# Patient Record
Sex: MALE | Race: WHITE | Employment: OTHER | URBAN - METROPOLITAN AREA
[De-identification: names, ages, dates, MRNs, and addresses within clinical notes are randomized per-mention and may not be internally consistent; named-entity substitution may affect disease eponyms.]

---

## 2018-12-19 PROBLEM — G62.9 AXONAL POLYNEUROPATHY: Status: ACTIVE | Noted: 2018-12-19

## 2018-12-19 PROBLEM — R20.2 PARESTHESIA OF RIGHT FOOT: Status: ACTIVE | Noted: 2018-12-19

## 2018-12-19 PROBLEM — M79.604 RIGHT LEG PAIN: Status: ACTIVE | Noted: 2018-12-19

## 2019-01-03 ENCOUNTER — HOSPITAL ENCOUNTER (OUTPATIENT)
Dept: MRI IMAGING | Age: 74
Discharge: HOME | End: 2019-01-03
Attending: PSYCHIATRY & NEUROLOGY
Payer: MEDICARE

## 2019-01-03 DIAGNOSIS — M79.604 RIGHT LEG PAIN: ICD-10-CM

## 2019-01-03 DIAGNOSIS — R20.2 PARESTHESIA OF RIGHT FOOT: ICD-10-CM

## 2019-01-03 PROCEDURE — 72148 MRI LUMBAR SPINE W/O DYE: CPT

## 2019-06-20 PROBLEM — M54.16 LUMBAR RADICULAR PAIN: Status: ACTIVE | Noted: 2019-06-20

## 2019-06-20 PROBLEM — E53.8 B12 DEFICIENCY: Status: ACTIVE | Noted: 2019-06-20

## 2019-06-20 PROBLEM — M25.551 PAIN OF RIGHT HIP JOINT: Status: ACTIVE | Noted: 2019-06-20

## 2019-06-27 ENCOUNTER — HOSPITAL ENCOUNTER (OUTPATIENT)
Dept: PHYSICAL THERAPY | Age: 74
Discharge: HOME | End: 2019-06-27
Attending: PSYCHIATRY & NEUROLOGY
Payer: MEDICARE

## 2019-06-27 DIAGNOSIS — M54.16 LUMBAR RADICULAR PAIN: ICD-10-CM

## 2019-06-27 DIAGNOSIS — M25.551 PAIN OF RIGHT HIP JOINT: ICD-10-CM

## 2019-06-27 DIAGNOSIS — M79.604 RIGHT LEG PAIN: ICD-10-CM

## 2019-06-27 PROCEDURE — 97162 PT EVAL MOD COMPLEX 30 MIN: CPT

## 2019-06-27 PROCEDURE — 97110 THERAPEUTIC EXERCISES: CPT

## 2019-06-27 NOTE — PROGRESS NOTES
Julius Hasmukh  : 1945  [place]  Secondary: Jamin Linares at [facility]  [address] [facility], [address] [place] [address],[address], [address] [facility]  [phone]   [phone]         OUTPATIENT PHYSICAL THERAPY: Daily Treatment Note 2019  Visit Count:  1    CD-10: Treatment Diagnosis:  RIGHT knee pain M25.561, Right Hip Pain M25.551, low back pain M54.5  Precautions/Allergies:   Patient has no known allergies. TREATMENT PLAN:  Effective Dates: 2019 TO 2019 (60 days). Frequency/Duration: 2 times a week for 60 Day(s)    Pre-treatment Symptoms/Complaints:  Low back pain, right hip and knee pain  Pain: Initial: Pain Intensity 1: (0/10 pain, 7/10 neurological symptoms)  Post Session:  5/10   Medications Last Reviewed:  2019  Updated Objective Findings:  See evaluation note from today  TREATMENT:     THERAPEUTIC EXERCISE: (13 minutes):  Exercises per grid below to improve mobility, strength and balance. Required moderate visual, verbal, manual and tactile cues to promote proper body alignment, promote proper body posture and promote proper body mechanics. Progressed resistance, range, repetitions and complexity of movement as indicated. Date:  19 Date:   Date:     Activity/Exercise Parameters Parameters Parameters   Sciatic nerve glide 20 reps     Standing ext 20 reps     Hip ER stertch 3x30 sec hold                                 [employer] Portal  Treatment/Session Summary:    · Response to Treatment:  Pt would benefit from skilled therapy to address the aforementioned deficits that limit functional ability to return to previous level of function. .  · Communication/Consultation:  eval sent to referring MD  · Equipment provided today:  HEP  · Recommendations/Intent for next treatment session: Next visit will focus on progressing strengthening.     Total Treatment Billable Duration:  13 minutes  PT Patient Time In/Time Out  Time In: 1430  Time Out: GRISELDA Weiner    Future Appointments   Date Time Provider Cheyenne Martell   7/2/2019  2:30 PM Trenton Robledo, PT [facility]   7/9/2019  2:30 PM Marjosue Escalera, PTA [facility]   7/11/2019  8:30 AM [facility]   7/11/2019  2:30 PM Leobardo Escalera, PTA [facility]   7/16/2019  2:30 PM Zach Herring, PT [facility]   7/18/2019 10:15 AM Denisha Avilez MD [facility]   7/23/2019  2:30 PM Leobardo Escalera, PTA [facility]   7/25/2019  2:30 PM Zach Herring, PT [facility]   7/30/2019  2:30 PM Holly Hickman, PTA [facility]   2/18/2020 11:00 AM Roberto Carlos Andersen MD [facility]

## 2019-06-27 NOTE — THERAPY EVALUATION
Julius Noe  : 1945  [place]  Secondary: Jamin Linares at [place] [id number] [facility], [address] [place] [address],[address], [place] [address].  [phone]   [phone]           OUTPATIENT PHYSICAL THERAPY:Initial Assessment 2019   ICD-10: Treatment Diagnosis:  RIGHT knee pain M25.561, Right Hip Pain M25.551, low back pian M54.5  Precautions/Allergies:   Patient has no known allergies. TREATMENT PLAN:  Effective Dates: 2019 TO 2019 (60 days). Frequency/Duration: 2 times a week for 60 Day(s) MEDICAL/REFERRING DIAGNOSIS:  Right leg pain [M79.604]  Lumbar radicular pain [M54.16]  Pain of right hip joint [M25.551]   DATE OF ONSET: one year history  REFERRING PHYSICIAN: Nahomi Garcia MD MD Orders: eval and treat  Return MD Appointment: Neurologist 2020, PCP 2019     INITIAL ASSESSMENT:  Mr. Virl Goltz presents with positive radicular symptoms, clonus on R LE, decreased R LE strength, tenderness in R lumbar paraspinals, glutes and piriformis, R LE nerve tension, poor core strength, decreased joint mobility L2-4 with hypermobile L5-S1 that limits ability to prolonged stand and walk. Pt would benefit from skilled therapy to address these deficits for return to previous level of function. PROBLEM LIST (Impacting functional limitations):  1. Decreased Strength  2. Decreased ADL/Functional Activities  3. Increased Pain  4. Decreased Flexibility/Joint Mobility  5. Decreased [place] with Home Exercise Program INTERVENTIONS PLANNED: (Treatment may consist of any combination of the following)  1. Cold  2. Heat  3. Home Exercise Program (HEP)  4. Manual Therapy  5. Range of Motion (ROM)  6. Therapeutic Exercise/Strengthening     GOALS: (Goals have been discussed and agreed upon with patient.)  Short-Term Functional Goals: Time Frame: 4 weeks  1.  Pt will be I with HEP to allow for continued progress with symptom management. 2. Pt will improve c/o LE numbness to 5/10 for neurological signs indicating centralizing symptoms. Discharge Goals: Time Frame: 8 weeks   1. Pt will improve c/o pain to 3/10 to allow for improved tolerance for return to previous level of function. 2. Pt will improve ROSE score to <15 to reflect an improvement in function. 3. Pt will reports improved tolerance for ambulating to 30 minutes. 4. Pt will improve TA activation to 1 minute hold. OUTCOME MEASURE:     Tool Used: Modified Oswestry Low Back Pain Questionnaire  Score:  Initial: 25/50  Most Recent: X/50 (Date: -- )   Interpretation of Score: Each section is scored on a 0-5 scale, 5 representing the greatest disability. The scores of each section are added together for a total score of 50. MEDICAL NECESSITY:   · Patient is expected to demonstrate progress in strength and range of motion to return to previous level of function. REASON FOR SERVICES/OTHER COMMENTS:  · Patient continues to require present interventions due to patient's inability to prolonged ambulate. Total Duration:  PT Patient Time In/Time Out  Time In: 1430  Time Out: 1515    Rehabilitation Potential For Stated Goals: Good  Regarding Amol Hurtado's therapy, I certify that the treatment plan above will be carried out by a therapist or under their direction. Thank you for this referral,  Ernst Hartley, PT     Referring Physician Signature: Courtney Jaime MD _______________________________ Date _____________     PAIN/SUBJECTIVE:   Initial: Pain Intensity 1: (0/10 pain, 7/10 neurological symptoms)  Post Session:  5/10   HISTORY:   History of Injury/Illness (Reason for Referral):  Pt is a 76 y.o. Male presenting to PT with low back pain and right hip and knee pain for one year history. Has a history of neuropathy in his feet. When he is working in the yard his left leg goes numb after a while.   The numbness and pain goes down the outside of his right leg. The muscles get tight and painful in his leg. Rest for 30 minutes with feet propped up helps to relieve the symptoms. Exhaustion increases symptoms. Has a sensation of heaviness. Feels that when the symptoms increase he is more apt to trip. Sitting with his right leg crossed over his left leg increases his R LE symptoms. Is limited to sitting for 1 hour before he has altered sensation in his R LE. Takes gabapentin once per day in the evening  But has not noticed any improvement with the medication. States that it affects his balance but does not help his right LE symptoms. Some c/o low back pain that coincides with his LE symptoms. LBP occurs primarily with bending forward. Had a cyst removed 15 years ago that resolved all his LBP issues. An MRI was performed. Results below:  L5-S1: There is a circumferential disc bulge with a central posterior disc  protrusion without fely nerve root impingement. No central stenosis noted. Bilateral degenerative facet arthropathy is present. There is mild right  foramina narrowing. The left neural foramen is patent.     Limited evaluation of the paraspinal soft tissues is unremarkable.     IMPRESSION  IMPRESSION:  1. Multilevel degenerative disc and facet disease resulting in moderate  bilateral foramina narrowing at L4-L5 and mild right foramina narrowing at  L5-S1. Mild left foramina narrowing is noted at L3-L4. 2. No central spinal stenosis appreciated. Past Medical History/Comorbidities:   Mr. 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 Mr. Obi Portillo  has no past surgical history on file. Social History/Living Environment:     lives with wife  Prior Level of Function/Work/Activity:  Enjoys walking  none   Ambulatory/Rehab Services H2 Model Falls Risk Assessment   Risk Factors:       No Risk Factors Identified       (1)  Gender [Male] Ability to Rise from Chair:       (0)  Ability to rise in a single movement   Falls Prevention Plan:       No modifications necessary   Total: (5 or greater = High Risk): 1   ©2010 [facility] [employer]. All Rights Reserved. [facility] [employer] Patent #[id number]. Federal Law prohibits the replication, distribution or use without written permission from [facility] [employer]   Current Medications:       Current Outpatient Medications:     multivitamin (ONE A DAY) tablet, Take 1 Tab by mouth daily. , Disp: , Rfl:     cyanocobalamin 1,000 mcg tablet, Take 1,000 mcg by mouth daily. , Disp: , Rfl:     gabapentin (NEURONTIN) 300 mg capsule, 1 capsule (3) three times a day, additional 1 qhs prn  Indications: Neuropathic Pain, Disp: 360 Cap, Rfl: 3    potassium chloride (K-DUR, KLOR-CON) 20 mEq tablet, Take  by mouth two (2) times a day., Disp: , Rfl:     amLODIPine (NORVASC) 5 mg tablet, Take 1 Tab by mouth daily. , Disp: 90 Tab, Rfl: 3    levothyroxine (SYNTHROID) 50 mcg tablet, Take 1 Tab by mouth Daily (before breakfast). , Disp: 90 Tab, Rfl: 3    lisinopril-hydroCHLOROthiazide (ZESTORETIC) 20-12.5 mg per tablet, Take 1 Tab by mouth daily. , Disp: 90 Tab, Rfl: 3    magnesium oxide (MAG-OX) 400 mg tablet, 1 daily for muscle cramping, may have laxative effect, Disp: 90 Tab, Rfl: 2    pravastatin (PRAVACHOL) 40 mg tablet, TAKE ONE TABLET BY MOUTH AT BEDTIME, Disp: 90 Tab, Rfl: 3   Date Last Reviewed:  6/27/2019    Number of Personal Factors/Comorbidities that affect the Plan of Care: 0: LOW COMPLEXITY   EXAMINATION:   Observation/Orthostatic Postural Assessment:          Flattened lumbar spine  Palpation:          Hypertonic and hypersensitive R lumbar paraspinal  Joint mobility deficits at L4-5 with tenderness noted  ROM:             LUMBAR SPINE    AROM    Flexion   Extension  Right Rotation  Left Rotation  Right Sidebend  Left Sidebend 75 %  70 %  75 % pain on R side  75 %  75 % pain on L side  75 % pain on R side     Hip ER:  L-  45, R- 35 degrees  Hip IR:  L- 25, R - 25 degrees    Strength:    TA-  4/5 with poor activation          LOWER EXTREMITY      Left  Right    Knee flexion  Knee extension  Hip Ext  Hip Flexion  Hip ER  Hip IR   Hip ABD-  5-/5  5-/5  5-/5  5-/5  5-/5  5-/5  5-/5 Knee flexion   Knee extension  Hip Ext  Hip Flexion  Hip ER  Hip IR  Hip ABD   4/5  4/5  4/5  4/5  3+/5  4-/5  4-/5        Special Tests:          Slump-  Positive for R LE pain when sensitized  Traction- improved c/o numbness  Clonus- positive on R LE with 5 pumps  Functional Mobility:         Gait/Ambulation:  WNL  SLS-  R LE 2 seconds with c/o knee buckling, L LE 5 seconds with improved ankle strategy   Body Structures Involved:  6. Nerves  7. Muscles Body Functions Affected:  7. Sensory/Pain  8. Neuromusculoskeletal  9. Movement Related Activities and Participation Affected:  3.  General Tasks and Demands   Number of elements (examined above) that affect the Plan of Care: 3: MODERATE COMPLEXITY   CLINICAL PRESENTATION:   Presentation: Evolving clinical presentation with changing clinical characteristics: MODERATE COMPLEXITY   CLINICAL DECISION MAKING:   Use of outcome tool(s) and clinical judgement create a POC that gives a: Questionable prediction of patient's progress: MODERATE COMPLEXITY

## 2019-07-02 ENCOUNTER — HOSPITAL ENCOUNTER (OUTPATIENT)
Dept: PHYSICAL THERAPY | Age: 74
Discharge: HOME | End: 2019-07-02
Attending: PSYCHIATRY & NEUROLOGY
Payer: MEDICARE

## 2019-07-02 PROCEDURE — 97140 MANUAL THERAPY 1/> REGIONS: CPT

## 2019-07-02 PROCEDURE — 97110 THERAPEUTIC EXERCISES: CPT

## 2019-07-02 NOTE — PROGRESS NOTES
Oumou Hamm  : 1945  [place]  Secondary: Jamin Linares at [facility], [address] [place] [address],[address], [address] [facility]  [phone]   [phone]         OUTPATIENT PHYSICAL THERAPY: Daily Treatment Note 2019  Visit Count:  2    CD-10: Treatment Diagnosis:  RIGHT knee pain M25.561, Right Hip Pain M25.551, low back pain M54.5  Precautions/Allergies:   Patient has no known allergies. TREATMENT PLAN:  Effective Dates: 2019 TO 2019 (60 days). Frequency/Duration: 2 times a week for 60 Day(s)    Pre-treatment Symptoms/Complaints:  Low back pain, right hip and knee pain- 2019 Pt reports that he has trouble with one of his exercises but the rest are fine. Pain: Initial: Pain Intensity 1: 7  Post Session:  5/10   Medications Last Reviewed:  2019  Updated Objective Findings:  None Today  TREATMENT:     THERAPEUTIC EXERCISE: (30 minutes):  Exercises per grid below to improve mobility, strength and balance. Required moderate visual, verbal, manual and tactile cues to promote proper body alignment, promote proper body posture and promote proper body mechanics. Progressed resistance, range, repetitions and complexity of movement as indicated. Date:  19 Date:  19 Date:     Activity/Exercise Parameters Parameters Parameters   Sciatic nerve glide 20 reps 20 reps each    Standing ext 20 reps     Hip ER stertch 3x30 sec hold 3x30 sec hold    Prone press up  15 reps    bridge  20 reps    clamshell  Blue x 20 reps each    Pull down with TA  Blue x 20 reps    melba pose  3x30 sec hold    nustep  6 min level 4.0                        MANUAL THERAPY (13 minutes)  Lumbar paraspinal and QL release, CPA and UPA L2-4 grade III-IV To promote tissue length and joint mobility for return to previous level of function.    [employer] Portal  Treatment/Session Summary:    · Response to Treatment:  Pt with good early response to therapy and carryover with HEP. Updated hip ER stretch to decrease c/o pain. Significant soft tissue restriction and tenderness in R lumbar paraspinals so added release to HEP. · Communication/Consultation:  None today  · Equipment provided today:  HEP  · Recommendations/Intent for next treatment session: Next visit will focus on progressing strengthening.     Total Treatment Billable Duration:  43 minutes  PT Patient Time In/Time Out  Time In: 5701  Time Out: 5[facility], PT    Future Appointments   Date Time Provider Cheyenne Martell   7/9/2019  2:30 PM Jordon Arreola PTA [facility] SFE   7/11/2019  8:30 AM 10 [facility] 10 [facility] 10 [facility]   7/11/2019  2:30 PM Jordon Arreola PTA [facility]   7/16/2019  2:30 PM Ashish Herring, PT [facility]   7/18/2019 10:15 AM Deborah Maya MD [facility] 10 [address]   7/23/2019  2:30 PM Jordon Arreola PTA [facility]   7/25/2019  2:30 PM Ashish Herring, PT [facility]   7/30/2019  2:30 PM Terence Hickman, PTA [facility]   2/18/2020 11:00 AM Evan Lozada MD [facility]

## 2019-07-09 ENCOUNTER — APPOINTMENT (OUTPATIENT)
Dept: PHYSICAL THERAPY | Age: 74
End: 2019-07-09
Attending: PSYCHIATRY & NEUROLOGY
Payer: MEDICARE

## 2019-07-10 ENCOUNTER — HOSPITAL ENCOUNTER (OUTPATIENT)
Dept: PHYSICAL THERAPY | Age: 74
Discharge: HOME | End: 2019-07-10
Attending: PSYCHIATRY & NEUROLOGY
Payer: MEDICARE

## 2019-07-10 PROCEDURE — 97140 MANUAL THERAPY 1/> REGIONS: CPT

## 2019-07-10 PROCEDURE — 97110 THERAPEUTIC EXERCISES: CPT

## 2019-07-10 NOTE — PROGRESS NOTES
Lazarus Ovens  : 1945  [place]  Secondary: Jamin Linares at [facility] 52, [address] [facility],[address], [facility] [address].  [phone]   [phone]         OUTPATIENT PHYSICAL THERAPY: Daily Treatment Note 7/10/2019  Visit Count:  3    CD-10: Treatment Diagnosis:  RIGHT knee pain M25.561, Right Hip Pain M25.551, low back pain M54.5  Precautions/Allergies:   Patient has no known allergies. TREATMENT PLAN:  Effective Dates: 2019 TO 2019 (60 days). Frequency/Duration: 2 times a week for 60 Day(s)    Pre-treatment Symptoms/Complaints:  Low back pain, right hip and knee pain- 7/10/2019  It is about the same. Pain: Initial:    Post Session:  5/10   Medications Last Reviewed:  7/10/2019  Updated Objective Findings:  None Today  TREATMENT:     THERAPEUTIC EXERCISE: (30 minutes):  Exercises per grid below to improve mobility, strength and balance. Required moderate visual, verbal, manual and tactile cues to promote proper body alignment, promote proper body posture and promote proper body mechanics. Progressed resistance, range, repetitions and complexity of movement as indicated. Date:  07/10/19 Date:     Activity/Exercise  Parameters Parameters   Sciatic nerve glide  20 reps each          Hip ER stertch  3x30 sec hold    Prone press up  15 reps    bridge  20 reps    clamshell  Blue x 20 reps each    Pull down with TA  Blue x 20 reps    melba pose  3x30 sec hold    nustep  6 min level 4.0    Standing Extension  High March abduction  X 15 reps each                  MANUAL THERAPY (13 minutes)  Lumbar paraspinal and QL release, CPA and UPA L2-4 grade III-IV To promote tissue length and joint mobility for return to previous level of function. [employer] Portal  Treatment/Session Summary:    · Response to Treatment:   Patient was able to complete tx. Point tenderness in Right Gluteal region.   Patient continue's to have symptoms down leg and into foot. · Communication/Consultation:  None today  · Equipment provided today:  HEP  · Recommendations/Intent for next treatment session: Next visit will focus on progressing strengthening.     Total Treatment Billable Duration:  43 minutes  PT Patient Time In/Time Out  Time In: 1430  Time Out: Bakari Nunez PTA    Future Appointments   Date Time Provider Cheyenne Jayshree   7/11/2019  8:30 AM 10 [facility] 10 [address]   7/11/2019  2:30 PM Janina Lyman PTA [facility]   7/16/2019  2:30 PM Suzie Herring, PT [facility]   7/18/2019 10:15 AM Rossy Watson MD [facility] 10 [address]   7/23/2019  2:30 PM Janina Lyman, PTA [facility]FE   7/25/2019  2:30 PM Suzie Herring, PT [facility]   7/30/2019  2:30 PM Celso Hickman, PTA [facility]   2/18/2020 11:00 AM Robin Rosa MD [facility]

## 2019-07-11 ENCOUNTER — HOSPITAL ENCOUNTER (OUTPATIENT)
Dept: PHYSICAL THERAPY | Age: 74
Discharge: HOME | End: 2019-07-11
Attending: PSYCHIATRY & NEUROLOGY
Payer: MEDICARE

## 2019-07-11 PROCEDURE — 97140 MANUAL THERAPY 1/> REGIONS: CPT

## 2019-07-11 PROCEDURE — 97110 THERAPEUTIC EXERCISES: CPT

## 2019-07-11 NOTE — PROGRESS NOTES
Leila Hoffmann  : 1945  [place]  Secondary: Jamin Linares at [place]  [address] [facility] [address], [address], [place] [address].  [phone]   [phone]         OUTPATIENT PHYSICAL THERAPY: Daily Treatment Note 2019  Visit Count:  4    CD-10: Treatment Diagnosis:  RIGHT knee pain M25.561, Right Hip Pain M25.551, low back pain M54.5  Precautions/Allergies:   Patient has no known allergies. TREATMENT PLAN:  Effective Dates: 2019 TO 2019 (60 days). Frequency/Duration: 2 times a week for 60 Day(s)    Pre-treatment Symptoms/Complaints:  Low back pain, right hip and knee pain- 2019 I mowed two lawns today so I am sorta shot. Pain: Initial:   5 Post Session:  5/10   Medications Last Reviewed:  2019  Updated Objective Findings:  None Today  TREATMENT:     THERAPEUTIC EXERCISE: (30 minutes):  Exercises per grid below to improve mobility, strength and balance. Required moderate visual, verbal, manual and tactile cues to promote proper body alignment, promote proper body posture and promote proper body mechanics. Progressed resistance, range, repetitions and complexity of movement as indicated. Date:  19 Date:     Activity/Exercise  Parameters Parameters   Sciatic nerve glide  20 reps each          Hip ER stertch  3x30 sec hold    Prone press up  15 reps    bridge  20 reps    clamshell  Blue x 20 reps each    Pull down with TA  Blue x 20 reps    melba pose  3x30 sec hold    nustep  6 min level 4.0    Standing Extension  High March abduction  X 15 reps each  #2                  MANUAL THERAPY (13 minutes)  Lumbar paraspinal and QL release, CPA and UPA L2-4 grade III-IV To promote tissue length and joint mobility for return to previous level of function. [employer] Portal  Treatment/Session Summary:    · Response to Treatment:   Patient was able to complete tx.   Point tenderness in Right Gluteal region. Patient continue's to have symptoms down leg and into foot. Try inversion table next visit. · Communication/Consultation:  None today  · Equipment provided today:  HEP  · Recommendations/Intent for next treatment session: Next visit will focus on progressing strengthening.     Total Treatment Billable Duration:  43 minutes  PT Patient Time In/Time Out  Time In: 1430  Time Out: Bakari Nunez [place]    Future Appointments   Date Time Provider Cheyenne Martell   7/16/2019  2:30 PM Francoise Florence PT [facility] SFE   7/18/2019 10:15 AM Anusha Carranza MD [facility]   7/23/2019  2:30 PM Renetta Nichols PTA [facility]   7/25/2019  2:30 PM Glo Herring, PT [facility]   7/30/2019  2:30 PM Maggi Hickman, PTA [facility] SFE   2/18/2020 11:00 AM Corin Perez MD [facility]

## 2019-07-16 ENCOUNTER — HOSPITAL ENCOUNTER (OUTPATIENT)
Dept: PHYSICAL THERAPY | Age: 74
Discharge: HOME | End: 2019-07-16
Attending: PSYCHIATRY & NEUROLOGY
Payer: MEDICARE

## 2019-07-16 PROCEDURE — 97140 MANUAL THERAPY 1/> REGIONS: CPT

## 2019-07-16 PROCEDURE — 97110 THERAPEUTIC EXERCISES: CPT

## 2019-07-16 NOTE — PROGRESS NOTES
Alfonso Mayen  : 1945  [place]  Secondary: Jamin Linares at [place] [address] [facility], [address] [place] [address],[address], [facility] [address].  [phone]   [phone]         OUTPATIENT PHYSICAL THERAPY: Daily Treatment Note 2019  Visit Count:  5    CD-10: Treatment Diagnosis:  RIGHT knee pain M25.561, Right Hip Pain M25.551, low back pain M54.5  Precautions/Allergies:   Patient has no known allergies. TREATMENT PLAN:  Effective Dates: 2019 TO 2019 (60 days). 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 Pain: Initial:   5 Post Session:  5/10   Medications Last Reviewed:  2019  Updated Objective Findings:  None Today  TREATMENT:     THERAPEUTIC EXERCISE: (30 minutes):  Exercises per grid below to improve mobility, strength and balance. Required moderate visual, verbal, manual and tactile cues to promote proper body alignment, promote proper body posture and promote proper body mechanics. Progressed resistance, range, repetitions and complexity of movement as indicated. Date:  19 Date:     Activity/Exercise Parameters Parameters   Sciatic nerve glide 20 reps each         Hip ER stertch 3x30 sec hold 3x30 sec hold   Prone press up 15 reps    bridge 20 reps Figure 4 x 20 reps each   clamshell Blue x 20 reps each    Pull down with TA Blue x 20 reps    melba pose 3x30 sec hold 3x30 sec hold   nustep 6 min level 4.0 6 min level 4.0   Standing Extension  High March abduction X 15 reps each  #2    Inversion Table  3x2 min bouts at 45 degrees          MANUAL THERAPY (13 minutes)  Lumbar paraspinal and QL release, CPA and UPA L2-4 grade III-IV To promote tissue length and joint mobility for return to previous level of function.    [employer] Portal  Treatment/Session Summary:    · Response to Treatment:   Assess response to inversion table next visit. · Communication/Consultation:  None today  · Equipment provided today:  HEP  · Recommendations/Intent for next treatment session: Next visit will focus on progressing strengthening.     Total Treatment Billable Duration:  43 minutes     Ernst Hartley PT    Future Appointments   Date Time Provider Cheyenne Aguiari   7/16/2019  2:30 PM Jazlyn Reveles PT [facility] SFE   7/18/2019 10:15 AM Kris Erazo MD [facility]   7/23/2019  2:30 PM Adelaide Herring, PT [facility]   7/25/2019  2:30 PM Adelaide Herring, PT [facility]FE   7/30/2019  2:30 PM Aleena Hickman, PTA [facility] SFE   2/18/2020 11:00 AM Courtney Jaime MD [facility]

## 2019-07-23 ENCOUNTER — HOSPITAL ENCOUNTER (OUTPATIENT)
Dept: PHYSICAL THERAPY | Age: 74
Discharge: HOME | End: 2019-07-23
Attending: PSYCHIATRY & NEUROLOGY
Payer: MEDICARE

## 2019-07-23 PROCEDURE — 97140 MANUAL THERAPY 1/> REGIONS: CPT

## 2019-07-23 PROCEDURE — 97110 THERAPEUTIC EXERCISES: CPT

## 2019-07-23 NOTE — PROGRESS NOTES
Bladimir Spence  : 1945  [place]  Secondary: Jamin Barakat 75 at [address], [address] [place] [address],[address], [place] [address].  [phone]   [phone]         OUTPATIENT PHYSICAL THERAPY: Daily Treatment Note 2019  Visit Count:  6    CD-10: Treatment Diagnosis:  RIGHT knee pain M25.561, Right Hip Pain M25.551, low back pain M54.5  Precautions/Allergies:   Patient has no known allergies. TREATMENT PLAN:  Effective Dates: 2019 TO 2019 (60 days). Frequency/Duration: 2 times a week for 60 Day(s)    Pre-treatment Symptoms/Complaints:  Low back pain, right hip and knee pain- 2019 Pt reports that the symptoms in his low back and hip are better but he still has numbness  Pain: Initial:   5/10 Post Session:  5/10   Medications Last Reviewed:  2019  Updated Objective Findings:  None Today  TREATMENT:     THERAPEUTIC EXERCISE: (30 minutes):  Exercises per grid below to improve mobility, strength and balance. Required moderate visual, verbal, manual and tactile cues to promote proper body alignment, promote proper body posture and promote proper body mechanics. Progressed resistance, range, repetitions and complexity of movement as indicated.    Date:  19 Date:   Date:  19   Activity/Exercise Parameters Parameters    Sciatic nerve glide 20 reps each  20 reps each   Supine rotation   20 reps   Hip ER stertch 3x30 sec hold 3x30 sec hold    Prone press up 15 reps  20 reps   bridge 20 reps Figure 4 x 20 reps each 20 reps   clamshell Blue x 20 reps each     Pull down with TA Blue x 20 reps  Blue x 20 reps   melba pose 3x30 sec hold 3x30 sec hold 3x30 sec hold   nustep 6 min level 4.0 6 min level 4.0    Standing Extension  High March abduction X 15 reps each  #2     Inversion Table  3x2 min bouts at 45 degrees    airdyne   6 min                                         MANUAL THERAPY (13 minutes)  Lumbar paraspinal and QL release, CPA and UPA L2-4 grade III-IV To promote tissue length and joint mobility for return to previous level of function. [employer] Portal  Treatment/Session Summary:    · Response to Treatment:   Pt with improving TA activation but continued nerve tension into R LE.  · Communication/Consultation:  None today  · Equipment provided today:  HEP  · Recommendations/Intent for next treatment session: Next visit will focus on progressing strengthening.     Total Treatment Billable Duration:  43 minutes  PT Patient Time In/Time Out  Time In: 1430  Time Out: GRISELDA Weiner    Future Appointments   Date Time Provider Cheyenne Martell   7/25/2019  2:30 PM [facility]   7/30/2019  2:30 PM Man[facility] Herman [facility]TA [facility]   1/20/2020  8:30 AM [facility] LAB [facility]   1/27/2020 10:15 AM Rivka Magana MD [facility]   2/18/2020 11:00 AM Savi Interiano MD [facility]

## 2019-07-25 ENCOUNTER — HOSPITAL ENCOUNTER (OUTPATIENT)
Dept: PHYSICAL THERAPY | Age: 74
Discharge: HOME | End: 2019-07-25
Attending: PSYCHIATRY & NEUROLOGY
Payer: MEDICARE

## 2019-07-25 PROCEDURE — 97110 THERAPEUTIC EXERCISES: CPT

## 2019-07-25 PROCEDURE — 97140 MANUAL THERAPY 1/> REGIONS: CPT

## 2019-07-25 NOTE — PROGRESS NOTES
Khurram Burnham  : 1945  [place]  Secondary: Jamin Barakat 75 at [place] [id number] [facility], [address] [place] [address],[address], [place] [address].  [phone]   [phone]         OUTPATIENT PHYSICAL THERAPY: Daily Treatment Note 2019  Visit Count:  7    CD-10: Treatment Diagnosis:  RIGHT knee pain M25.561, Right Hip Pain M25.551, low back pain M54.5  Precautions/Allergies:   Patient has no known allergies. TREATMENT PLAN:  Effective Dates: 2019 TO 2019 (60 days). Frequency/Duration: 2 times a week for 60 Day(s)    Pre-treatment Symptoms/Complaints:  Low back pain, right hip and knee pain- 2019 Pt reports he worked in the yard all morning and is paying for it  Pain: Initial:   5/10 Post Session:  5/10   Medications Last Reviewed:  2019  Updated Objective Findings:  None Today  TREATMENT:     THERAPEUTIC EXERCISE: (30 minutes):  Exercises per grid below to improve mobility, strength and balance. Required moderate visual, verbal, manual and tactile cues to promote proper body alignment, promote proper body posture and promote proper body mechanics. Progressed resistance, range, repetitions and complexity of movement as indicated.    Date:  19 Date:   Date:  19 Date:  19   Activity/Exercise Parameters Parameters     Sciatic nerve glide 20 reps each  20 reps each    Supine rotation   20 reps 20 reps   Hip ER stertch 3x30 sec hold 3x30 sec hold     Prone press up 15 reps  20 reps 20 reps   bridge 20 reps Figure 4 x 20 reps each 20 reps 20 reps   clamshell Blue x 20 reps each      Pull down with TA Blue x 20 reps  Blue x 20 reps Blue x 20 reps   melba pose 3x30 sec hold 3x30 sec hold 3x30 sec hold    nustep 6 min level 4.0 6 min level 4.0     Standing Extension  High March abduction X 15 reps each  #2      Inversion Table  3x2 min bouts at 45 degrees     airdyne   6 min    Isometric obliques Blue x 20 reps each   Quadruped rocking    10x10 sec hold each                                 MANUAL THERAPY (13 minutes)  Lower extremity traction, Lumbar paraspinal and QL release, CPA and UPA L2-4 grade III-IV To promote tissue length and joint mobility for return to previous level of function. [facility] Portal  Treatment/Session Summary:    · Response to Treatment:   Pt with improving TA activation but continued nerve tension into R LE.  · Communication/Consultation:  None today  · Equipment provided today:  HEP  · Recommendations/Intent for next treatment session: Next visit will focus on progressing strengthening.     Total Treatment Billable Duration:  43 minutes  PT Patient Time In/Time Out  Time In: 1430  Time Out: GRISELDA Weiner    Future Appointments   Date Time Provider Cheyenne Martell   7/30/2019  2:30 PM Leti [facility]   1/20/2020  8:30 AM [facility] LAB [facility]   1/27/2020 10:15 AM Ariadna Boyd MD [facility]   2/18/2020 11:00 AM Tamia Rdz MD [facility]

## 2019-07-30 ENCOUNTER — HOSPITAL ENCOUNTER (OUTPATIENT)
Dept: PHYSICAL THERAPY | Age: 74
Discharge: HOME | End: 2019-07-30
Attending: PSYCHIATRY & NEUROLOGY
Payer: MEDICARE

## 2019-07-30 PROCEDURE — 97110 THERAPEUTIC EXERCISES: CPT

## 2019-07-30 PROCEDURE — 97140 MANUAL THERAPY 1/> REGIONS: CPT

## 2019-07-30 NOTE — PROGRESS NOTES
Ovidio Carrollton  : 1945  [place]  Secondary: Jamin Barakat 75 at [place] [id number] [facility], [address] [place] [address],[address], [facility].  [phone]   [phone]         OUTPATIENT PHYSICAL THERAPY: Daily Treatment Note 2019  Visit Count:  8    CD-10: Treatment Diagnosis:  RIGHT knee pain M25.561, Right Hip Pain M25.551, low back pain M54.5  Precautions/Allergies:   Patient has no known allergies. TREATMENT PLAN:  Effective Dates: 2019 TO 2019 (60 days). Frequency/Duration: 2 times a week for 60 Day(s)    Pre-treatment Symptoms/Complaints:  Low back pain, right hip and knee pain- 2019 'I am doing ok\"  Pain: Initial:   0/10 weakness and numbness irriatation Post Session:  5/10   Medications Last Reviewed:  2019  Updated Objective Findings:  None Today  TREATMENT:     THERAPEUTIC EXERCISE: (30 minutes):  Exercises per grid below to improve mobility, strength and balance. Required moderate visual, verbal, manual and tactile cues to promote proper body alignment, promote proper body posture and promote proper body mechanics. Progressed resistance, range, repetitions and complexity of movement as indicated.    Date:  19 Date:   Date:  19 Date:  19   Activity/Exercise Parameters Parameters     Sciatic nerve glide 20 reps each  20 reps each X 20 reps    Supine rotation   20 reps 20 reps   Hip ER stertch 3x30 sec hold 3x30 sec hold  x   Prone press up 15 reps  20 reps 20 reps   bridge 20 reps Figure 4 x 20 reps each 20 reps 20 reps   SLR R  Hip abduction  R Blue x 20 reps each   #2 x 20 reps    Pull down with TA Blue x 20 reps  Blue x 20 reps Blue x 20 reps   melba pose 3x30 sec hold 3x30 sec hold 3x30 sec hold x   nustep 6 min level 4.0 6 min level 4.0  x   Standing Extension  High March abduction X 15 reps each  #2   #2 X 20 REPS each way   Inversion Table  3x2 min bouts at 45 degrees x x   airdyne   6 min x   Isometric obliques    Blue x 20 reps each   Quadruped rocking    10x10 sec hold each   Piriformis stretch    3 x  20 sec bilaterally                          MANUAL THERAPY (13 minutes)  Lower extremity traction, Lumbar paraspinal and QL release, CPA and UPA L2-4 grade III-IV To promote tissue length and joint mobility for return to previous level of function. [facility]  Treatment/Session Summary:    · Response to Treatment:   Pt with improving TA activation but nerve is throughout the day, only laying down take's the nerve issue away. Sitting makes it go to sleep more-and more weakness. · Communication/Consultation:  None today  · Equipment provided today:  HEP  · Recommendations/Intent for next treatment session: Next visit will focus on progressing strengthening.     Total Treatment Billable Duration:  43 minutes  PT Patient Time In/Time Out  Time In: 1430  Time Out: Bakari Nunez PTA    Future Appointments   Date Time Provider Cheyenne Martell   1/20/2020  8:30 AM [facility] LAB [facility]   1/27/2020 10:15 AM Rivka Magana MD [facility]   2/18/2020 11:00 AM Savi Interiano MD [facility]

## 2019-08-01 ENCOUNTER — HOSPITAL ENCOUNTER (OUTPATIENT)
Dept: PHYSICAL THERAPY | Age: 74
Discharge: HOME | End: 2019-08-01
Attending: PSYCHIATRY & NEUROLOGY
Payer: MEDICARE

## 2019-08-01 PROCEDURE — 97110 THERAPEUTIC EXERCISES: CPT

## 2019-08-01 PROCEDURE — 97140 MANUAL THERAPY 1/> REGIONS: CPT

## 2019-08-01 NOTE — PROGRESS NOTES
Aishwarya Mancera  : 1945  [place]  Secondary: Jamin Barakat 75 at [place] [address] [facility], [address] [place] [address],[address], [place] [address].  [phone]   [phone]         OUTPATIENT PHYSICAL THERAPY: Daily Treatment Note 2019  Visit Count:  9    CD-10: Treatment Diagnosis:  RIGHT knee pain M25.561, Right Hip Pain M25.551, low back pain M54.5  Precautions/Allergies:   Patient has no known allergies. TREATMENT PLAN:  Effective Dates: 2019 TO 2019 (60 days). Frequency/Duration: 2 times a week for 60 Day(s)    Pre-treatment Symptoms/Complaints:  Low back pain, right hip and knee pain- 2019 'I am doing ok\" \"The irritation of the numbness and weakness of Right side LE. Pain: Initial:   0/10 weakness and numbness irriatation Post Session:  5/10   Medications Last Reviewed:  2019  Updated Objective Findings:  None Today  TREATMENT:     THERAPEUTIC EXERCISE: (30 minutes):  Exercises per grid below to improve mobility, strength and balance. Required moderate visual, verbal, manual and tactile cues to promote proper body alignment, promote proper body posture and promote proper body mechanics. Progressed resistance, range, repetitions and complexity of movement as indicated.    Date:  19 Date:   Date:  19 Date:  19   Activity/Exercise Parameters Parameters     Sciatic nerve glide 20 reps each  20 reps each X 20 reps    Supine rotation   20 reps 20 reps   Hip ER stertch 3x30 sec hold 3x30 sec hold  x   Prone press up 15 reps  20 reps 20 reps   bridge 20 reps Figure 4 x 20 reps each 20 reps 20 reps   SLR R  Hip abduction  R Blue x 20 reps each   #2 x 20 reps    Pull down with TA Blue x 20 reps  Blue x 20 reps Blue x 20 reps   melba pose 3x30 sec hold 3x30 sec hold 3x30 sec hold 3 x 30 sec hold   nustep 6 min level 4.0 6 min level 4.0  x   Standing Extension  High March abduction X 15 reps each  #2   #2 X 20 REPS each way   Inversion Table  3x2 min bouts at 45 degrees x x   airdyne   6 min x   Isometric obliques    Blue x 20 reps each   Quadruped rocking    10x10 sec hold each   Piriformis stretch    3 x  20 sec bilaterally   Isometric hip flexion     5 x 10 sec holds bilaterally\                   MANUAL THERAPY (13 minutes)  Lower extremity traction, Lumbar paraspinal and QL release, CPA and UPA L2-4 grade III-IV To promote tissue length and joint mobility for return to previous level of function. Piriformis deep tissue work secondary to tightness. [facility] Portal  Treatment/Session Summary:    · Response to Treatment:  Patient continue's to have nerve issues down right Lower Extremity at this time. Low back feeling much better-less tightness overall. · Communication/Consultation:  None today  · Equipment provided today:  HEP  · Recommendations/Intent for next treatment session: Next visit will focus on progressing strengthening.     Total Treatment Billable Duration:  43 minutes  PT Patient Time In/Time Out  Time In: 1300  Time Out: [address] [facility]    Future Appointments   Date Time Provider Cheyenne Martell   8/6/2019  1:45 PM Caitlyn Herring, PT [facility]   8/8/2019  1:00 PM Caitlyn Herring, PT [facility]   8/14/2019  1:00 PM Caitlyn Herring, PT [facility]   8/16/2019  1:00 PM Caitlyn Herring, PT [facility]   8/20/2019  1:00 PM Esha Kwan, PTA [facility]   8/22/2019  1:00 PM Esha Kwan, PTA [facility]   8/27/2019  1:00 PM Esha wKan, PTA [facility]   8/29/2019  1:00 PM Caitlyn Herring, PT [facility]   1/20/2020  8:30 AM [facility] LAB [facility]   1/27/2020 10:15 AM Ирина Vanegas MD [facility]   2/18/2020 11:00 AM Corey Hadley MD [facility]

## 2019-08-06 ENCOUNTER — HOSPITAL ENCOUNTER (OUTPATIENT)
Dept: PHYSICAL THERAPY | Age: 74
Discharge: HOME | End: 2019-08-06
Attending: PSYCHIATRY & NEUROLOGY
Payer: MEDICARE

## 2019-08-06 PROCEDURE — 97110 THERAPEUTIC EXERCISES: CPT

## 2019-08-08 ENCOUNTER — APPOINTMENT (OUTPATIENT)
Dept: PHYSICAL THERAPY | Age: 74
End: 2019-08-08
Attending: PSYCHIATRY & NEUROLOGY
Payer: MEDICARE

## 2019-08-14 ENCOUNTER — APPOINTMENT (OUTPATIENT)
Dept: PHYSICAL THERAPY | Age: 74
End: 2019-08-14
Attending: PSYCHIATRY & NEUROLOGY
Payer: MEDICARE

## 2019-08-16 ENCOUNTER — APPOINTMENT (OUTPATIENT)
Dept: PHYSICAL THERAPY | Age: 74
End: 2019-08-16
Attending: PSYCHIATRY & NEUROLOGY
Payer: MEDICARE

## 2019-08-20 ENCOUNTER — APPOINTMENT (OUTPATIENT)
Dept: PHYSICAL THERAPY | Age: 74
End: 2019-08-20
Attending: PSYCHIATRY & NEUROLOGY
Payer: MEDICARE

## 2019-08-22 ENCOUNTER — APPOINTMENT (OUTPATIENT)
Dept: PHYSICAL THERAPY | Age: 74
End: 2019-08-22
Attending: PSYCHIATRY & NEUROLOGY
Payer: MEDICARE

## 2019-08-27 ENCOUNTER — APPOINTMENT (OUTPATIENT)
Dept: PHYSICAL THERAPY | Age: 74
End: 2019-08-27
Attending: PSYCHIATRY & NEUROLOGY
Payer: MEDICARE

## 2019-08-29 ENCOUNTER — APPOINTMENT (OUTPATIENT)
Dept: PHYSICAL THERAPY | Age: 74
End: 2019-08-29
Attending: PSYCHIATRY & NEUROLOGY
Payer: MEDICARE

## 2020-06-11 NOTE — PROGRESS NOTES
Lucila Pruitt  : 1945  [place]  Secondary: Jamin Linares at [place] [address] [facility], [address] [place] [address],[address], [address]  [phone]   [phone]           OUTPATIENT PHYSICAL THERAPY:Discontinuation Summary 2019   ICD-10: Treatment Diagnosis:  RIGHT knee pain M25.561, Right Hip Pain M25.551, low back pian M54.5  Precautions/Allergies:   Patient has no known allergies. TREATMENT PLAN:  Effective Dates: 2019 TO 2019 (60 days). Frequency/Duration: 2 times a week for 60 Day(s) MEDICAL/REFERRING DIAGNOSIS:  Pain in right leg [M79.604]   DATE OF ONSET: one year history  REFERRING PHYSICIAN: Patricia Acosta MD MD Orders: eval and treat  Return MD Appointment: Neurologist 2020, PCP 2019     INITIAL ASSESSMENT:  Mr. Siddharth Mina attended Visit Count:  10 since 20 but did not return for f/u appointments. PROBLEM LIST (Impacting functional limitations):  1. Decreased Strength  2. Decreased ADL/Functional Activities  3. Increased Pain  4. Decreased Flexibility/Joint Mobility  5. Decreased [place] with Home Exercise Program INTERVENTIONS PLANNED: (Treatment may consist of any combination of the following)  1. Cold  2. Heat  3. Home Exercise Program (HEP)  4. Manual Therapy  5. Range of Motion (ROM)  6. Therapeutic Exercise/Strengthening   Pt discontinued care therefore unable to assess progress. GOALS: (Goals have been discussed and agreed upon with patient.)  Short-Term Functional Goals: Time Frame: 4 weeks  1. Pt will be I with HEP to allow for continued progress with symptom management. 2. Pt will improve c/o LE numbness to 5/10 for neurological signs indicating centralizing symptoms. Discharge Goals: Time Frame: 8 weeks   1. Pt will improve c/o pain to 3/10 to allow for improved tolerance for return to previous level of function.   2. Pt will improve ROSE score to <15 to reflect an improvement in function. 3. Pt will reports improved tolerance for ambulating to 30 minutes. 4. Pt will improve TA activation to 1 minute hold. OUTCOME MEASURE:   Pt discontinued care therefore unable to assess progress. Tool Used: Modified Oswestry Low Back Pain Questionnaire  Score:  Initial: 25/50  Most Recent: X/50 (Date: -- )   Interpretation of Score: Each section is scored on a 0-5 scale, 5 representing the greatest disability. The scores of each section are added together for a total score of 50. MEDICAL NECESSITY:   · Patient is expected to demonstrate progress in strength and range of motion to return to previous level of function. REASON FOR SERVICES/OTHER COMMENTS:  · Patient continues to require present interventions due to patient's inability to prolonged ambulate. Total Duration:  PT Patient Time In/Time Out  Time In: 1345  Time Out: 1425    Rehabilitation Potential For Stated Goals: Good  Regarding Sanjay Hurtado's therapy, I certify that the treatment plan above will be carried out by a therapist or under their direction. Thank you for this referral,  Jones Wang PT          HISTORY:   History of Injury/Illness (Reason for Referral):  Pt is a 76 y.o. Male presenting to PT with low back pain and right hip and knee pain for one year history. Has a history of neuropathy in his feet. When he is working in the yard his left leg goes numb after a while. The numbness and pain goes down the outside of his right leg. The muscles get tight and painful in his leg. Rest for 30 minutes with feet propped up helps to relieve the symptoms. Exhaustion increases symptoms. Has a sensation of heaviness. Feels that when the symptoms increase he is more apt to trip. Sitting with his right leg crossed over his left leg increases his R LE symptoms. Is limited to sitting for 1 hour before he has altered sensation in his R LE.   Takes gabapentin once per day in the evening But has not noticed any improvement with the medication. States that it affects his balance but does not help his right LE symptoms. Some c/o low back pain that coincides with his LE symptoms. LBP occurs primarily with bending forward. Had a cyst removed 15 years ago that resolved all his LBP issues. An MRI was performed. Results below:  L5-S1: There is a circumferential disc bulge with a central posterior disc  protrusion without fely nerve root impingement. No central stenosis noted. Bilateral degenerative facet arthropathy is present. There is mild right  foramina narrowing. The left neural foramen is patent.     Limited evaluation of the paraspinal soft tissues is unremarkable.     IMPRESSION  IMPRESSION:  1. Multilevel degenerative disc and facet disease resulting in moderate  bilateral foramina narrowing at L4-L5 and mild right foramina narrowing at  L5-S1. Mild left foramina narrowing is noted at L3-L4. 2. No central spinal stenosis appreciated. Past Medical History/Comorbidities:   Mr. 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 Mr. Zane Bland  has no past surgical history on file.   Social History/Living Environment:     lives with wife  Prior Level of Function/Work/Activity:  Enjoys walking  none   Ambulatory/Rehab Services H2 Model Falls Risk Assessment   Risk Factors:       No Risk Factors Identified       (1)  Gender [Male] Ability to Rise from Chair:       (0)  Ability to rise in a single movement   Falls Prevention Plan:       No modifications necessary   Total: (5 or greater = High Risk): 1 ©2010 [facility] [place] [address] [place], [place] [address] Patent #[id number]. Federal Law prohibits the replication, distribution or use without written permission from [facility] [place]   Current Medications:       Current Outpatient Medications:     amLODIPine (NORVASC) 5 mg tablet, Take 1 Tab by mouth daily. , Disp: 90 Tab, Rfl: 3    levothyroxine (SYNTHROID) 50 mcg tablet, Take 1 Tab by mouth Daily (before breakfast). , Disp: 90 Tab, Rfl: 3    lisinopril-hydroCHLOROthiazide (ZESTORETIC) 20-12.5 mg per tablet, Take 1 Tab by mouth daily. , Disp: 90 Tab, Rfl: 3    pravastatin (PRAVACHOL) 40 mg tablet, TAKE ONE TABLET BY MOUTH AT BEDTIME, Disp: 90 Tab, Rfl: 3    multivitamin (ONE A DAY) tablet, Take 1 Tab by mouth daily. , Disp: , Rfl:     cyanocobalamin 1,000 mcg tablet, Take 1,000 mcg by mouth daily. , Disp: , Rfl:     gabapentin (NEURONTIN) 300 mg capsule, 1 capsule (3) three times a day, additional 1 qhs prn  Indications: Neuropathic Pain, Disp: 360 Cap, Rfl: 3    magnesium oxide (MAG-OX) 400 mg tablet, 1 daily for muscle cramping, may have laxative effect, Disp: 90 Tab, Rfl: 2   Date Last Reviewed:  6/11/2020    Number of Personal Factors/Comorbidities that affect the Plan of Care: 0: LOW COMPLEXITY   EXAMINATION:   Pt discontinued care therefore unable to assess progress.   Observation/Orthostatic Postural Assessment:          Flattened lumbar spine  Palpation:          Hypertonic and hypersensitive R lumbar paraspinal  Joint mobility deficits at L4-5 with tenderness noted  ROM:             LUMBAR SPINE    AROM    Flexion   Extension  Right Rotation  Left Rotation  Right Sidebend  Left Sidebend 75 %  70 %  75 % pain on R side  75 %  75 % pain on L side  75 % pain on R side     Hip ER:  L-  45, R- 35 degrees  Hip IR:  L- 25, R - 25 degrees    Strength:    TA-  4/5 with poor activation          LOWER EXTREMITY      Left  Right    Knee flexion  Knee extension  Hip Ext  Hip Flexion  Hip ER  Hip IR Hip ABD-  5-/5  5-/5  5-/5  5-/5  5-/5  5-/5  5-/5 Knee flexion   Knee extension  Hip Ext  Hip Flexion  Hip ER  Hip IR  Hip ABD   4/5  4/5  4/5  4/5  3+/5  4-/5  4-/5        Special Tests:          Slump-  Positive for R LE pain when sensitized  Traction- improved c/o numbness  Clonus- positive on R LE with 5 pumps  Functional Mobility:         Gait/Ambulation:  WNL  SLS-  R LE 2 seconds with c/o knee buckling, L LE 5 seconds with improved ankle strategy   Body Structures Involved:  6. Nerves  7. Muscles Body Functions Affected:  7. Sensory/Pain  8. Neuromusculoskeletal  9. Movement Related Activities and Participation Affected:  3.  General Tasks and Demands   Number of elements (examined above) that affect the Plan of Care: 3: MODERATE COMPLEXITY   CLINICAL PRESENTATION:   Presentation: Evolving clinical presentation with changing clinical characteristics: MODERATE COMPLEXITY   CLINICAL DECISION MAKING:   Use of outcome tool(s) and clinical judgement create a POC that gives a: Questionable prediction of patient's progress: MODERATE COMPLEXITY

## 2020-07-23 PROBLEM — E53.8 B12 DEFICIENCY: Status: RESOLVED | Noted: 2019-06-20 | Resolved: 2020-07-23

## 2020-08-19 ENCOUNTER — HOSPITAL ENCOUNTER (OUTPATIENT)
Dept: CT IMAGING | Age: 75
Discharge: HOME | End: 2020-08-19
Attending: INTERNAL MEDICINE
Payer: SELF-PAY

## 2020-08-19 DIAGNOSIS — Z13.6 SCREENING FOR HEART DISEASE: ICD-10-CM

## 2020-08-19 PROCEDURE — 75571 CT HRT W/O DYE W/CA TEST: CPT

## 2020-08-21 NOTE — PROGRESS NOTES
Spoke with patient advised per  Ca Score was positive and over 400. I'd recommend seeing Cardiology for further evaluation. I'd recommend a daily baby Emily Fleeting unless contra-indicated. I'd recommend switching his Pravachol to Crestor 20 and Zetia 10. Pt expressed understanding, Informed him he will receive an call from Cardiology office for an appt and medication will be sent to his pharmacy.

## 2020-08-21 NOTE — PROGRESS NOTES
Ca Score was positive and over 400. I'd recommend seeing Cardiology for further evaluation. I'd recommend a daily baby Mariam Handing unless contra-indicated. I'd recommend switching his Pravachol to Crestor 20 and Zetia 10.

## 2020-09-03 PROBLEM — R01.1 SYSTOLIC MURMUR: Status: ACTIVE | Noted: 2020-09-03

## 2020-09-03 PROBLEM — R07.89 CHEST TIGHTNESS: Status: ACTIVE | Noted: 2020-09-03

## 2020-09-03 PROBLEM — R93.1 ELEVATED CORONARY ARTERY CALCIUM SCORE: Status: ACTIVE | Noted: 2020-09-03

## 2020-09-03 PROBLEM — I77.89 ENLARGED THORACIC AORTA (HCC): Status: ACTIVE | Noted: 2020-09-03

## 2020-09-24 PROBLEM — I10 ESSENTIAL HYPERTENSION WITH GOAL BLOOD PRESSURE LESS THAN 130/85: Status: ACTIVE | Noted: 2020-09-24

## 2020-09-24 PROBLEM — G62.9 PERIPHERAL POLYNEUROPATHY: Status: ACTIVE | Noted: 2020-09-24

## 2020-11-12 PROBLEM — I25.84 CORONARY ARTERY DISEASE DUE TO CALCIFIED CORONARY LESION: Status: ACTIVE | Noted: 2020-11-12

## 2020-11-12 PROBLEM — I25.10 CORONARY ARTERY DISEASE DUE TO CALCIFIED CORONARY LESION: Status: ACTIVE | Noted: 2020-11-12

## 2021-08-13 PROBLEM — R07.89 CHEST TIGHTNESS: Status: RESOLVED | Noted: 2020-09-03 | Resolved: 2021-08-13

## 2022-02-02 ENCOUNTER — HOSPITAL ENCOUNTER (OUTPATIENT)
Dept: SURGERY | Age: 77
Discharge: HOME | End: 2022-02-02
Payer: MEDICARE

## 2022-02-02 VITALS
HEART RATE: 69 BPM | RESPIRATION RATE: 18 BRPM | BODY MASS INDEX: 26.81 KG/M2 | HEIGHT: 74 IN | OXYGEN SATURATION: 98 % | TEMPERATURE: 98.4 F | DIASTOLIC BLOOD PRESSURE: 74 MMHG | SYSTOLIC BLOOD PRESSURE: 158 MMHG | WEIGHT: 208.9 LBS

## 2022-02-02 LAB
ATRIAL RATE: 66 BPM
BACTERIA SPEC CULT: NORMAL
CALCULATED P AXIS, ECG09: 72 DEGREES
CALCULATED R AXIS, ECG10: 59 DEGREES
CALCULATED T AXIS, ECG11: 60 DEGREES
CREAT SERPL-MCNC: 0.81 MG/DL (ref 0.8–1.5)
DIAGNOSIS, 93000: NORMAL
EST. AVERAGE GLUCOSE BLD GHB EST-MCNC: 131 MG/DL
GLUCOSE BLD STRIP.AUTO-MCNC: 110 MG/DL (ref 65–100)
HBA1C MFR BLD: 6.2 % (ref 4.2–6.3)
HGB BLD-MCNC: 13.4 G/DL (ref 13.6–17.2)
P-R INTERVAL, ECG05: 202 MS
POTASSIUM SERPL-SCNC: 3.4 MMOL/L (ref 3.5–5.1)
Q-T INTERVAL, ECG07: 414 MS
QRS DURATION, ECG06: 94 MS
QTC CALCULATION (BEZET), ECG08: 434 MS
SERVICE CMNT-IMP: ABNORMAL
SERVICE CMNT-IMP: NORMAL
VENTRICULAR RATE, ECG03: 66 BPM

## 2022-02-02 PROCEDURE — 77030027138 HC INCENT SPIROMETER -A

## 2022-02-02 PROCEDURE — 82565 ASSAY OF CREATININE: CPT

## 2022-02-02 PROCEDURE — 85018 HEMOGLOBIN: CPT

## 2022-02-02 PROCEDURE — 93005 ELECTROCARDIOGRAM TRACING: CPT

## 2022-02-02 PROCEDURE — 87641 MR-STAPH DNA AMP PROBE: CPT

## 2022-02-02 PROCEDURE — 84132 ASSAY OF SERUM POTASSIUM: CPT

## 2022-02-02 PROCEDURE — 83036 HEMOGLOBIN GLYCOSYLATED A1C: CPT

## 2022-02-02 PROCEDURE — 82962 GLUCOSE BLOOD TEST: CPT

## 2022-02-02 RX ORDER — ACETAMINOPHEN 500 MG
1000 TABLET ORAL
COMMUNITY
End: 2022-04-15

## 2022-02-02 NOTE — PERIOP NOTES
PLEASE CONTINUE TAKING ALL PRESCRIPTION MEDICATIONS UP TO THE DAY OF SURGERY UNLESS OTHERWISE DIRECTED BELOW. DISCONTINUE all vitamins and supplements 7 days prior to surgery. DISCONTINUE Non-Steriodal Anti-Inflammatory (NSAIDS) such as Advil and Aleve 5 days prior to surgery. Home Medications to take  the day of surgery    rosuvastatin   amlodipine   Aspirin 81 mg    Omeprazole    levothyroxine     Home Medications   to Hold   Vitamins, Supplements, and Herbals. Non-Steriodal Anti-Inflammatory (NSAIDS) such as Advil and Aleve. Hold the morning of surgery:  Lisinopril-hydrochlorothiazide, ezetimibe, potassium        Comments    Covid test 2/7/22 at 10:45 @ 2 2 [facility],[address], [facility]    On the day before surgery please take Acetaminophen 1000mg in the morning and then again before bed. You may substitute for Tylenol 650 mg.      Bring incentive spirometer       Please do not bring home medications with you on the day of surgery unless otherwise directed by your nurse. If you are instructed to bring home medications, please give them to your nurse as they will be administered by the nursing staff. If you have any questions, please call Health system [phone] or [facility] [phone]. A copy of this note was provided to the patient for reference. How to Use Your Incentive Spirometer       About Your Incentive Spirometer  An incentive spirometer is a device that will expand your lungs by helping you to breathe more deeply and fully. The parts of your incentive spirometer are labeled in Figure 1. Using your incentive spirometer  When youre using your incentive spirometer, make sure to breathe through your mouth. If you breathe through your nose, the incentive spirometer wont work properly. You can hold your nose if you have trouble. DO NOT BLOW INTO THE DEVICE. If you feel dizzy at any time, stop and rest. Try again at a later time.   1. Sit upright in a chair or in bed. Hold the incentive spirometer at eye level. 2. Put the mouthpiece in your mouth and close your lips tightly around it. Slowly breathe out (exhale) completely. 3. Breathe in (inhale) slowly through your mouth as deeply as you can. As you take the breath, you will see the piston rise inside the large column. While the piston rises, the indicator on the right should move upwards. It should stay in between the 2 arrows (see Figure 1). 4. Try to get the piston as high as you can, while keeping the indicator between the arrows. If the indicator doesnt stay between the arrows, youre breathing either too fast or too slow. 5. When you get it as high as you can, hold your breath for 10 seconds, or as long as possible. While youre holding your breath, the piston will slowly fall to the base of the spirometer. 6. Once the piston reaches the bottom of the spirometer, breathe out slowly through your mouth. Rest for a few seconds. 7. Repeat 10 times. Try to get the piston to the same level with each breath. 8. After each set of 10 breaths, try to cough as coughing will help loosen or clear any mucus in your lungs. 9. Put the marker at the level the piston reached on your incentive spirometer. This will be your goal next time. Repeat these steps every hour that youre awake.   Cover the mouthpiece of the incentive spirometer when you arent using it

## 2022-02-02 NOTE — PERIOP NOTES
Recent Results (from the past 12 hour(s))   HEMOGLOBIN A1C WITH EAG    Collection Time: 02/02/22  1:15 PM   Result Value Ref Range    Hemoglobin A1c 6.2 4.20 - 6.30 %    Est. average glucose 131 mg/dL   HEMOGLOBIN    Collection Time: 02/02/22  1:15 PM   Result Value Ref Range    HGB 13.4 (L) 13.6 - 17.2 g/dL   POTASSIUM    Collection Time: 02/02/22  1:15 PM   Result Value Ref Range    Potassium 3.4 (L) 3.5 - 5.1 mmol/L   CREATININE    Collection Time: 02/02/22  1:15 PM   Result Value Ref Range    Creatinine 0.81 0.8 - 1.5 MG/DL   GLUCOSE, POC    Collection Time: 02/02/22  1:20 PM   Result Value Ref Range    Glucose (POC) 110 (H) 65 - 100 mg/dL    Performed by [facility]    EKG, 12 LEAD, INITIAL    Collection Time: 02/02/22  1:30 PM   Result Value Ref Range    Ventricular Rate 66 BPM    Atrial Rate 66 BPM    P-R Interval 202 ms    QRS Duration 94 ms    Q-T Interval 414 ms    QTC Calculation (Bezet) 434 ms    Calculated P Axis 72 degrees    Calculated R Axis 59 degrees    Calculated T Axis 60 degrees    Diagnosis       Normal sinus rhythm  Normal ECG  No previous ECGs available       Reviewed, within defined limits of anesthesia protocol.     MRSA/MSSA in process- chart flagged for charge RN

## 2022-02-02 NOTE — PERIOP NOTES
Patient verified name, , and surgery as listed in [facility]. Patient provided medical/health information and PTA medications to the best of their ability. TYPE  CASE: 2  Orders per surgeon:  received  Labs per surgeon. Spine protocol: A1c    Results: pending  Labs per anesthesia protocol: Hgb, K+, creatinine. Results pending. SQBS s/h for DOS  Glucose: 110  EK22    Patient had a Ca score 468, dx of CAD- chart placed for anesthesia review. Patient COVID test date 22; Patient confirmed the appointment. The testing center is located at the [address], [place]. If appointment is needed patient provided telephone number of [phone]. Nasal Swab collected per MD order. Patient provided with and instructed on education handouts including Guide to Surgery, blood transfusions, pain management, and hand hygiene for the family and community, and [facility] brochure. Road to Recovery Spine surgery patient guide given. Instructed on incentive spirometer. Incentive spirometer given to the patient. Patient viewed spine prehab video. Hibiclens and instructions given per hospital policy. Original medication prescription bottles  visualized during patient appointment. Patient teach back successful and patient demonstrates knowledge of instruction.

## 2022-02-03 NOTE — PERIOP NOTES
Dr. Krunal Phillips reviewed chart - Stress Test 09/18/20 & Calcium Score 468 on 08/19/20 - states to continue taking aspirin 81 mg. Ok to proceed.

## 2022-02-09 ENCOUNTER — ANESTHESIA EVENT (OUTPATIENT)
Dept: SURGERY | Age: 77
DRG: 940 | End: 2022-02-09
Payer: MEDICARE

## 2022-02-10 ENCOUNTER — HOSPITAL ENCOUNTER (INPATIENT)
Age: 77
LOS: 3 days | Discharge: HOME HEALTH | DRG: 940 | End: 2022-02-14
Attending: ORTHOPAEDIC SURGERY | Admitting: ORTHOPAEDIC SURGERY
Payer: MEDICARE

## 2022-02-10 ENCOUNTER — ANESTHESIA (OUTPATIENT)
Dept: SURGERY | Age: 77
DRG: 940 | End: 2022-02-10
Payer: MEDICARE

## 2022-02-10 ENCOUNTER — APPOINTMENT (OUTPATIENT)
Dept: GENERAL RADIOLOGY | Age: 77
DRG: 940 | End: 2022-02-10
Attending: ORTHOPAEDIC SURGERY
Payer: MEDICARE

## 2022-02-10 DIAGNOSIS — N13.8 BPH WITH OBSTRUCTION/LOWER URINARY TRACT SYMPTOMS: ICD-10-CM

## 2022-02-10 DIAGNOSIS — R33.9 URINARY RETENTION: ICD-10-CM

## 2022-02-10 DIAGNOSIS — N40.1 BPH WITH OBSTRUCTION/LOWER URINARY TRACT SYMPTOMS: ICD-10-CM

## 2022-02-10 DIAGNOSIS — M48.061 SPINAL STENOSIS OF LUMBAR REGION AT MULTIPLE LEVELS: ICD-10-CM

## 2022-02-10 PROBLEM — M48.00 SPINAL STENOSIS: Status: ACTIVE | Noted: 2022-02-10

## 2022-02-10 LAB
ABO + RH BLD: NORMAL
BLOOD GROUP ANTIBODIES SERPL: NORMAL
POTASSIUM SERPL-SCNC: 4.2 MMOL/L (ref 3.5–5.1)
SPECIMEN EXP DATE BLD: NORMAL

## 2022-02-10 PROCEDURE — 97161 PT EVAL LOW COMPLEX 20 MIN: CPT

## 2022-02-10 PROCEDURE — 63047 LAM FACETEC & FORAMOT LUMBAR: CPT | Performed by: ORTHOPAEDIC SURGERY

## 2022-02-10 PROCEDURE — 74011000272 HC RX REV CODE- 272: Performed by: ORTHOPAEDIC SURGERY

## 2022-02-10 PROCEDURE — 84132 ASSAY OF SERUM POTASSIUM: CPT

## 2022-02-10 PROCEDURE — 77030029099 HC BN WAX SSPC -A: Performed by: ORTHOPAEDIC SURGERY

## 2022-02-10 PROCEDURE — 77030040361 HC SLV COMPR DVT MDII -B: Performed by: ORTHOPAEDIC SURGERY

## 2022-02-10 PROCEDURE — 77030031139 HC SUT VCRL2 J&J -A: Performed by: ORTHOPAEDIC SURGERY

## 2022-02-10 PROCEDURE — 74011000250 HC RX REV CODE- 250: Performed by: NURSE ANESTHETIST, CERTIFIED REGISTERED

## 2022-02-10 PROCEDURE — 77030037088 HC TUBE ENDOTRACH ORAL NSL COVD-A: Performed by: REGISTERED NURSE

## 2022-02-10 PROCEDURE — 01NR0ZZ RELEASE SACRAL NERVE, OPEN APPROACH: ICD-10-PCS | Performed by: ORTHOPAEDIC SURGERY

## 2022-02-10 PROCEDURE — 00NY0ZZ RELEASE LUMBAR SPINAL CORD, OPEN APPROACH: ICD-10-PCS | Performed by: ORTHOPAEDIC SURGERY

## 2022-02-10 PROCEDURE — 76010000172 HC OR TIME 2.5 TO 3 HR INTENSV-TIER 1: Performed by: ORTHOPAEDIC SURGERY

## 2022-02-10 PROCEDURE — G0378 HOSPITAL OBSERVATION PER HR: HCPCS

## 2022-02-10 PROCEDURE — 01NB0ZZ RELEASE LUMBAR NERVE, OPEN APPROACH: ICD-10-PCS | Performed by: ORTHOPAEDIC SURGERY

## 2022-02-10 PROCEDURE — 77030025623 HC BUR RND PRECIS STRY -D: Performed by: ORTHOPAEDIC SURGERY

## 2022-02-10 PROCEDURE — 74011250636 HC RX REV CODE- 250/636: Performed by: REGISTERED NURSE

## 2022-02-10 PROCEDURE — 76060000036 HC ANESTHESIA 2.5 TO 3 HR: Performed by: ORTHOPAEDIC SURGERY

## 2022-02-10 PROCEDURE — 77030018673: Performed by: ORTHOPAEDIC SURGERY

## 2022-02-10 PROCEDURE — 74011250636 HC RX REV CODE- 250/636: Performed by: NURSE ANESTHETIST, CERTIFIED REGISTERED

## 2022-02-10 PROCEDURE — 74011250637 HC RX REV CODE- 250/637: Performed by: ANESTHESIOLOGY

## 2022-02-10 PROCEDURE — 77030034479 HC ADH SKN CLSR PRINEO J&J -B: Performed by: ORTHOPAEDIC SURGERY

## 2022-02-10 PROCEDURE — 74011000250 HC RX REV CODE- 250: Performed by: REGISTERED NURSE

## 2022-02-10 PROCEDURE — 77030040922 HC BLNKT HYPOTHRM STRY -A: Performed by: REGISTERED NURSE

## 2022-02-10 PROCEDURE — 36415 COLL VENOUS BLD VENIPUNCTURE: CPT

## 2022-02-10 PROCEDURE — 77030028270 HC SRGFL HEMSTAT MTRX J&J -C: Performed by: ORTHOPAEDIC SURGERY

## 2022-02-10 PROCEDURE — 86900 BLOOD TYPING SEROLOGIC ABO: CPT

## 2022-02-10 PROCEDURE — 74011000250 HC RX REV CODE- 250: Performed by: ORTHOPAEDIC SURGERY

## 2022-02-10 PROCEDURE — 74011250637 HC RX REV CODE- 250/637: Performed by: ORTHOPAEDIC SURGERY

## 2022-02-10 PROCEDURE — 76210000006 HC OR PH I REC 0.5 TO 1 HR: Performed by: ORTHOPAEDIC SURGERY

## 2022-02-10 PROCEDURE — 74011250636 HC RX REV CODE- 250/636: Performed by: ORTHOPAEDIC SURGERY

## 2022-02-10 PROCEDURE — 77030020407 HC IV BLD WRMR ST 3M -A: Performed by: REGISTERED NURSE

## 2022-02-10 PROCEDURE — 77030019557 HC ELECTRD VES SEAL MEDT -F: Performed by: ORTHOPAEDIC SURGERY

## 2022-02-10 PROCEDURE — 97530 THERAPEUTIC ACTIVITIES: CPT

## 2022-02-10 PROCEDURE — 77030040356 HC CORD BPLR FRCP COVD -A: Performed by: ORTHOPAEDIC SURGERY

## 2022-02-10 PROCEDURE — 74011250636 HC RX REV CODE- 250/636: Performed by: ANESTHESIOLOGY

## 2022-02-10 PROCEDURE — 63048 LAM FACETEC &FORAMOT EA ADDL: CPT | Performed by: ORTHOPAEDIC SURGERY

## 2022-02-10 PROCEDURE — 2709999900 HC NON-CHARGEABLE SUPPLY: Performed by: ORTHOPAEDIC SURGERY

## 2022-02-10 PROCEDURE — 77030039425 HC BLD LARYNG TRULITE DISP TELE -A: Performed by: REGISTERED NURSE

## 2022-02-10 PROCEDURE — 72020 X-RAY EXAM OF SPINE 1 VIEW: CPT

## 2022-02-10 PROCEDURE — 77030003028 HC SUT VCRL J&J -A: Performed by: ORTHOPAEDIC SURGERY

## 2022-02-10 PROCEDURE — 77030019908 HC STETH ESOPH SIMS -A: Performed by: REGISTERED NURSE

## 2022-02-10 RX ORDER — FENTANYL CITRATE 50 UG/ML
100 INJECTION, SOLUTION INTRAMUSCULAR; INTRAVENOUS ONCE
Status: DISCONTINUED | OUTPATIENT
Start: 2022-02-10 | End: 2022-02-10 | Stop reason: HOSPADM

## 2022-02-10 RX ORDER — DIPHENHYDRAMINE HCL 25 MG
25 CAPSULE ORAL
Status: DISCONTINUED | OUTPATIENT
Start: 2022-02-10 | End: 2022-02-14 | Stop reason: HOSPADM

## 2022-02-10 RX ORDER — SODIUM CHLORIDE 9 MG/ML
50 INJECTION, SOLUTION INTRAVENOUS CONTINUOUS
Status: DISCONTINUED | OUTPATIENT
Start: 2022-02-10 | End: 2022-02-10 | Stop reason: HOSPADM

## 2022-02-10 RX ORDER — HYDROCODONE BITARTRATE AND ACETAMINOPHEN 10; 325 MG/1; MG/1
1 TABLET ORAL
Qty: 22 TABLET | Refills: 0 | Status: SHIPPED | OUTPATIENT
Start: 2022-02-10 | End: 2022-02-17

## 2022-02-10 RX ORDER — FAMOTIDINE 20 MG/1
20 TABLET, FILM COATED ORAL ONCE
Status: COMPLETED | OUTPATIENT
Start: 2022-02-10 | End: 2022-02-10

## 2022-02-10 RX ORDER — PREGABALIN 75 MG/1
75 CAPSULE ORAL
Status: DISCONTINUED | OUTPATIENT
Start: 2022-02-10 | End: 2022-02-10

## 2022-02-10 RX ORDER — DEXAMETHASONE SODIUM PHOSPHATE 4 MG/ML
INJECTION, SOLUTION INTRA-ARTICULAR; INTRALESIONAL; INTRAMUSCULAR; INTRAVENOUS; SOFT TISSUE AS NEEDED
Status: DISCONTINUED | OUTPATIENT
Start: 2022-02-10 | End: 2022-02-10 | Stop reason: HOSPADM

## 2022-02-10 RX ORDER — GLYCOPYRROLATE 0.2 MG/ML
INJECTION INTRAMUSCULAR; INTRAVENOUS AS NEEDED
Status: DISCONTINUED | OUTPATIENT
Start: 2022-02-10 | End: 2022-02-10 | Stop reason: HOSPADM

## 2022-02-10 RX ORDER — POTASSIUM CHLORIDE 20 MEQ/1
20 TABLET, EXTENDED RELEASE ORAL DAILY
Status: DISCONTINUED | OUTPATIENT
Start: 2022-02-11 | End: 2022-02-14 | Stop reason: HOSPADM

## 2022-02-10 RX ORDER — NALOXONE HYDROCHLORIDE 0.4 MG/ML
0.4 INJECTION, SOLUTION INTRAMUSCULAR; INTRAVENOUS; SUBCUTANEOUS AS NEEDED
Status: DISCONTINUED | OUTPATIENT
Start: 2022-02-10 | End: 2022-02-14 | Stop reason: HOSPADM

## 2022-02-10 RX ORDER — CEFAZOLIN SODIUM/WATER 2 G/20 ML
2 SYRINGE (ML) INTRAVENOUS EVERY 8 HOURS
Status: COMPLETED | OUTPATIENT
Start: 2022-02-10 | End: 2022-02-11

## 2022-02-10 RX ORDER — AMLODIPINE BESYLATE 5 MG/1
5 TABLET ORAL
Status: COMPLETED | OUTPATIENT
Start: 2022-02-10 | End: 2022-02-10

## 2022-02-10 RX ORDER — LIDOCAINE HYDROCHLORIDE 10 MG/ML
0.1 INJECTION INFILTRATION; PERINEURAL AS NEEDED
Status: DISCONTINUED | OUTPATIENT
Start: 2022-02-10 | End: 2022-02-10 | Stop reason: HOSPADM

## 2022-02-10 RX ORDER — LORAZEPAM 0.5 MG/1
1 TABLET ORAL
Status: DISCONTINUED | OUTPATIENT
Start: 2022-02-10 | End: 2022-02-14 | Stop reason: HOSPADM

## 2022-02-10 RX ORDER — CYCLOBENZAPRINE HCL 10 MG
5 TABLET ORAL
Status: DISCONTINUED | OUTPATIENT
Start: 2022-02-10 | End: 2022-02-14 | Stop reason: HOSPADM

## 2022-02-10 RX ORDER — ACETAMINOPHEN 500 MG
1000 TABLET ORAL ONCE
Status: COMPLETED | OUTPATIENT
Start: 2022-02-10 | End: 2022-02-10

## 2022-02-10 RX ORDER — NEOSTIGMINE METHYLSULFATE 1 MG/ML
INJECTION, SOLUTION INTRAVENOUS AS NEEDED
Status: DISCONTINUED | OUTPATIENT
Start: 2022-02-10 | End: 2022-02-10 | Stop reason: HOSPADM

## 2022-02-10 RX ORDER — PROPOFOL 10 MG/ML
INJECTION, EMULSION INTRAVENOUS AS NEEDED
Status: DISCONTINUED | OUTPATIENT
Start: 2022-02-10 | End: 2022-02-10 | Stop reason: HOSPADM

## 2022-02-10 RX ORDER — PANTOPRAZOLE SODIUM 40 MG/1
40 TABLET, DELAYED RELEASE ORAL
Status: DISCONTINUED | OUTPATIENT
Start: 2022-02-11 | End: 2022-02-14 | Stop reason: HOSPADM

## 2022-02-10 RX ORDER — HYDROCODONE BITARTRATE AND ACETAMINOPHEN 5; 325 MG/1; MG/1
1 TABLET ORAL AS NEEDED
Status: DISCONTINUED | OUTPATIENT
Start: 2022-02-10 | End: 2022-02-10 | Stop reason: HOSPADM

## 2022-02-10 RX ORDER — AMLODIPINE BESYLATE 5 MG/1
5 TABLET ORAL DAILY
Status: DISCONTINUED | OUTPATIENT
Start: 2022-02-11 | End: 2022-02-14 | Stop reason: HOSPADM

## 2022-02-10 RX ORDER — HYDROCODONE BITARTRATE AND ACETAMINOPHEN 10; 325 MG/1; MG/1
1 TABLET ORAL
Status: DISCONTINUED | OUTPATIENT
Start: 2022-02-10 | End: 2022-02-14 | Stop reason: HOSPADM

## 2022-02-10 RX ORDER — FENTANYL CITRATE 50 UG/ML
INJECTION, SOLUTION INTRAMUSCULAR; INTRAVENOUS AS NEEDED
Status: DISCONTINUED | OUTPATIENT
Start: 2022-02-10 | End: 2022-02-10 | Stop reason: HOSPADM

## 2022-02-10 RX ORDER — DEXTROSE, SODIUM CHLORIDE, AND POTASSIUM CHLORIDE 5; .45; .15 G/100ML; G/100ML; G/100ML
100 INJECTION INTRAVENOUS CONTINUOUS
Status: DISCONTINUED | OUTPATIENT
Start: 2022-02-10 | End: 2022-02-14 | Stop reason: HOSPADM

## 2022-02-10 RX ORDER — VANCOMYCIN HYDROCHLORIDE 1 G/20ML
INJECTION, POWDER, LYOPHILIZED, FOR SOLUTION INTRAVENOUS AS NEEDED
Status: DISCONTINUED | OUTPATIENT
Start: 2022-02-10 | End: 2022-02-10 | Stop reason: HOSPADM

## 2022-02-10 RX ORDER — CEFAZOLIN SODIUM/WATER 2 G/20 ML
2 SYRINGE (ML) INTRAVENOUS ONCE
Status: COMPLETED | OUTPATIENT
Start: 2022-02-10 | End: 2022-02-10

## 2022-02-10 RX ORDER — HYDROMORPHONE HYDROCHLORIDE 2 MG/ML
INJECTION, SOLUTION INTRAMUSCULAR; INTRAVENOUS; SUBCUTANEOUS AS NEEDED
Status: DISCONTINUED | OUTPATIENT
Start: 2022-02-10 | End: 2022-02-10 | Stop reason: HOSPADM

## 2022-02-10 RX ORDER — SODIUM CHLORIDE 0.9 G/100ML
IRRIGANT IRRIGATION AS NEEDED
Status: DISCONTINUED | OUTPATIENT
Start: 2022-02-10 | End: 2022-02-10 | Stop reason: HOSPADM

## 2022-02-10 RX ORDER — EPHEDRINE SULFATE/0.9% NACL/PF 50 MG/5 ML
SYRINGE (ML) INTRAVENOUS AS NEEDED
Status: DISCONTINUED | OUTPATIENT
Start: 2022-02-10 | End: 2022-02-10 | Stop reason: HOSPADM

## 2022-02-10 RX ORDER — HYDROMORPHONE HYDROCHLORIDE 1 MG/ML
1 INJECTION, SOLUTION INTRAMUSCULAR; INTRAVENOUS; SUBCUTANEOUS
Status: DISCONTINUED | OUTPATIENT
Start: 2022-02-10 | End: 2022-02-11

## 2022-02-10 RX ORDER — SODIUM CHLORIDE 0.9 % (FLUSH) 0.9 %
5-40 SYRINGE (ML) INJECTION AS NEEDED
Status: DISCONTINUED | OUTPATIENT
Start: 2022-02-10 | End: 2022-02-14 | Stop reason: HOSPADM

## 2022-02-10 RX ORDER — BUPIVACAINE HYDROCHLORIDE AND EPINEPHRINE 2.5; 5 MG/ML; UG/ML
INJECTION, SOLUTION EPIDURAL; INFILTRATION; INTRACAUDAL; PERINEURAL AS NEEDED
Status: DISCONTINUED | OUTPATIENT
Start: 2022-02-10 | End: 2022-02-10 | Stop reason: HOSPADM

## 2022-02-10 RX ORDER — HYDROMORPHONE HYDROCHLORIDE 2 MG/ML
0.5 INJECTION, SOLUTION INTRAMUSCULAR; INTRAVENOUS; SUBCUTANEOUS
Status: DISCONTINUED | OUTPATIENT
Start: 2022-02-10 | End: 2022-02-10 | Stop reason: HOSPADM

## 2022-02-10 RX ORDER — ACETAMINOPHEN 500 MG
1000 TABLET ORAL
Status: DISCONTINUED | OUTPATIENT
Start: 2022-02-10 | End: 2022-02-14 | Stop reason: HOSPADM

## 2022-02-10 RX ORDER — SODIUM CHLORIDE 0.9 % (FLUSH) 0.9 %
5-40 SYRINGE (ML) INJECTION AS NEEDED
Status: DISCONTINUED | OUTPATIENT
Start: 2022-02-10 | End: 2022-02-10 | Stop reason: HOSPADM

## 2022-02-10 RX ORDER — LEVOTHYROXINE SODIUM 50 UG/1
50 TABLET ORAL
Status: DISCONTINUED | OUTPATIENT
Start: 2022-02-11 | End: 2022-02-14 | Stop reason: HOSPADM

## 2022-02-10 RX ORDER — DOCUSATE SODIUM 100 MG/1
100 CAPSULE, LIQUID FILLED ORAL 2 TIMES DAILY
Status: DISCONTINUED | OUTPATIENT
Start: 2022-02-10 | End: 2022-02-14 | Stop reason: HOSPADM

## 2022-02-10 RX ORDER — ROCURONIUM BROMIDE 10 MG/ML
INJECTION, SOLUTION INTRAVENOUS AS NEEDED
Status: DISCONTINUED | OUTPATIENT
Start: 2022-02-10 | End: 2022-02-10 | Stop reason: HOSPADM

## 2022-02-10 RX ORDER — ONDANSETRON 2 MG/ML
INJECTION INTRAMUSCULAR; INTRAVENOUS AS NEEDED
Status: DISCONTINUED | OUTPATIENT
Start: 2022-02-10 | End: 2022-02-10 | Stop reason: HOSPADM

## 2022-02-10 RX ORDER — SODIUM CHLORIDE, SODIUM LACTATE, POTASSIUM CHLORIDE, CALCIUM CHLORIDE 600; 310; 30; 20 MG/100ML; MG/100ML; MG/100ML; MG/100ML
150 INJECTION, SOLUTION INTRAVENOUS CONTINUOUS
Status: DISCONTINUED | OUTPATIENT
Start: 2022-02-10 | End: 2022-02-10 | Stop reason: HOSPADM

## 2022-02-10 RX ORDER — SODIUM CHLORIDE 0.9 % (FLUSH) 0.9 %
5-40 SYRINGE (ML) INJECTION EVERY 8 HOURS
Status: DISCONTINUED | OUTPATIENT
Start: 2022-02-10 | End: 2022-02-10 | Stop reason: HOSPADM

## 2022-02-10 RX ORDER — ACETAMINOPHEN 500 MG
1000 TABLET ORAL
Status: DISCONTINUED | OUTPATIENT
Start: 2022-02-10 | End: 2022-02-10 | Stop reason: HOSPADM

## 2022-02-10 RX ORDER — ROSUVASTATIN CALCIUM 20 MG/1
20 TABLET, COATED ORAL DAILY
Status: DISCONTINUED | OUTPATIENT
Start: 2022-02-11 | End: 2022-02-14 | Stop reason: HOSPADM

## 2022-02-10 RX ORDER — LIDOCAINE HYDROCHLORIDE 20 MG/ML
INJECTION, SOLUTION EPIDURAL; INFILTRATION; INTRACAUDAL; PERINEURAL AS NEEDED
Status: DISCONTINUED | OUTPATIENT
Start: 2022-02-10 | End: 2022-02-10 | Stop reason: HOSPADM

## 2022-02-10 RX ORDER — SODIUM CHLORIDE 0.9 % (FLUSH) 0.9 %
5-40 SYRINGE (ML) INJECTION EVERY 8 HOURS
Status: DISCONTINUED | OUTPATIENT
Start: 2022-02-10 | End: 2022-02-14 | Stop reason: HOSPADM

## 2022-02-10 RX ORDER — MIDAZOLAM HYDROCHLORIDE 1 MG/ML
1 INJECTION, SOLUTION INTRAMUSCULAR; INTRAVENOUS
Status: COMPLETED | OUTPATIENT
Start: 2022-02-10 | End: 2022-02-10

## 2022-02-10 RX ORDER — LISINOPRIL AND HYDROCHLOROTHIAZIDE 12.5; 2 MG/1; MG/1
1 TABLET ORAL DAILY
Status: DISCONTINUED | OUTPATIENT
Start: 2022-02-11 | End: 2022-02-14 | Stop reason: HOSPADM

## 2022-02-10 RX ORDER — EZETIMIBE 10 MG/1
10 TABLET ORAL DAILY
Status: DISCONTINUED | OUTPATIENT
Start: 2022-02-11 | End: 2022-02-14 | Stop reason: HOSPADM

## 2022-02-10 RX ADMIN — SODIUM CHLORIDE, PRESERVATIVE FREE 10 ML: 5 INJECTION INTRAVENOUS at 14:45

## 2022-02-10 RX ADMIN — PHENYLEPHRINE HYDROCHLORIDE 60 MCG: 10 INJECTION INTRAVENOUS at 09:30

## 2022-02-10 RX ADMIN — DEXAMETHASONE SODIUM PHOSPHATE 4 MG: 4 INJECTION, SOLUTION INTRAMUSCULAR; INTRAVENOUS at 08:25

## 2022-02-10 RX ADMIN — Medication 5 MG: at 08:25

## 2022-02-10 RX ADMIN — Medication 5 MG: at 09:01

## 2022-02-10 RX ADMIN — PHENYLEPHRINE HYDROCHLORIDE 120 MCG: 10 INJECTION INTRAVENOUS at 09:17

## 2022-02-10 RX ADMIN — CEFAZOLIN 2 G: 10 INJECTION, POWDER, FOR SOLUTION INTRAVENOUS at 06:26

## 2022-02-10 RX ADMIN — ROCURONIUM BROMIDE 10 MG: 10 INJECTION, SOLUTION INTRAVENOUS at 09:07

## 2022-02-10 RX ADMIN — ROCURONIUM BROMIDE 40 MG: 10 INJECTION, SOLUTION INTRAVENOUS at 08:02

## 2022-02-10 RX ADMIN — PREGABALIN 225 MG: 150 CAPSULE ORAL at 21:30

## 2022-02-10 RX ADMIN — ACETAMINOPHEN 1000 MG: 500 TABLET ORAL at 06:27

## 2022-02-10 RX ADMIN — HYDROMORPHONE HYDROCHLORIDE 1 MG: 1 INJECTION, SOLUTION INTRAMUSCULAR; INTRAVENOUS; SUBCUTANEOUS at 20:39

## 2022-02-10 RX ADMIN — SODIUM CHLORIDE, SODIUM LACTATE, POTASSIUM CHLORIDE, AND CALCIUM CHLORIDE 150 ML/HR: 600; 310; 30; 20 INJECTION, SOLUTION INTRAVENOUS at 06:27

## 2022-02-10 RX ADMIN — PHENYLEPHRINE HYDROCHLORIDE 120 MCG: 10 INJECTION INTRAVENOUS at 09:47

## 2022-02-10 RX ADMIN — HYDROCODONE BITARTRATE AND ACETAMINOPHEN 1 TABLET: 10; 325 TABLET ORAL at 14:43

## 2022-02-10 RX ADMIN — PHENYLEPHRINE HYDROCHLORIDE 60 MCG: 10 INJECTION INTRAVENOUS at 09:39

## 2022-02-10 RX ADMIN — FAMOTIDINE 20 MG: 20 TABLET ORAL at 06:27

## 2022-02-10 RX ADMIN — Medication 5 MG: at 09:17

## 2022-02-10 RX ADMIN — LIDOCAINE HYDROCHLORIDE 100 MG: 20 INJECTION, SOLUTION EPIDURAL; INFILTRATION; INTRACAUDAL; PERINEURAL at 08:00

## 2022-02-10 RX ADMIN — HYDROCODONE BITARTRATE AND ACETAMINOPHEN 1 TABLET: 10; 325 TABLET ORAL at 19:07

## 2022-02-10 RX ADMIN — Medication 10 MG: at 08:50

## 2022-02-10 RX ADMIN — SODIUM CHLORIDE, SODIUM LACTATE, POTASSIUM CHLORIDE, AND CALCIUM CHLORIDE: 600; 310; 30; 20 INJECTION, SOLUTION INTRAVENOUS at 10:00

## 2022-02-10 RX ADMIN — FENTANYL CITRATE 100 MCG: 50 INJECTION INTRAMUSCULAR; INTRAVENOUS at 08:00

## 2022-02-10 RX ADMIN — Medication 3 AMPULE: at 06:27

## 2022-02-10 RX ADMIN — PROPOFOL 40 MG: 10 INJECTION, EMULSION INTRAVENOUS at 08:12

## 2022-02-10 RX ADMIN — Medication 1 AMPULE: at 21:30

## 2022-02-10 RX ADMIN — SODIUM CHLORIDE, PRESERVATIVE FREE 10 ML: 5 INJECTION INTRAVENOUS at 21:30

## 2022-02-10 RX ADMIN — CEFAZOLIN SODIUM 2 G: 100 INJECTION, POWDER, LYOPHILIZED, FOR SOLUTION INTRAVENOUS at 18:01

## 2022-02-10 RX ADMIN — CYCLOBENZAPRINE 5 MG: 10 TABLET, FILM COATED ORAL at 22:23

## 2022-02-10 RX ADMIN — PHENYLEPHRINE HYDROCHLORIDE 60 MCG: 10 INJECTION INTRAVENOUS at 09:44

## 2022-02-10 RX ADMIN — HYDROMORPHONE HYDROCHLORIDE 0.4 MG: 2 INJECTION INTRAMUSCULAR; INTRAVENOUS; SUBCUTANEOUS at 09:58

## 2022-02-10 RX ADMIN — ONDANSETRON 4 MG: 2 INJECTION INTRAMUSCULAR; INTRAVENOUS at 08:25

## 2022-02-10 RX ADMIN — CYCLOBENZAPRINE 5 MG: 10 TABLET, FILM COATED ORAL at 14:43

## 2022-02-10 RX ADMIN — PHENYLEPHRINE HYDROCHLORIDE 120 MCG: 10 INJECTION INTRAVENOUS at 09:01

## 2022-02-10 RX ADMIN — GLYCOPYRROLATE 0.4 MG: 0.2 INJECTION, SOLUTION INTRAMUSCULAR; INTRAVENOUS at 09:52

## 2022-02-10 RX ADMIN — PROPOFOL 160 MG: 10 INJECTION, EMULSION INTRAVENOUS at 08:01

## 2022-02-10 RX ADMIN — MIDAZOLAM 1 MG: 1 INJECTION INTRAMUSCULAR; INTRAVENOUS at 07:11

## 2022-02-10 RX ADMIN — Medication 5 MG: at 08:28

## 2022-02-10 RX ADMIN — Medication 3 MG: at 09:52

## 2022-02-10 RX ADMIN — DEXTROSE MONOHYDRATE, SODIUM CHLORIDE, AND POTASSIUM CHLORIDE 100 ML/HR: 50; 4.5; 1.49 INJECTION, SOLUTION INTRAVENOUS at 15:01

## 2022-02-10 RX ADMIN — AMLODIPINE BESYLATE 5 MG: 5 TABLET ORAL at 07:10

## 2022-02-10 RX ADMIN — HYDROCODONE BITARTRATE AND ACETAMINOPHEN 1 TABLET: 5; 325 TABLET ORAL at 12:37

## 2022-02-10 RX ADMIN — Medication 10 MG: at 10:06

## 2022-02-10 RX ADMIN — DOCUSATE SODIUM 100 MG: 100 CAPSULE, LIQUID FILLED ORAL at 18:01

## 2022-02-10 RX ADMIN — PHENYLEPHRINE HYDROCHLORIDE 120 MCG: 10 INJECTION INTRAVENOUS at 10:06

## 2022-02-10 NOTE — DISCHARGE INSTRUCTIONS
Lumbar Laminectomy for Spinal Stenosis: What to Expect at [address] can expect your back to feel stiff or sore after surgery. This should improve in the weeks after surgery. You may have trouble sitting or standing in one position for very long and may need pain medicine in the weeks after your surgery. Your doctor may advise you to work with a physical therapist to strengthen the muscles around your spine and trunk. You will need to learn how to lift, twist, and bend so that you do not put too much strain on your back. This care sheet gives you a general idea about how long it will take for you to recover. But each person recovers at a different pace. Follow the steps below to get better as quickly as possible. How can you care for yourself at home? Activity  · Rest when you feel tired. Getting enough sleep will help you recover. · Try to walk each day. Start by walking a little more than you did the day before. Bit by bit, increase the amount you walk. Walking boosts blood flow and helps prevent pneumonia and constipation. Walking may also decrease your muscle soreness after surgery. · If advised by your doctor, you may need to avoid lifting anything that would cause excessive strain on your back. This may include a child, heavy grocery bags and milk containers, a heavy briefcase or backpack, cat litter or dog food bags, or a vacuum . · Avoid strenuous activities, such as bicycle riding, jogging, weight lifting, or aerobic exercise, until your doctor says it is okay. · Do not drive for 2 to 4 weeks after your surgery or until your doctor says it is okay. · Avoid riding in a car for more than 30 minutes at a time for 2 to 4 weeks after surgery. If you must ride in a car for a longer distance, stop often to walk and stretch your legs. · Try to change your position about every 30 minutes while sitting or standing. This will help decrease your back pain while you are healing.   · You will probably need to take 4 to 6 weeks off from work. It depends on the type of work you do and how you feel. · You may have sex as soon as you feel able, but avoid positions that put stress on your back or cause pain. Diet  · You can eat your normal diet. If your stomach is upset, try bland, low-fat foods like plain rice, broiled chicken, toast, and yogurt. · Drink plenty of fluids (unless your doctor tells you not to). · You may notice that your bowel movements are not regular right after your surgery. This is common. Try to avoid constipation and straining with bowel movements. You may want to take a fiber supplement every day. If you have not had a bowel movement after a couple of days, ask your doctor about taking a mild laxative. Medicines  · Take pain medicines exactly as directed. ¨ If the doctor gave you a prescription medicine for pain, take it as prescribed. ¨ If you are not taking a prescription pain medicine, ask your doctor if you can take an over-the-counter medicine. · If your doctor prescribed antibiotics, take them as directed. Do not stop taking them just because you feel better. You need to take the full course of antibiotics. · If you think your pain medicine is making you sick to your stomach:  ¨ Take your medicine after meals (unless your doctor has told you not to). ¨ Ask your doctor for a different pain medicine. Incision care  · If you have strips of tape on the cut (incision) the doctor made, leave the tape on for a week or until it falls off. · Wash the area daily with warm, soapy water and pat it dry. · Keep the area clean and dry. You may cover it with a gauze bandage if it weeps or rubs against clothing. Change the bandage every day. Exercise  · Do back exercises as instructed by your doctor. · Your doctor may advise you to work with a physical therapist to improve the strength and flexibility of your back.   Other instructions  · Use a heating pad, a warm water bottle, or a warm cloth on your back to reduce stiffness. Do not go to sleep with a heating pad on your skin. Put a thin cloth between the heating pad and your skin. Follow-up care is a key part of your treatment and safety. Be sure to make and go to all appointments, and call your doctor if you are having problems. Its also a good idea to know your test results and keep a list of the medicines you take. When should you call for help? Call 911 anytime you think you may need emergency care. For example, call if:  · You passed out (lost consciousness). · You have sudden chest pain and shortness of breath, or you cough up blood. · You lose bladder or bowel control. · One or both legs suddenly feel weak or numb. Call your doctor now or seek immediate medical care if:  · You have pain that does not get better after you take pain pills. · You have a headache that does not get better when you take medicine for it. · You have loose stitches, or your incision comes open. · You have blood or fluid draining from the incision. · You have signs of infection, such as:  ¨ Increased pain, swelling, warmth, or redness. ¨ Pus draining from the incision. ¨ A fever. ¨ Red streaks leading from the incision. Watch closely for any changes in your health, and be sure to contact your doctor if:  · You have new numbness or tingling in your legs. · You have new pain or weakness in your legs. · You do not have a bowel movement after taking a laxative. Where can you learn more? Go to [employer]  Enter D006 in the search box to learn more about \"Lumbar Laminectomy for Spinal Stenosis: What to Expect at Home. \"   © 7583-2174 Healthwise, Incorporated. Care instructions adapted under license by [employer] (which disclaims liability or warranty for this information).  This care instruction is for use with your licensed healthcare professional. If you have questions about a medical condition or this instruction, always ask your healthcare professional. [place] [address] any warranty or liability for your use of this information. Content Version: 0.1.851831;  Last Revised: March 13, 2012

## 2022-02-10 NOTE — ANESTHESIA POSTPROCEDURE EVALUATION
Procedure(s):  L2-S1  LAMINECTOMY . general    Anesthesia Post Evaluation      Multimodal analgesia: multimodal analgesia used between 6 hours prior to anesthesia start to PACU discharge  Patient location during evaluation: bedside  Patient participation: complete - patient participated  Level of consciousness: awake and alert  Pain management: adequate  Airway patency: patent  Anesthetic complications: no  Cardiovascular status: hemodynamically stable  Respiratory status: spontaneous ventilation  Hydration status: euvolemic  Comments: Patient stable and may discharge at this time. Post anesthesia nausea and vomiting:  none  Final Post Anesthesia Temperature Assessment:  Normothermia (36.0-37.5 degrees C)      INITIAL Post-op Vital signs:   Vitals Value Taken Time   /65 02/10/22 1211   Temp 36.4 °C (97.6 °F) 02/10/22 1206   Pulse 65 02/10/22 1212   Resp 15 02/10/22 1206   SpO2 98 % 02/10/22 1212   Vitals shown include unvalidated device data.

## 2022-02-10 NOTE — PERIOP NOTES
TRANSFER - OUT REPORT:    Verbal report given to [address], RN on María Yates  being transferred to  for routine post - op       Report consisted of patients Situation, Background, Assessment and   Recommendations(SBAR). Information from the following report(s) SBAR, OR Summary, MAR and Cardiac Rhythm nsr was reviewed with the receiving nurse. Lines:   Peripheral IV 02/10/22 Left;Posterior Wrist (Active)   Site Assessment Clean, dry, & intact 02/10/22 1206   Phlebitis Assessment 0 02/10/22 1206   Infiltration Assessment 0 02/10/22 1206   Dressing Status Clean, dry, & intact 02/10/22 1206   Dressing Type Tape;Transparent 02/10/22 1206   Hub Color/Line Status Patent 02/10/22 1206   Alcohol Cap Used No 02/10/22 1026        Opportunity for questions and clarification was provided. Patient transported with:   O2 @ 2 liters  Tech    VTE prophylaxis orders have been written for María Yates. Patient and family given floor number and nurses name. Family updated re: pt status after security code verified.

## 2022-02-10 NOTE — PROGRESS NOTES
's pre-procedure visit requested by patient. Conveyed care and concern for patient and family. Offered prayer as requested for patient, family, and staff.     Leticia Hall MDiv, BS  Board Certified

## 2022-02-10 NOTE — ANESTHESIA PREPROCEDURE EVALUATION
Relevant Problems   CARDIOVASCULAR   (+) Coronary artery disease due to calcified coronary lesion   (+) Essential hypertension   (+) Systolic murmur      ENDOCRINE   (+) Hypothyroidism due to acquired atrophy of thyroid       Anesthetic History               Review of Systems / Medical History  Patient summary reviewed and pertinent labs reviewed    Pulmonary          Smoker (quit 20 years)         Neuro/Psych   Within defined limits           Cardiovascular    Hypertension: well controlled              Exercise tolerance: >4 METS     GI/Hepatic/Renal     GERD: well controlled           Endo/Other      Hypothyroidism: well controlled       Other Findings              Physical Exam    Airway  Mallampati: II  TM Distance: 4 - 6 cm  Neck ROM: normal range of motion   Mouth opening: Normal     Cardiovascular    Rhythm: regular  Rate: normal    Murmur: Grade 2, Aortic area     Dental    Dentition: Caps/crowns     Pulmonary  Breath sounds clear to auscultation               Abdominal         Other Findings            Anesthetic Plan    ASA: 2  Anesthesia type: general          Induction: Intravenous  Anesthetic plan and risks discussed with: Patient and Spouse

## 2022-02-10 NOTE — PROGRESS NOTES
ACUTE PHYSICAL THERAPY GOALS:  (Developed with and agreed upon by patient and/or caregiver.)  1. Pt will perform bed mobility (I) without cues in 7 therapy sessions. 2. Pt will perform all transfers (I) in 7 therapy sessions. 3. Pt will ambulate 750 ft under (S) with no LOB and breaks as needed in 7 therapy sessions. 4. Pt will perform standing balance activities with minimal postural sway in 7 therapy sessions. 5. Pt will tolerate multiple sets and reps of BLE exercises in 7 therapy sessions. PHYSICAL THERAPY ASSESSMENT: Initial Assessment, Daily Note and PM PT Treatment Day # 1      Samra Ruiz is a 68 y.o. male   PRIMARY DIAGNOSIS: Spinal stenosis of lumbar region at multiple levels  Lumbar stenosis with neurogenic claudication [M48.062]  Spinal stenosis [M48.00]  Procedure(s) (LRB):  L2-S1  LAMINECTOMY  (N/A)  Day of Surgery  Reason for Referral:    ICD-10: Treatment Diagnosis: Difficulty in walking, Not elsewhere classified (R26.2)  Low Back Pain (M54.5)  OBSERV[place] / [place] / Product Type: Medicare /     ASSESSMENT:     REHAB RECOMMENDATIONS:   Recommendation to date pending progress:  Setting:   Home Health Therapy  Equipment:    To Be Determined     PRIOR LEVEL OF FUNCTION:  (Prior to Hospitalization) INITIAL/CURRENT LEVEL OF FUNCTION:  (Most Recently Demonstrated)   Bed Mobility:   Independent  Sit to Stand:   Independent  Transfers:   Independent  Gait/Mobility:   Independent Bed Mobility:   Standby Assistance  Sit to Stand:  [facility] [employer] Assistance  Transfers:   Standby Assistance  Gait/Mobility:   Contact Guard Assistance     ASSESSMENT:  Mr. Bailey Atkins Is a 68 y.o M presenting to PT POD 0 s/p L2-S1 laminectomy. PTA pt lives c his spouse in a 2-level home c 2-3 FABIAN and bedrooms on second floor. Pt reports he is functionally (I) for all needs at baseline without use of an AD; he cont to drive.  At time of initial evaluation, pt presents slightly below baseline LOF with inc pain as well as deficits in bed mobility, transfers, standing dynamic balance, gait and activity tolerance limiting his overall functional mobility. Today, pt performed all mobility with SB (A), inc time and cueing aside from ambulation during which CG (A) was provided d/t inc postural sway and intermittent unsteadiness. Of note, pt moving fairly well on his feet considering POD 0 status although he ambulates c shuffling nature and inc postural sway; pt also required 2-3 stand attempts from EOB but was ultimately able to do so without physical (A) from therapist. At this time, pt is an appropriate candidate for skilled PT and will benefit from POC designed to address the aforementioned deficits. Upon completion of treatment, pt was positioned to comfort in bed with needs in reach and wife at bedside. RN was made aware of pt performance.      DC Recommendation: Home c HH and Spousal (S)/ (A)     SUBJECTIVE:   Mr. Rosaura Dueñas states, \"I'm feeling alright\"    SOCIAL HISTORY/LIVING ENVIRONMENT:   Home Environment: Private residence  One/Two Story Residence: Two story  Living Alone: No  Support Systems: Spouse/Significant Other  OBJECTIVE:     PAIN: VITAL SIGNS: LINES/DRAINS:   Pre Treatment: Pain Screen  Pain Scale 1: Numeric (0 - 10)  Pain Intensity 1: 1  Pain Onset 1: post op  Pain Location 1: Back  Pain Orientation 1: Lower  Pain Description 1: Aching  Pain Intervention(s) 1: Ambulation/Increased Activity  Post Treatment: 0 Vital Signs  O2 Sat (%): 94 %  O2 Device: None (Room air) Hemovac  O2 Device: None (Room air)     GROSS EVALUATION:  BLE Within Functional Limits Abnormal/ Functional Abnormal/ Non-Functional (see comments) Not Tested Comments:   AROM [x] [] [] []    PROM [] [] [] []    Strength [] [x] [] []    Balance [] [x] [] []    Posture [x] [] [] []    Sensation [x] [] [] []    Coordination [x] [] [] []    Tone [x] [] [] []    Edema [x] [] [] []    Activity Tolerance [] [x] [] []     [] [] [] []      COGNITION/  PERCEPTION: Intact Impaired   (see comments) Comments:   Orientation [x] []    Vision [x] []    Hearing [x] []    Command Following [x] [] Instructed on log-roll; good performance   Safety Awareness [x] []     [] []      MOBILITY: I Mod I S SBA CGA Min Mod Max Total  NT x2 Comments:   Bed Mobility    Rolling [] [] [] [x] [] [] [] [] [] [] []    Supine to Sit [] [] [] [x] [] [] [] [] [] [] []    Scooting [] [] [] [x] [] [] [] [] [] [] []    Sit to Supine [] [] [] [x] [] [] [] [] [] [] []    Transfers    Sit to Stand [] [] [] [x] [] [] [] [] [] [] []    Bed to Chair [] [] [] [] [] [] [] [] [] [x] []    Stand to Sit [] [] [] [x] [] [] [] [] [] [] []    I=Independent, Mod I=Modified Independent, S=Supervision, SBA=Standby Assistance, CGA=Contact Guard Assistance,   Min=Minimal Assistance, Mod=Moderate Assistance, Max=Maximal Assistance, Total=Total Assistance, NT=Not Tested  GAIT: I Mod I S SBA CGA Min Mod Max Total  NT x2 Comments:   Level of Assistance [] [] [] [] [x] [] [] [] [] [] []    Distance 10'x1    DME intermittent HHA    Gait Quality Slow, shuffling, steady c HHA    Weightbearing Status N/A     I=Independent, Mod I=Modified Independent, S=Supervision, SBA=Standby Assistance, CGA=Contact Guard Assistance,   Min=Minimal Assistance, Mod=Moderate Assistance, Max=Maximal Assistance, Total=Total Assistance, NT=Not Tested    [facility] Form       How much difficulty does the patient currently have. .. Unable A Lot A Little None   1. Turning over in bed (including adjusting bedclothes, sheets and blankets)? [] 1   [] 2   [] 3   [x] 4   2. Sitting down on and standing up from a chair with arms ( e.g., wheelchair, bedside commode, etc.)   [] 1   [] 2   [] 3   [x] 4   3. Moving from lying on back to sitting on the side of the bed?    [] 1   [] 2   [] 3   [x] 4   How much help from another person does the patient currently need... Total A Lot A Little None   4. Moving to and from a bed to a chair (including a wheelchair)? [] 1   [] 2   [] 3   [x] 4   5. Need to walk in hospital room? [] 1   [] 2   [x] 3   [] 4   6. Climbing 3-5 steps with a railing? [] 1   [] 2   [x] 3   [] 4   © 2007, Trustees of [address] [place] [address] [address] [address], under license to [employer]. All rights reserved     Score:  Initial: 22 Most Recent: X (Date: -- )    Interpretation of Tool:  Represents activities that are increasingly more difficult (i.e. Bed mobility, Transfers, Gait). PLAN:   FREQUENCY/DURATION: PT Plan of Care: BID for duration of hospital stay or until stated goals are met, whichever comes first.    PROBLEM LIST:   (Skilled intervention is medically necessary to address:)  1. Decreased Activity Tolerance  2. Decreased Balance  3. Decreased Gait Ability  4. Decreased Strength  5. Decreased Transfer Abilities  6. Increased Pain   INTERVENTIONS PLANNED:   (Benefits and precautions of physical therapy have been discussed with the patient.)  1. Therapeutic Activity  2. Therapeutic Exercise/HEP  3. Neuromuscular Re-education  4. Gait Training  5. Manual Therapy  6. Education     TREATMENT:     EVALUATION: Low Complexity : (Untimed Charge)    TREATMENT:   ($$ Therapeutic Activity: 8-22 mins    )  Therapeutic Activity (10 Minutes): Therapeutic activity included Rolling, Supine to Sit, Sit to Supine, Scooting, Lateral Scooting, Transfer Training, Ambulation on level ground, Sitting balance  and Standing balance to improve functional Mobility, Strength and Activity tolerance.     TREATMENT GRID:  N/A    AFTER TREATMENT POSITION/PRECAUTIONS:  Alarm Activated, Bed, Needs within reach, RN notified and Visitors at bedside    INTERDISCIPLINARY COLLABORATION:  RN/PCT and PT/PTA    TOTAL TREATMENT DURATION:  PT Patient Time In/Time Out  Time In: [facility]  Time Out: [address], PT

## 2022-02-10 NOTE — BRIEF OP NOTE
Brief Postoperative Note    Patient: Samra Ruiz  YOB: 1945  MRN: [id number]    Date of Procedure: 2/10/2022     Pre-Op Diagnosis: Lumbar stenosis with neurogenic claudication [M48.062]    Post-Op Diagnosis: L2-S1 spinal stenosis    Procedure(s):  L2-S1  LAMINECTOMY     Surgeon(s):  Felicia Phillips MD    Surgical Assistant: None    Anesthesia: General     Estimated Blood Loss (mL): 153LO    Complications: \none    Specimens: * No specimens in log *     Implants: * No implants in log *    Drains:   Hemovac Lower Back (Active)       Findings: stenosis    Electronically Signed by Evie Ramirez MD on 2/10/2022 at 9:58 AM

## 2022-02-11 ENCOUNTER — HOME HEALTH ADMISSION (OUTPATIENT)
Dept: HOME HEALTH SERVICES | Facility: HOME HEALTH | Age: 77
End: 2022-02-11
Payer: MEDICARE

## 2022-02-11 LAB
APPEARANCE UR: CLEAR
BILIRUB UR QL: NEGATIVE
COLOR UR: YELLOW
GLUCOSE UR STRIP.AUTO-MCNC: NEGATIVE MG/DL
HGB UR QL STRIP: NEGATIVE
KETONES UR QL STRIP.AUTO: NEGATIVE MG/DL
LEUKOCYTE ESTERASE UR QL STRIP.AUTO: NEGATIVE
NITRITE UR QL STRIP.AUTO: NEGATIVE
PH UR STRIP: 6.5 [PH] (ref 5–9)
PROT UR STRIP-MCNC: NEGATIVE MG/DL
SP GR UR REFRACTOMETRY: 1.02 (ref 1–1.02)
UROBILINOGEN UR QL STRIP.AUTO: 0.2 EU/DL (ref 0.2–1)

## 2022-02-11 PROCEDURE — 81003 URINALYSIS AUTO W/O SCOPE: CPT

## 2022-02-11 PROCEDURE — 65270000029 HC RM PRIVATE

## 2022-02-11 PROCEDURE — G0378 HOSPITAL OBSERVATION PER HR: HCPCS

## 2022-02-11 PROCEDURE — 74011250636 HC RX REV CODE- 250/636: Performed by: NURSE PRACTITIONER

## 2022-02-11 PROCEDURE — 94762 N-INVAS EAR/PLS OXIMTRY CONT: CPT

## 2022-02-11 PROCEDURE — 87086 URINE CULTURE/COLONY COUNT: CPT

## 2022-02-11 PROCEDURE — 74011250637 HC RX REV CODE- 250/637: Performed by: ORTHOPAEDIC SURGERY

## 2022-02-11 PROCEDURE — 97530 THERAPEUTIC ACTIVITIES: CPT

## 2022-02-11 PROCEDURE — 74011250637 HC RX REV CODE- 250/637: Performed by: NURSE PRACTITIONER

## 2022-02-11 PROCEDURE — 74011250636 HC RX REV CODE- 250/636: Performed by: ORTHOPAEDIC SURGERY

## 2022-02-11 PROCEDURE — 74011000250 HC RX REV CODE- 250: Performed by: ORTHOPAEDIC SURGERY

## 2022-02-11 RX ORDER — OXYCODONE HYDROCHLORIDE 5 MG/1
10 TABLET ORAL
Status: DISCONTINUED | OUTPATIENT
Start: 2022-02-11 | End: 2022-02-14 | Stop reason: HOSPADM

## 2022-02-11 RX ORDER — NITROFURANTOIN MACROCRYSTALS 50 MG/1
50 CAPSULE ORAL EVERY 6 HOURS
Status: DISCONTINUED | OUTPATIENT
Start: 2022-02-11 | End: 2022-02-11

## 2022-02-11 RX ORDER — HYDROMORPHONE HYDROCHLORIDE 1 MG/ML
2 INJECTION, SOLUTION INTRAMUSCULAR; INTRAVENOUS; SUBCUTANEOUS
Status: DISCONTINUED | OUTPATIENT
Start: 2022-02-11 | End: 2022-02-14 | Stop reason: HOSPADM

## 2022-02-11 RX ORDER — KETOROLAC TROMETHAMINE 30 MG/ML
30 INJECTION, SOLUTION INTRAMUSCULAR; INTRAVENOUS
Status: COMPLETED | OUTPATIENT
Start: 2022-02-11 | End: 2022-02-11

## 2022-02-11 RX ORDER — KETOROLAC TROMETHAMINE 15 MG/ML
15 INJECTION, SOLUTION INTRAMUSCULAR; INTRAVENOUS EVERY 6 HOURS
Status: COMPLETED | OUTPATIENT
Start: 2022-02-11 | End: 2022-02-12

## 2022-02-11 RX ADMIN — OXYCODONE 10 MG: 5 TABLET ORAL at 05:36

## 2022-02-11 RX ADMIN — NITROFURANTOIN MACROCRYSTALS 50 MG: 50 CAPSULE ORAL at 11:37

## 2022-02-11 RX ADMIN — SODIUM CHLORIDE, PRESERVATIVE FREE 10 ML: 5 INJECTION INTRAVENOUS at 14:49

## 2022-02-11 RX ADMIN — HYDROMORPHONE HYDROCHLORIDE 2 MG: 1 INJECTION, SOLUTION INTRAMUSCULAR; INTRAVENOUS; SUBCUTANEOUS at 19:34

## 2022-02-11 RX ADMIN — OXYCODONE 10 MG: 5 TABLET ORAL at 01:40

## 2022-02-11 RX ADMIN — HYDROCODONE BITARTRATE AND ACETAMINOPHEN 1 TABLET: 10; 325 TABLET ORAL at 14:39

## 2022-02-11 RX ADMIN — DOCUSATE SODIUM 100 MG: 100 CAPSULE, LIQUID FILLED ORAL at 08:28

## 2022-02-11 RX ADMIN — AMLODIPINE BESYLATE 5 MG: 5 TABLET ORAL at 08:27

## 2022-02-11 RX ADMIN — PANTOPRAZOLE SODIUM 40 MG: 40 TABLET, DELAYED RELEASE ORAL at 05:31

## 2022-02-11 RX ADMIN — SODIUM CHLORIDE, PRESERVATIVE FREE 10 ML: 5 INJECTION INTRAVENOUS at 05:36

## 2022-02-11 RX ADMIN — SODIUM CHLORIDE, PRESERVATIVE FREE 10 ML: 5 INJECTION INTRAVENOUS at 21:26

## 2022-02-11 RX ADMIN — LISINOPRIL AND HYDROCHLOROTHIAZIDE 1 TABLET: 12.5; 2 TABLET ORAL at 08:27

## 2022-02-11 RX ADMIN — Medication 1 AMPULE: at 08:28

## 2022-02-11 RX ADMIN — KETOROLAC TROMETHAMINE 15 MG: 15 INJECTION, SOLUTION INTRAMUSCULAR; INTRAVENOUS at 14:05

## 2022-02-11 RX ADMIN — CEFAZOLIN SODIUM 2 G: 100 INJECTION, POWDER, LYOPHILIZED, FOR SOLUTION INTRAVENOUS at 01:40

## 2022-02-11 RX ADMIN — KETOROLAC TROMETHAMINE 30 MG: 30 INJECTION, SOLUTION INTRAMUSCULAR; INTRAVENOUS at 09:05

## 2022-02-11 RX ADMIN — LEVOTHYROXINE SODIUM 50 MCG: 0.05 TABLET ORAL at 05:31

## 2022-02-11 RX ADMIN — PREGABALIN 225 MG: 150 CAPSULE ORAL at 21:26

## 2022-02-11 RX ADMIN — Medication 1 AMPULE: at 21:26

## 2022-02-11 RX ADMIN — ROSUVASTATIN CALCIUM 20 MG: 20 TABLET, COATED ORAL at 08:27

## 2022-02-11 RX ADMIN — CEFAZOLIN SODIUM 2 G: 100 INJECTION, POWDER, LYOPHILIZED, FOR SOLUTION INTRAVENOUS at 09:43

## 2022-02-11 RX ADMIN — EZETIMIBE 10 MG: 10 TABLET ORAL at 08:28

## 2022-02-11 RX ADMIN — HYDROMORPHONE HYDROCHLORIDE 1 MG: 1 INJECTION, SOLUTION INTRAMUSCULAR; INTRAVENOUS; SUBCUTANEOUS at 00:25

## 2022-02-11 RX ADMIN — DEXTROSE MONOHYDRATE, SODIUM CHLORIDE, AND POTASSIUM CHLORIDE 100 ML/HR: 50; 4.5; 1.49 INJECTION, SOLUTION INTRAVENOUS at 01:19

## 2022-02-11 RX ADMIN — DEXTROSE MONOHYDRATE, SODIUM CHLORIDE, AND POTASSIUM CHLORIDE 100 ML/HR: 50; 4.5; 1.49 INJECTION, SOLUTION INTRAVENOUS at 14:08

## 2022-02-11 RX ADMIN — KETOROLAC TROMETHAMINE 15 MG: 15 INJECTION, SOLUTION INTRAMUSCULAR; INTRAVENOUS at 21:26

## 2022-02-11 NOTE — PROGRESS NOTES
Problem: Falls - Risk of  Goal: *Absence of Falls  Description: Document Maynor Cintron Fall Risk and appropriate interventions in the flowsheet.   Outcome: Progressing Towards Goal  Note: Fall Risk Interventions:  Mobility Interventions: Bed/chair exit alarm,Communicate number of staff needed for ambulation/transfer         Medication Interventions: Bed/chair exit alarm,Patient to call before getting OOB    Elimination Interventions: Bed/chair exit alarm,Call light in reach

## 2022-02-11 NOTE — PROGRESS NOTES
ORTHO PROGRESS NOTE    2022    Admit Date: 2/10/2022  Admit Diagnosis: Lumbar stenosis with neurogenic claudication [M48.062]  Spinal stenosis [M48.00]  Post Op day: 1 Day Post-Op      Subjective:     Tiffanie Marie is a patient who is now 1 Day Post-Op  and has complaints Of severe uncontrolled pain. Patient has Dilaudid, hydrocodone and oxycodone ordered. He also has complaints of what he describes as burning with urination and even burning when he is not urinating. No Briseno is in place at current time. He states that he took his SCDs off because they were causing his legs to hurt more. But tells me his postoperative radicular radicular pain is better that it was prior to surgery. .       Objective:     PT/OT: To progress today. Vital Signs:    Patient Vitals for the past 8 hrs:   BP Temp Pulse Resp SpO2   22 0755 (!) 107/55 99.2 °F (37.3 °C) 62 14 97 %   22 0539 (!) 107/51       22 0320 (!) 102/48 99.4 °F (37.4 °C) 63 18 96 %     Temp (24hrs), Av.2 °F (36.8 °C), Min:97.2 °F (36.2 °C), Max:99.4 °F (37.4 °C)      LAB:    No results for input(s): HGB, WBC, PLT, HGBEXT, PLTEXT in the last 72 hours. I/O:  701 - 1900  In: -   Out: 175 [Urine:175]  1901 -  07  In: 1200 [I.V.:1200]  Out: 910 [Urine:400; Drains:260]    Physical Exam:    Awake and in no acute distress. Mood and affect appropriate. Respirations unlabored and no evidence cyanosis. Calves nontender. Abdomen soft and nontender. Dressing clean/dry  No new neurologic deficit.     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    Pain of right hip joint M25.551    Lumbar radicular pain E80.47    Systolic murmur K25.9    Elevated coronary artery calcium score R93.1    Enlarged thoracic aorta (HCC) I77.89    Essential hypertension I10    Peripheral polyneuropathy G62.9    Coronary artery disease due to calcified coronary lesion I25.10, I25.84    Spinal stenosis of lumbar region at multiple levels M48.061    Spinal stenosis M48.00       1 Day Post-Op STATUS POST Procedure(s):  L2-S1  LAMINECTOMY       Plan:     Continue PT/OT/Rehab  Discontinue:    Consult: none  Anticipate discharge to: HOME   Will check UA with culture and sensitivity. Will prophylactically start on Macrobid. Patient has quite a bit of narcotic ordered for pain. I would recommend we try 30 mg of Toradol IV now. We may give him a few more doses for pain relief.       Signed By: Alejandra Brown NP

## 2022-02-11 NOTE — OP NOTES
[facility]  OPERATIVE REPORT    Name:  Moncho Chang  MR#:  [id number]  :  1945  ACCOUNT #:  [de-identified]  DATE OF SERVICE:  02/10/2022    PREOPERATIVE DIAGNOSIS:  Bilateral L2, L3, L4, L5, S1 spinal stenosis. POSTOPERATIVE DIAGNOSIS:  Bilateral L2, L3, L4, L5, S1 spinal stenosis. PROCEDURE PERFORMED:  Bilateral L2, L3, L4, L5 and S1 bilateral laminectomy, partial facetectomy and foraminotomy, CPT code 28789, 63048 x4. SURGEON:  Jose G Castrejon MD    ASSISTANT:  Staff. ANESTHESIA:  GETA. COMPLICATIONS:  None. SPECIMENS REMOVED:  None. IMPLANTS:  None. ESTIMATED BLOOD LOSS:  125 mL    FLUIDS:  700 mL crystalloid    DRAINS:  One Hemovac. INDICATIONS:  The patient is a 79-year-old gentleman who is having back and leg pain, especially the left leg. He has had difficulty walking and standing. MRI scanning showed multilevel spinal stenosis and he only got temporary improvement with epidural injection, so he is here for surgical treatment. PROCEDURE:  The patient was brought to the operating room. After administration of anesthesia, IV antibiotic, and placement of monitoring lines, he was positioned prone on the Guzman frame with a sling. His back was prepped with Betadine and sterilely draped. At this point, surgeon called a time-out and confirmed the procedure to be performed, his allergy history, and the fact that he had received preoperative IV antibiotics. C-arm was brought in and we marked the L2-3 level, the L3-4 level, the L4-5 level, and L5-S1 on the skin. And then, we used a scalpel to make a midline incision over this area and we dissected down through the subcutaneous tissue with electrocautery to the deep fascia and then the deep fascia was incised on either side of the spinous processes and we dissected down along the lamina out the facet joint bilaterally. We cauterized the bleeders with the Aquamantys and the electrocautery.   We then placed an angled curette in what was felt to be the L4-5 interlaminar space, took a lateral C-arm x-ray to confirm our location. So initially, we removed the interspinous ligament except between L4-5 and removed the spinous processes of L2, L3, most of L4, most of L5, and S1, and then we burred down the lamina of all these with a high-speed negrita until it thinned out very significantly. Then we used a Kerrison to complete the laminectomies bilaterally and also undercut the facet joints, remove the ligamentum flavum, and perform foraminotomies. In the process of decompressing right up at the L4-5 level, we ended up sacrificing the posterior elements but we needed to do that to address the stenosis, and it was felt that at that point, we should just go ahead and address his L4-5 spinal stenosis which was milder in nature but it was there. So we decompressed this area with Kerrisons and so eventually we had done an L2 through S1 laminectomy and removed all the ligamentum flavum. There was a mild curve of the spine but it felt very stable because of degenerative changes in the spine. We irrigated multiple times, suctioned it dry. We used the Aquamantys on the soft tissue to obtain hemostasis and we placed FloSeal over the decompression site and applied some pressure. Then we removed the Ray-Dora over the decompression site. There was FloSeal but it was loosely in place. At this point, we were ready to close the wound. We placed a Hemovac drain deep in the wound and brought it out through a separate proximal stab incision. The subcutaneous tissue was reapproximated with interrupted stitches of 2-0 Vicryl and we did lay some vancomycin powder on top of the deep fascia before closing over it. The skin was closed with a running subcuticular stitch of 3-0 Vicryl. Dermabond Prineo was applied to the incision followed by dry sterile dressing.   The patient was then rolled supine onto the recovery room bed having tolerated the procedure well.       Lopez Lombardi MD      [id number]  D:  02/10/2022 10:27  T:  02/10/2022 20:31  JOB #:  [id number]

## 2022-02-11 NOTE — PROGRESS NOTES
PT note: Attempted to see patient for PM treatment and spoke with RN. RN requesting HOLD patient this PM due to increased pain. Will check back on patient at a later date/time as schedule allows and is appropriate.       Mckenzie Henry, JUAN PABLO

## 2022-02-11 NOTE — PROGRESS NOTES
ACUTE PHYSICAL THERAPY GOALS:  (Developed with and agreed upon by patient and/or caregiver.)  1. Pt will perform bed mobility (I) without cues in 7 therapy sessions. 2. Pt will perform all transfers (I) in 7 therapy sessions. 3. Pt will ambulate 750 ft under (S) with no LOB and breaks as needed in 7 therapy sessions. 4. Pt will perform standing balance activities with minimal postural sway in 7 therapy sessions. 5. Pt will tolerate multiple sets and reps of BLE exercises in 7 therapy sessions. PHYSICAL THERAPY: Daily Note and AM Treatment Day # 2    Emma Stone is a 68 y.o. male   PRIMARY DIAGNOSIS: Spinal stenosis of lumbar region at multiple levels  Lumbar stenosis with neurogenic claudication [M48.062]  Spinal stenosis [M48.00]  Procedure(s) (LRB):  L2-S1  LAMINECTOMY  (N/A)  1 Day Post-Op    ASSESSMENT:     REHAB RECOMMENDATIONS: CURRENT LEVEL OF FUNCTION:  (Most Recently Demonstrated)   Recommendation to date pending progress:  [address] [place] [address] pending progress  Equipment:    To Be Determined Bed Mobility:   Minimal Assistance  Sit to Stand:   Minimal Assistance  Transfers:   Minimal Assistance  Gait/Mobility:   Minimal Assistance     ASSESSMENT:  Mr. Isabella Gramajo is making slow progress toward goals. This AM he presented with dizziness and pain, limiting participation. Mobility throughout required min A and cueing for safety awareness. Ambulation in the room pushing IV pole due to unsteadiness and dizziness. Positioned for comfort in the chair and vitals checked, /56. Encouraged patient to sit up for 30 min before calling to go back to bed.       SUBJECTIVE:   Mr. Isabella Gramajo states, \"I'm dizzy\"    SOCIAL HISTORY/ LIVING ENVIRONMENT:   Home Environment: Private residence  One/Two Story Residence: Two story  Living Alone: No  Support Systems: Spouse/Significant Other  OBJECTIVE:     PAIN: VITAL SIGNS: LINES/DRAINS:   Pre Treatment: Pain Screen  Pain Scale 1: Numeric (0 - 10)  Pain Intensity 1: 5  Post Treatment: 4   Hemovac and IV  O2 Device: None (Room air)     MOBILITY: I Mod I S SBA CGA Min Mod Max Total  NT x2 Comments:   Bed Mobility    Rolling [] [] [] [] [] [x] [] [] [] [] []    Supine to Sit [] [] [] [] [] [x] [] [] [] [] []    Scooting [] [] [] [] [] [x] [] [] [] [] []    Sit to Supine [] [] [] [] [] [] [] [] [] [x] []    Transfers    Sit to Stand [] [] [] [] [] [x] [] [] [] [] []    Bed to Chair [] [] [] [] [] [x] [] [] [] [] []    Stand to Sit [] [] [] [] [] [x] [] [] [] [] []    I=Independent, Mod I=Modified Independent, S=Supervision, SBA=Standby Assistance, CGA=Contact Guard Assistance,   Min=Minimal Assistance, Mod=Moderate Assistance, Max=Maximal Assistance, Total=Total Assistance, NT=Not Tested    BALANCE: Good Fair+ Fair Fair- Poor NT Comments   Sitting Static [] [x] [] [] [] []    Sitting Dynamic [] [] [x] [] [] []              Standing Static [] [] [x] [x] [] []    Standing Dynamic [] [] [] [x] [] []      GAIT: I Mod I S SBA CGA Min Mod Max Total  NT x2 Comments:   Level of Assistance [] [] [] [] [] [x] [] [] [] [] []    Distance 15'    DME Rolling Walker    Gait Quality Unsteady, slow    Weightbearing  Status N/A     I=Independent, Mod I=Modified Independent, S=Supervision, SBA=Standby Assistance, CGA=Contact Guard Assistance,   Min=Minimal Assistance, Mod=Moderate Assistance, Max=Maximal Assistance, Total=Total Assistance, NT=Not Tested    PLAN:   FREQUENCY/DURATION: PT Plan of Care: BID for duration of hospital stay or until stated goals are met, whichever comes first.  TREATMENT:     TREATMENT:   (     )  Therapeutic Activity (26 Minutes): Therapeutic activity included Rolling, Supine to Sit, Scooting, Transfer Training, Ambulation on level ground, Sitting balance  and Standing balance to improve functional Mobility, Strength and Activity tolerance.     TREATMENT GRID:  N/A    AFTER TREATMENT POSITION/PRECAUTIONS:  Chair, Needs within reach and RN notified    INTERDISCIPLINARY COLLABORATION:  RN/PCT and PT/PTA    TOTAL TREATMENT DURATION:  PT Patient Time In/Time Out  Time In: 0940  Time Out: [address] PTA

## 2022-02-11 NOTE — PROGRESS NOTES
Problem: Falls - Risk of  Goal: *Absence of Falls  Description: Document Abner Surinder Fall Risk and appropriate interventions in the flowsheet.   Outcome: Progressing Towards Goal  Note: Fall Risk Interventions:  Mobility Interventions: Bed/chair exit alarm         Medication Interventions: Bed/chair exit alarm    Elimination Interventions: Call light in reach              Problem: Patient Education: Go to Patient Education Activity  Goal: Patient/Family Education  Outcome: Progressing Towards Goal

## 2022-02-11 NOTE — PROGRESS NOTES
Chart screened by  for potential discharge needs or concerns. PT eval complete with the recommendation for [facility] PT services. SW met with pt and spouse to discuss recommendation. Demographics. Insurance and PCP confirmed. Pt/spouse are in agreement with [facility] and expressed no preference of [facility] agency. Referral submitted to [facility]. No other dc needs or concerns identified or reported. Please notify/consult  if any discharge needs arise. Care Management Interventions  PCP Verified by CM: Yes  Last Visit to PCP: 08/13/21  Mode of Transport at Discharge:  Other (see comment) (spouse)  Transition of Care Consult (CM Consult): [address]: Yes  Discharge Durable Medical Equipment: No  Physical Therapy Consult: Yes  Occupational Therapy Consult: No  Speech Therapy Consult: No  Support Systems: Spouse/Significant Other  Confirm Follow Up Transport: Family  The Plan for Transition of Care is Related to the Following Treatment Goals : Home health PT & OT services to improve pt's strength and functional abilities s/p spine surgery  The Patient and/or Patient Representative was Provided with a Choice of Provider and Agrees with the Discharge Plan?: Yes  Freedom of Choice List was Provided with Basic Dialogue that Supports the Patient's Individualized Plan of Care/Goals, Treatment Preferences and Shares the Quality Data Associated with the Providers?: Yes  Discharge Location  Patient Expects to be Discharged to[de-identified] Home with home health [facility])

## 2022-02-12 PROCEDURE — 51798 US URINE CAPACITY MEASURE: CPT

## 2022-02-12 PROCEDURE — 74011250636 HC RX REV CODE- 250/636: Performed by: ORTHOPAEDIC SURGERY

## 2022-02-12 PROCEDURE — 74011250636 HC RX REV CODE- 250/636: Performed by: NURSE PRACTITIONER

## 2022-02-12 PROCEDURE — 2709999900 HC NON-CHARGEABLE SUPPLY

## 2022-02-12 PROCEDURE — 74011000250 HC RX REV CODE- 250: Performed by: ORTHOPAEDIC SURGERY

## 2022-02-12 PROCEDURE — 65270000029 HC RM PRIVATE

## 2022-02-12 PROCEDURE — 74011250637 HC RX REV CODE- 250/637: Performed by: ORTHOPAEDIC SURGERY

## 2022-02-12 PROCEDURE — 97530 THERAPEUTIC ACTIVITIES: CPT

## 2022-02-12 RX ADMIN — DOCUSATE SODIUM 100 MG: 100 CAPSULE, LIQUID FILLED ORAL at 08:36

## 2022-02-12 RX ADMIN — SODIUM CHLORIDE, PRESERVATIVE FREE 10 ML: 5 INJECTION INTRAVENOUS at 21:47

## 2022-02-12 RX ADMIN — PANTOPRAZOLE SODIUM 40 MG: 40 TABLET, DELAYED RELEASE ORAL at 06:24

## 2022-02-12 RX ADMIN — HYDROCODONE BITARTRATE AND ACETAMINOPHEN 1 TABLET: 10; 325 TABLET ORAL at 16:12

## 2022-02-12 RX ADMIN — SODIUM CHLORIDE, PRESERVATIVE FREE 10 ML: 5 INJECTION INTRAVENOUS at 06:24

## 2022-02-12 RX ADMIN — HYDROMORPHONE HYDROCHLORIDE 2 MG: 1 INJECTION, SOLUTION INTRAMUSCULAR; INTRAVENOUS; SUBCUTANEOUS at 17:22

## 2022-02-12 RX ADMIN — AMLODIPINE BESYLATE 5 MG: 5 TABLET ORAL at 08:36

## 2022-02-12 RX ADMIN — LISINOPRIL AND HYDROCHLOROTHIAZIDE 1 TABLET: 12.5; 2 TABLET ORAL at 08:36

## 2022-02-12 RX ADMIN — ROSUVASTATIN CALCIUM 20 MG: 20 TABLET, COATED ORAL at 08:36

## 2022-02-12 RX ADMIN — KETOROLAC TROMETHAMINE 15 MG: 15 INJECTION, SOLUTION INTRAMUSCULAR; INTRAVENOUS at 08:37

## 2022-02-12 RX ADMIN — EZETIMIBE 10 MG: 10 TABLET ORAL at 10:04

## 2022-02-12 RX ADMIN — Medication 1 AMPULE: at 21:47

## 2022-02-12 RX ADMIN — KETOROLAC TROMETHAMINE 15 MG: 15 INJECTION, SOLUTION INTRAMUSCULAR; INTRAVENOUS at 03:07

## 2022-02-12 RX ADMIN — HYDROCODONE BITARTRATE AND ACETAMINOPHEN 1 TABLET: 10; 325 TABLET ORAL at 21:50

## 2022-02-12 RX ADMIN — PREGABALIN 225 MG: 150 CAPSULE ORAL at 21:47

## 2022-02-12 RX ADMIN — HYDROMORPHONE HYDROCHLORIDE 2 MG: 1 INJECTION, SOLUTION INTRAMUSCULAR; INTRAVENOUS; SUBCUTANEOUS at 23:01

## 2022-02-12 RX ADMIN — HYDROMORPHONE HYDROCHLORIDE 2 MG: 1 INJECTION, SOLUTION INTRAMUSCULAR; INTRAVENOUS; SUBCUTANEOUS at 06:29

## 2022-02-12 RX ADMIN — Medication 1 AMPULE: at 08:37

## 2022-02-12 RX ADMIN — DEXTROSE MONOHYDRATE, SODIUM CHLORIDE, AND POTASSIUM CHLORIDE 100 ML/HR: 50; 4.5; 1.49 INJECTION, SOLUTION INTRAVENOUS at 01:05

## 2022-02-12 RX ADMIN — LEVOTHYROXINE SODIUM 50 MCG: 0.05 TABLET ORAL at 06:24

## 2022-02-12 RX ADMIN — SODIUM CHLORIDE, PRESERVATIVE FREE 10 ML: 5 INJECTION INTRAVENOUS at 13:59

## 2022-02-12 RX ADMIN — DEXTROSE MONOHYDRATE, SODIUM CHLORIDE, AND POTASSIUM CHLORIDE 100 ML/HR: 50; 4.5; 1.49 INJECTION, SOLUTION INTRAVENOUS at 16:28

## 2022-02-12 RX ADMIN — HYDROCODONE BITARTRATE AND ACETAMINOPHEN 1 TABLET: 10; 325 TABLET ORAL at 10:06

## 2022-02-12 RX ADMIN — HYDROMORPHONE HYDROCHLORIDE 2 MG: 1 INJECTION, SOLUTION INTRAMUSCULAR; INTRAVENOUS; SUBCUTANEOUS at 12:58

## 2022-02-12 RX ADMIN — HYDROMORPHONE HYDROCHLORIDE 2 MG: 1 INJECTION, SOLUTION INTRAMUSCULAR; INTRAVENOUS; SUBCUTANEOUS at 01:05

## 2022-02-12 NOTE — PROGRESS NOTES
ACUTE PHYSICAL THERAPY GOALS:  (Developed with and agreed upon by patient and/or caregiver.)  1. Pt will perform bed mobility (I) without cues in 7 therapy sessions. 2. Pt will perform all transfers (I) in 7 therapy sessions. 3. Pt will ambulate 750 ft under (S) with no LOB and breaks as needed in 7 therapy sessions. 4. Pt will perform standing balance activities with minimal postural sway in 7 therapy sessions. 5. Pt will tolerate multiple sets and reps of BLE exercises in 7 therapy sessions      PHYSICAL THERAPY: Daily Note and AM Treatment Day # 3    Sree Dumont is a 68 y.o. male   PRIMARY DIAGNOSIS: Spinal stenosis of lumbar region at multiple levels  Lumbar stenosis with neurogenic claudication [M48.062]  Spinal stenosis [M48.00]  Procedure(s) (LRB):  L2-S1  LAMINECTOMY  (N/A)  2 Days Post-Op    ASSESSMENT:     REHAB RECOMMENDATIONS: CURRENT LEVEL OF FUNCTION:  (Most Recently Demonstrated)   Recommendation to date pending progress:  [address] [place] [address]  Equipment:    To Be Determined Bed Mobility:   Minimal Assistance  Sit to Stand:  [employer] Assistance  Transfers:   Not tested  Gait/Mobility:   Not tested     ASSESSMENT:  Mr. Leticia Alvarez was supine and willing to try. He log rolled with minimal assist and became dizzy fairly quickly upon sitting then standing. He was quite anxious so lied back down. Once feeling a little better he sat up again and got BP. He became orthostatic again fairly quickly and had a very brief syncopal episode while lying down. Unable to mobilize due to orthostatic hypotension.   RN made aware of BP    BP lying after sitting first time: 97/56  BP lying 2nd time: 71/43     SUBJECTIVE:   Mr. Leticia Alvarez states, \"we cant do this today\"    SOCIAL HISTORY/ LIVING ENVIRONMENT:   Home Environment: Private residence  One/Two Story Residence: Two story  Living Alone: No  Support Systems: Spouse/Significant Other  OBJECTIVE:     PAIN: VITAL SIGNS: LINES/DRAINS: Pre Treatment: Pain Screen  Pain Scale 1: FLACC  Pain Intensity 1: 3  Post Treatment: 2   Continuous Pulse Oximetry  O2 Device: None (Room air)     MOBILITY: I Mod I S SBA CGA Min Mod Max Total  NT x2 Comments:   Bed Mobility    Rolling [] [] [] [] [] [x] [] [] [] [] []    Supine to Sit [] [] [] [] [] [x] [] [] [] [] []    Scooting [] [] [] [x] [] [] [] [] [] [] []    Sit to Supine [] [] [] [] [] [x] [] [] [] [] []    Transfers    Sit to Stand [] [] [] [] [x] [] [] [] [] [] []    Bed to Chair [] [] [] [] [] [] [] [] [] [] []    Stand to Sit [] [] [] [] [] [] [] [] [] [] []    I=Independent, Mod I=Modified Independent, S=Supervision, SBA=Standby Assistance, CGA=Contact Guard Assistance,   Min=Minimal Assistance, Mod=Moderate Assistance, Max=Maximal Assistance, Total=Total Assistance, NT=Not Tested    BALANCE: Good Fair+ Fair Fair- Poor NT Comments   Sitting Static [x] [] [] [] [] []    Sitting Dynamic [x] [] [] [] [] []              Standing Static [] [] [x] [] [] []    Standing Dynamic [] [] [] [] [] []      GAIT: I Mod I S SBA CGA Min Mod Max Total  NT x2 Comments:   Level of Assistance [] [] [] [] [] [] [] [] [] [] []    Distance NA    DME N/A    Gait Quality NA    Weightbearing  Status N/A     I=Independent, Mod I=Modified Independent, S=Supervision, SBA=Standby Assistance, CGA=Contact Guard Assistance,   Min=Minimal Assistance, Mod=Moderate Assistance, Max=Maximal Assistance, Total=Total Assistance, NT=Not Tested    PLAN:   FREQUENCY/DURATION: PT Plan of Care: BID for duration of hospital stay or until stated goals are met, whichever comes first.  TREATMENT:     TREATMENT:   ($$ Therapeutic Activity: 23-37 mins    )  Therapeutic Activity (25 Minutes): Therapeutic activity included Rolling, Supine to Sit, Sit to Supine, Transfer Training, Sitting balance  and Standing balance to improve functional Mobility, Strength and Activity tolerance.     TREATMENT GRID:  N/A    AFTER TREATMENT POSITION/PRECAUTIONS:  Bed, Needs within reach and RN notified    INTERDISCIPLINARY COLLABORATION:  RN/PCT and PT/PTA    TOTAL TREATMENT DURATION:  PT Patient Time In/Time Out  Time In: 1030  Time Out: [address], PTA

## 2022-02-12 NOTE — PROGRESS NOTES
Problem: Falls - Risk of  Goal: *Absence of Falls  Description: Document Treichlers Fall Risk and appropriate interventions in the flowsheet.   Outcome: Progressing Towards Goal  Note: Fall Risk Interventions:  Mobility Interventions: Bed/chair exit alarm         Medication Interventions: Bed/chair exit alarm    Elimination Interventions: Call light in reach              Problem: Patient Education: Go to Patient Education Activity  Goal: Patient/Family Education  Outcome: Progressing Towards Goal

## 2022-02-12 NOTE — PROGRESS NOTES
Problem: Falls - Risk of  Goal: *Absence of Falls  Description: Document Nasra Winters Fall Risk and appropriate interventions in the flowsheet.   Outcome: Progressing Towards Goal  Note: Fall Risk Interventions:  Mobility Interventions: Bed/chair exit alarm         Medication Interventions: Bed/chair exit alarm,Patient to call before getting OOB    Elimination Interventions: Call light in reach

## 2022-02-12 NOTE — PROGRESS NOTES
ORTHO PROGRESS NOTE    2022    Admit Date: 2/10/2022  Admit Diagnosis: Lumbar stenosis with neurogenic claudication [M48.062]  Spinal stenosis [M48.00]  Post Op day: 2 Day Post-Op      Subjective:     Sree Dumont is a patient who is now 2 Day Post-Op  and pain improving w/ Toradol. States he is much improved however still in pain. UA negative for UTI. He states his RLE is much better than before surgery. .       Objective:     PT/OT: To progress today. Vital Signs:    Patient Vitals for the past 8 hrs:   BP Temp Pulse Resp SpO2   22 0756 123/66 98.1 °F (36.7 °C)  17 97 %   22 0347 125/66 98.3 °F (36.8 °C) 68 18 96 %     Temp (24hrs), Av.9 °F (37.2 °C), Min:98.1 °F (36.7 °C), Max:99.6 °F (37.6 °C)      LAB:    No results for input(s): HGB, WBC, PLT, HGBEXT, PLTEXT, HGBEXT, PLTEXT in the last 72 hours. I/O:  No intake/output data recorded. 02/10 1901 -  0700  In: -   Out: 2323 [Urine:1225; Drains:270]    Physical Exam:    Awake and in no acute distress. Mood and affect appropriate. Respirations unlabored and no evidence cyanosis. Calves nontender. Abdomen soft and nontender. Dressing clean/dry  No new neurologic deficit. BLE: FHL/EHL/AT/GS w/ 5/5 strength. +silt in s/s/sp/dp/t. Back pain limites knee and hip strength.      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    Pain of right hip joint M25.551    Lumbar radicular pain Z37.30    Systolic murmur L46.6    Elevated coronary artery calcium score R93.1    Enlarged thoracic aorta (HCC) I77.89    Essential hypertension I10    Peripheral polyneuropathy G62.9    Coronary artery disease due to calcified coronary lesion I25.10, I25.84    Spinal stenosis of lumbar region at multiple levels M48.061    Spinal stenosis M48.00       Post-Op STATUS POST Procedure(s):  L2-S1  LAMINECTOMY       Plan:     Continue PT/OT/Rehab  Discontinue:    Consult: none  Anticipate discharge to: HOME   Toradol controlling pain better  He has only worked w/ PT one time and still is hurting. Once PT has cleared him, he may go home.        Signed By: Jr Harley MD

## 2022-02-13 LAB
BACTERIA SPEC CULT: NORMAL
HGB BLD-MCNC: 10.8 G/DL (ref 13.6–17.2)
SERVICE CMNT-IMP: NORMAL

## 2022-02-13 PROCEDURE — 74011250636 HC RX REV CODE- 250/636: Performed by: NURSE PRACTITIONER

## 2022-02-13 PROCEDURE — 97530 THERAPEUTIC ACTIVITIES: CPT

## 2022-02-13 PROCEDURE — 74011250636 HC RX REV CODE- 250/636: Performed by: ORTHOPAEDIC SURGERY

## 2022-02-13 PROCEDURE — 85018 HEMOGLOBIN: CPT

## 2022-02-13 PROCEDURE — 74011250637 HC RX REV CODE- 250/637: Performed by: NURSE PRACTITIONER

## 2022-02-13 PROCEDURE — 74011250637 HC RX REV CODE- 250/637: Performed by: ORTHOPAEDIC SURGERY

## 2022-02-13 PROCEDURE — 36415 COLL VENOUS BLD VENIPUNCTURE: CPT

## 2022-02-13 PROCEDURE — 51798 US URINE CAPACITY MEASURE: CPT

## 2022-02-13 PROCEDURE — 65270000029 HC RM PRIVATE

## 2022-02-13 PROCEDURE — 74011000250 HC RX REV CODE- 250: Performed by: ORTHOPAEDIC SURGERY

## 2022-02-13 PROCEDURE — 99222 1ST HOSP IP/OBS MODERATE 55: CPT | Performed by: UROLOGY

## 2022-02-13 RX ORDER — SODIUM CHLORIDE 9 MG/ML
125 INJECTION, SOLUTION INTRAVENOUS CONTINUOUS
Status: DISCONTINUED | OUTPATIENT
Start: 2022-02-13 | End: 2022-02-14 | Stop reason: HOSPADM

## 2022-02-13 RX ADMIN — DOCUSATE SODIUM 100 MG: 100 CAPSULE, LIQUID FILLED ORAL at 08:41

## 2022-02-13 RX ADMIN — SODIUM CHLORIDE, PRESERVATIVE FREE 10 ML: 5 INJECTION INTRAVENOUS at 14:44

## 2022-02-13 RX ADMIN — DEXTROSE MONOHYDRATE, SODIUM CHLORIDE, AND POTASSIUM CHLORIDE 100 ML/HR: 50; 4.5; 1.49 INJECTION, SOLUTION INTRAVENOUS at 05:44

## 2022-02-13 RX ADMIN — HYDROCODONE BITARTRATE AND ACETAMINOPHEN 1 TABLET: 10; 325 TABLET ORAL at 20:32

## 2022-02-13 RX ADMIN — SODIUM CHLORIDE 125 ML/HR: 900 INJECTION, SOLUTION INTRAVENOUS at 10:37

## 2022-02-13 RX ADMIN — DOCUSATE SODIUM 100 MG: 100 CAPSULE, LIQUID FILLED ORAL at 17:11

## 2022-02-13 RX ADMIN — EZETIMIBE 10 MG: 10 TABLET ORAL at 08:41

## 2022-02-13 RX ADMIN — Medication 1 AMPULE: at 20:32

## 2022-02-13 RX ADMIN — HYDROMORPHONE HYDROCHLORIDE 2 MG: 1 INJECTION, SOLUTION INTRAMUSCULAR; INTRAVENOUS; SUBCUTANEOUS at 16:24

## 2022-02-13 RX ADMIN — LEVOTHYROXINE SODIUM 50 MCG: 0.05 TABLET ORAL at 05:43

## 2022-02-13 RX ADMIN — OXYCODONE 10 MG: 5 TABLET ORAL at 00:56

## 2022-02-13 RX ADMIN — ROSUVASTATIN CALCIUM 20 MG: 20 TABLET, COATED ORAL at 08:41

## 2022-02-13 RX ADMIN — SODIUM CHLORIDE, PRESERVATIVE FREE 10 ML: 5 INJECTION INTRAVENOUS at 05:43

## 2022-02-13 RX ADMIN — HYDROMORPHONE HYDROCHLORIDE 2 MG: 1 INJECTION, SOLUTION INTRAMUSCULAR; INTRAVENOUS; SUBCUTANEOUS at 05:43

## 2022-02-13 RX ADMIN — PREGABALIN 225 MG: 150 CAPSULE ORAL at 20:32

## 2022-02-13 RX ADMIN — SODIUM CHLORIDE 125 ML/HR: 900 INJECTION, SOLUTION INTRAVENOUS at 18:56

## 2022-02-13 RX ADMIN — PANTOPRAZOLE SODIUM 40 MG: 40 TABLET, DELAYED RELEASE ORAL at 05:43

## 2022-02-13 RX ADMIN — ACETAMINOPHEN 1000 MG: 500 TABLET, FILM COATED ORAL at 08:38

## 2022-02-13 RX ADMIN — AMLODIPINE BESYLATE 5 MG: 5 TABLET ORAL at 08:42

## 2022-02-13 RX ADMIN — LISINOPRIL AND HYDROCHLOROTHIAZIDE 1 TABLET: 12.5; 2 TABLET ORAL at 08:42

## 2022-02-13 RX ADMIN — Medication 1 AMPULE: at 08:38

## 2022-02-13 RX ADMIN — SODIUM CHLORIDE, PRESERVATIVE FREE 10 ML: 5 INJECTION INTRAVENOUS at 22:42

## 2022-02-13 NOTE — PROGRESS NOTES
ACUTE PHYSICAL THERAPY GOALS:  (Developed with and agreed upon by patient and/or caregiver.)  1. Pt will perform bed mobility (I) without cues in 7 therapy sessions. 2. Pt will perform all transfers (I) in 7 therapy sessions. 3. Pt will ambulate 750 ft under (S) with no LOB and breaks as needed in 7 therapy sessions. 4. Pt will perform standing balance activities with minimal postural sway in 7 therapy sessions. 5. Pt will tolerate multiple sets and reps of BLE exercises in 7 therapy sessions      PHYSICAL THERAPY: Daily Note and AM Treatment Day # 4    Bravo Del Toro is a 68 y.o. male   PRIMARY DIAGNOSIS: Spinal stenosis of lumbar region at multiple levels  Lumbar stenosis with neurogenic claudication [M48.062]  Spinal stenosis [M48.00]  Procedure(s) (LRB):  L2-S1  LAMINECTOMY  (N/A)  3 Days Post-Op    ASSESSMENT:     REHAB RECOMMENDATIONS: CURRENT LEVEL OF FUNCTION:  (Most Recently Demonstrated)   Recommendation to date pending progress:  [address] [place] [address]  Equipment:    To Be Determined Bed Mobility:   Minimal Assistance  Sit to Stand:   Contact Guard Assistance  Transfers:   Contact Guard Assistance  Gait/Mobility:   Contact Guard Assistance     ASSESSMENT:  Mr. Otf Blankenship was supine and willing to try. He log rolled with minimal assist sat up, stood up and walked around the bed all with min to CGA. He continues to be orthostatic with BP below. Had him sit up in the recliner for awhile in hopes he would acclimate to being up but no improvement. BP results all below. RN and MD all aware.     Supine: 166/73 & 172/73  Sittin/88 & 156/69  Standin/50 & 103/53    After sitting in recliner 30 minutes    Sitting : 112/73 & 100/63  Standin/51       SUBJECTIVE:   Mr. Otf Blankenship states, \"lets try\"    SOCIAL HISTORY/ LIVING ENVIRONMENT:   Home Environment: Private residence  One/Two Story Residence: Two story  Living Alone: No  Support Systems: Spouse/Significant Other  OBJECTIVE: PAIN: VITAL SIGNS: LINES/DRAINS:   Pre Treatment:    Post Treatment: 2 Vital Signs  More BP/Pulse rows needed?: Yes Briseno Catheter  O2 Device: None (Room air)     MOBILITY: I Mod I S SBA CGA Min Mod Max Total  NT x2 Comments:   Bed Mobility    Rolling [] [] [] [] [] [x] [] [] [] [] []    Supine to Sit [] [] [] [] [] [x] [] [] [] [] []    Scooting [] [] [] [x] [] [] [] [] [] [] []    Sit to Supine [] [] [] [] [] [x] [] [] [] [] []    Transfers    Sit to Stand [] [] [] [] [x] [] [] [] [] [] []    Bed to Chair [] [] [] [] [] [] [] [] [] [] []    Stand to Sit [] [] [] [] [] [] [] [] [] [] []    I=Independent, Mod I=Modified Independent, S=Supervision, SBA=Standby Assistance, CGA=Contact Guard Assistance,   Min=Minimal Assistance, Mod=Moderate Assistance, Max=Maximal Assistance, Total=Total Assistance, NT=Not Tested    BALANCE: Good Fair+ Fair Fair- Poor NT Comments   Sitting Static [x] [] [] [] [] []    Sitting Dynamic [x] [] [] [] [] []              Standing Static [] [x] [x] [] [] []    Standing Dynamic [] [] [x] [] [] []      GAIT: I Mod I S SBA CGA Min Mod Max Total  NT x2 Comments:   Level of Assistance [] [] [] [] [x] [] [] [] [] [] []    Distance 10    DME Rolling Walker    Gait Quality Little shaky    Weightbearing  Status N/A     I=Independent, Mod I=Modified Independent, S=Supervision, SBA=Standby Assistance, CGA=Contact Guard Assistance,   Min=Minimal Assistance, Mod=Moderate Assistance, Max=Maximal Assistance, Total=Total Assistance, NT=Not Tested    PLAN:   FREQUENCY/DURATION: PT Plan of Care: BID for duration of hospital stay or until stated goals are met, whichever comes first.  TREATMENT:     TREATMENT:   ($$ Therapeutic Activity: 8-22 mins    )  Therapeutic Activity (25 Minutes): Therapeutic activity included Rolling, Supine to Sit, Sit to Supine, Transfer Training, Sitting balance  and Standing balance to improve functional Mobility, Strength and Activity tolerance.     TREATMENT GRID:  N/A    AFTER TREATMENT POSITION/PRECAUTIONS:  Bed, Needs within reach and RN notified    INTERDISCIPLINARY COLLABORATION:  RN/PCT and PT/PTA    TOTAL TREATMENT DURATION:  PT Patient Time In/Time Out  Time In: 0900 (1000)  Time Out: 0920 (1010)    Vero Phillips, PTA

## 2022-02-13 NOTE — PROGRESS NOTES
CONSULT    Subjective:      Emma Stone is a 68 y.o. male who underwent L2-S1 laminectomy by Dr. Ariel Guzman 2/10/22. He has has post op issues with urinary retention and had undergone in/out cath twice prior to urethral catheter being left in place last night. He relates moderate LUTS prior to surgery including slow stream and intermittent dribbling. He has been on no prostate medications. Patient can think of no other instigating or exacerbating events.       Past Medical History:   Diagnosis Date    Axonal polyneuropathy 2018    B12 deficiency 2019    BPH (benign prostatic hyperplasia) 2012    CAD (coronary artery disease) 2020    Ca score 65    Diverticulitis     Family history of diabetes mellitus     GERD (gastroesophageal reflux disease)     medication    HDL deficiency 2012    Hemorrhoid     int and ext    Hypertension 2012    medication    Hypokalemia     Hypothyroid     medication    Leukocytosis     Lumbar radicular pain 2019    Murmur, cardiac     echo 20  EF 55-60%    Pain of right hip joint 2019    Paresthesia of right foot 2018    Prediabetes     diet control and weight loss  21 A1c 6.4    Right leg pain 2018     Past Surgical History:   Procedure Laterality Date    HX COLONOSCOPY      HX ENDOSCOPY      HX ORTHOPAEDIC      right leg surgery to remove wire at age 12      Family History   Problem Relation Age of Onset    Diabetes Mother     Cancer Sister         Had Leukemia    Cancer Brother 72        lung cancer/lung disease    Cancer Sister 76        breast cancer    Lung Disease Brother         COPD     Social History     Tobacco Use    Smoking status: Former Smoker     Packs/day: 0.00     Years: 30.00     Pack years: 0.00     Quit date:      Years since quittin.1    Smokeless tobacco: Never Used   Substance Use Topics    Alcohol use: No     No Known Allergies  Prior to Admission medications    Medication Sig Start Date End Date Taking? Authorizing Provider   HYDROcodone-acetaminophen (NORCO)  mg tablet Take 1 Tablet by mouth every six (6) hours as needed for Pain for up to 7 days. Max Daily Amount: 4 Tablets. 2/10/22 2/17/22 Yes Marine Moran MD   acetaminophen (Tylenol Extra Strength) 500 mg tablet Take 1,000 mg by mouth every six (6) hours as needed for Pain. Yes Provider, Historical   pregabalin (LYRICA) 225 mg capsule Take 1 Capsule by mouth two (2) times a day. Max Daily Amount: 450 mg. Patient taking differently: Take 225 mg by mouth nightly. 11/10/21  Yes Diantha Paget, MD   rosuvastatin (CRESTOR) 20 mg tablet Take 1 tablet by mouth nightly  Patient taking differently: Take 20 mg by mouth daily. 11/4/21  Yes Tray Correa MD   pregabalin (LYRICA) 75 mg capsule Take 1-3 capsules by mouth QHS. 9/29/21  Yes Diantha Paget, MD   lisinopril-hydroCHLOROthiazide (Zestoretic) 20-12.5 mg per tablet Take 1 Tablet by mouth daily. 8/13/21  Yes Tray Correa MD   levothyroxine (SYNTHROID) 50 mcg tablet Take 1 Tablet by mouth Daily (before breakfast). 8/13/21  Yes Tray Correa MD   amLODIPine (NORVASC) 5 mg tablet Take 1 Tablet by mouth daily. 8/13/21  Yes Tray Correa MD   potassium chloride (K-DUR, KLOR-CON) 20 mEq tablet Take 1 Tablet by mouth daily. 8/13/21  Yes Tray Correa MD   ezetimibe (ZETIA) 10 mg tablet Take 1 Tablet by mouth daily. 8/13/21  Yes Tray Correa MD   omeprazole (PRILOSEC) 40 mg capsule Take 1 Capsule by mouth daily. 30 minutes prior to the largest meal of the day. 8/13/21  Yes Tray Correa MD   aspirin delayed-release 81 mg tablet Take 1 Tab by mouth daily. 11/12/20  Yes Tray Correa MD   multivitamin (ONE A DAY) tablet Take 1 Tab by mouth daily. Yes Provider, Historical   cyanocobalamin 1,000 mcg tablet Take 1,000 mcg by mouth daily. Yes Provider, Historical        Review of Systems:  Pertinent items are noted in HPI.      Objective: Patient Vitals for the past 8 hrs:   BP Temp Pulse Resp SpO2   22 0823 123/73 99.1 °F (37.3 °C) 83  97 %   22 0346 (!) 123/57 98.7 °F (37.1 °C) 77 16 96 %       Temp (24hrs), Av.7 °F (37.1 °C), Min:98.4 °F (36.9 °C), Max:99.1 °F (37.3 °C)      Physical Exam:  GENERAL: alert, cooperative, no distress, appears stated age, abd soft, 16 fr urethral catheter in place draining clear yellow urine. No results found for this or any previous visit (from the past 24 hour(s)). Assessment:     Urinary retention    Recent back surgery    Pre surgery LUTS, likely BPH      Plan:     The likely multifactorial cause of urinary retention was discussed. We discussed that most (but not all) men will be able to void with prostate mediction(s) and time. Urethral catheter will be left IN. He will be seen in our office towards the end of this week, likely for trial of void. He will begin tamsulosin 0.4 mg  qhs now and needs to be discharged upon it. Call if questions.

## 2022-02-13 NOTE — PROGRESS NOTES
Problem: Falls - Risk of  Goal: *Absence of Falls  Description: Document Aliya Lazo Fall Risk and appropriate interventions in the flowsheet.   Outcome: Progressing Towards Goal  Note: Fall Risk Interventions:  Mobility Interventions: Bed/chair exit alarm,Patient to call before getting OOB         Medication Interventions: Patient to call before getting OOB    Elimination Interventions: Call light in reach

## 2022-02-13 NOTE — PROGRESS NOTES
ACUTE PHYSICAL THERAPY GOALS:  (Developed with and agreed upon by patient and/or caregiver.)  1. Pt will perform bed mobility (I) without cues in 7 therapy sessions. 2. Pt will perform all transfers (I) in 7 therapy sessions. 3. Pt will ambulate 750 ft under (S) with no LOB and breaks as needed in 7 therapy sessions. 4. Pt will perform standing balance activities with minimal postural sway in 7 therapy sessions. 5. Pt will tolerate multiple sets and reps of BLE exercises in 7 therapy sessions      PHYSICAL THERAPY: Daily Note and PM Treatment Day # 4    Margart Bumpers is a 68 y.o. male   PRIMARY DIAGNOSIS: Spinal stenosis of lumbar region at multiple levels  Lumbar stenosis with neurogenic claudication [M48.062]  Spinal stenosis [M48.00]  Procedure(s) (LRB):  L2-S1  LAMINECTOMY  (N/A)  3 Days Post-Op    ASSESSMENT:     REHAB RECOMMENDATIONS: CURRENT LEVEL OF FUNCTION:  (Most Recently Demonstrated)   Recommendation to date pending progress:  [address] [place] [address]  Equipment:    To Be Determined Bed Mobility:   Minimal Assistance  Sit to Stand:   Contact Guard Assistance  Transfers:   Contact Guard Assistance  Gait/Mobility:   Contact Guard Assistance     ASSESSMENT:  Mr. Velma Levin was supine and willing to try. He log rolled with minimal assist sat up, stood up and walked around the bed all with min to CGA. He continues to be orthostatic with BP below. Had him sit up in the recliner for awhile in hopes he would acclimate to being up but no improvement. BP results all below. RN and MD all aware. Supine: 166/73 & 172/73  Sittin/88 & 156/69  Standin/50 & 103/53  After sitting in recliner 30 minutes  Sitting : 112/73 & 100/63  Standin/51    PM:  Patient was able to walk this afternoon after BP's were much better although still with an initial drop upon standing.   He was able to walk 250 ft with use of the walker with one seated rest break along the way (/68)  He seems generally weak due to extra days in the bed but moving well and motivated to go home.        SUBJECTIVE:   Mr. Isabella Gramajo states, [place] is the only way I'm going to go home\"    SOCIAL HISTORY/ LIVING ENVIRONMENT:   Home Environment: Private residence  One/Two Story Residence: Two story  Living Alone: No  Support Systems: Spouse/Significant Other  OBJECTIVE:     PAIN: VITAL SIGNS: LINES/DRAINS:   Pre Treatment:    Post Treatment: 2 Vital Signs  BP: 130/69  MAP (Calculated): 89  BP 1 Location: Right upper arm  BP Patient Position: Reclining  More BP/Pulse rows needed?: Yes  Additional Blood Pressure/Pulse Data  BP 2: 151/66  MAP 2 (Calculated): 94  BP 2 Location: Right arm  Patient Position 2: Sitting  BP 3: 125/69  MAP 3 (Calculated): 88  BP 3 Location: Right arm  Patient Position 3: Standing Briseno Catheter and IV  O2 Device: None (Room air)     MOBILITY: I Mod I S SBA CGA Min Mod Max Total  NT x2 Comments:   Bed Mobility    Rolling [] [] [] [] [] [x] [] [] [] [] []    Supine to Sit [] [] [] [] [x] [x] [] [] [] [] []    Scooting [] [] [] [x] [] [] [] [] [] [] []    Sit to Supine [] [] [] [] [] [x] [] [] [] [] []    Transfers    Sit to Stand [] [] [] [] [x] [] [] [] [] [] []    Bed to Chair [] [] [] [] [] [] [] [] [] [] []    Stand to Sit [] [] [] [] [] [] [] [] [] [] []    I=Independent, Mod I=Modified Independent, S=Supervision, SBA=Standby Assistance, CGA=Contact Guard Assistance,   Min=Minimal Assistance, Mod=Moderate Assistance, Max=Maximal Assistance, Total=Total Assistance, NT=Not Tested    BALANCE: Good Fair+ Fair Fair- Poor NT Comments   Sitting Static [x] [] [] [] [] []    Sitting Dynamic [x] [] [] [] [] []              Standing Static [] [x] [x] [] [] []    Standing Dynamic [] [] [x] [] [] []      GAIT: I Mod I S SBA CGA Min Mod Max Total  NT x2 Comments:   Level of Assistance [] [] [] [] [x] [] [] [] [] [] []    Distance 250    DME Rolling Walker    Gait Quality Little shaky    Weightbearing  Status N/A I=Independent, Mod I=Modified Independent, S=Supervision, SBA=Standby Assistance, CGA=Contact Guard Assistance,   Min=Minimal Assistance, Mod=Moderate Assistance, Max=Maximal Assistance, Total=Total Assistance, NT=Not Tested    PLAN:   FREQUENCY/DURATION: PT Plan of Care: BID for duration of hospital stay or until stated goals are met, whichever comes first.  TREATMENT:     TREATMENT:   ($$ Therapeutic Activity: 23-37 mins    )  Therapeutic Activity (25 Minutes): Therapeutic activity included Rolling, Supine to Sit, Sit to Supine, Transfer Training, Sitting balance  and Standing balance to improve functional Mobility, Strength and Activity tolerance.     TREATMENT GRID:  N/A    AFTER TREATMENT POSITION/PRECAUTIONS:  Chair, Needs within reach, RN notified and Visitors at bedside    INTERDISCIPLINARY COLLABORATION:  RN/PCT and PT/PTA    TOTAL TREATMENT DURATION:  PT Patient Time In/Time Out  Time In: 1330  Time Out: Patricia 238, PTA

## 2022-02-13 NOTE — PROGRESS NOTES
ORTHO PROGRESS NOTE    2022    Admit Date: 2/10/2022  Admit Diagnosis: Lumbar stenosis with neurogenic claudication [M48.062]  Spinal stenosis [M48.00]  Post Op day: 3 Day Post-Op      Subjective:     Kris Hull is a patient who is now 3 Day Post-Op  and pain improving w/ Toradol. Having Orthostatic hypotension and syncopal episodes when trying to work w/ PT yesterday and again today. Unable to sit up right at all due to syncopal episode yesterday. Urinary retention so kidd placed. NO PT eval completed yet. Objective:     PT/OT: To progress today. Vital Signs:    Patient Vitals for the past 8 hrs:   BP Temp Pulse Resp SpO2   22 0823 123/73 99.1 °F (37.3 °C) 83  97 %   22 0346 (!) 123/57 98.7 °F (37.1 °C) 77 16 96 %     Temp (24hrs), Av.7 °F (37.1 °C), Min:98.4 °F (36.9 °C), Max:99.1 °F (37.3 °C)      LAB:    No results for input(s): HGB, WBC, PLT, HGBEXT, PLTEXT, HGBEXT, PLTEXT in the last 72 hours. I/O:  No intake/output data recorded.  1901 -  0700  In: 240 [P.O.:240]  Out: 3960 [Urine:3850; Drains:110]    Physical Exam:    Awake and in no acute distress. Mood and affect appropriate. Respirations unlabored and no evidence cyanosis. Calves nontender. Abdomen soft and nontender. Dressing clean/dry  No new neurologic deficit. BLE: FHL/EHL/AT/GS w/ 5/5 strength. +silt in s/s/sp/dp/t. Back pain limites knee and hip strength.      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    Pain of right hip joint M25.551    Lumbar radicular pain H63.24    Systolic murmur R96.9    Elevated coronary artery calcium score R93.1    Enlarged thoracic aorta (HCC) I77.89    Essential hypertension I10    Peripheral polyneuropathy G62.9    Coronary artery disease due to calcified coronary lesion I25.10, I25.84    Spinal stenosis of lumbar region at multiple levels M48.061    Spinal stenosis M48.00       Post-Op STATUS POST Procedure(s):  L2-S1  LAMINECTOMY       Plan:     Continue PT/OT/Rehab  Discontinue:    Consult: none  Anticipate discharge to: HOME   Toradol controlling pain better     still is hurting and having orthostatic hypotension. Increased IVF to NS 125cc/hr continuous to aid in orthostatic hypotension. He had one more episode this morning    Recs per urology for catheter: Urethral catheter will be left IN. He will be seen in our office towards the end of this week, likely for trial of void. He will begin tamsulosin 0.4 mg  qhs now and needs to be discharged upon it. Once PT has cleared him, he may go home.        Signed By: Dylan Oliver MD

## 2022-02-14 VITALS
DIASTOLIC BLOOD PRESSURE: 75 MMHG | OXYGEN SATURATION: 97 % | TEMPERATURE: 98.1 F | SYSTOLIC BLOOD PRESSURE: 136 MMHG | HEART RATE: 73 BPM | RESPIRATION RATE: 17 BRPM

## 2022-02-14 PROCEDURE — 74011250637 HC RX REV CODE- 250/637: Performed by: ORTHOPAEDIC SURGERY

## 2022-02-14 PROCEDURE — 97530 THERAPEUTIC ACTIVITIES: CPT

## 2022-02-14 PROCEDURE — 74011000250 HC RX REV CODE- 250: Performed by: ORTHOPAEDIC SURGERY

## 2022-02-14 RX ADMIN — AMLODIPINE BESYLATE 5 MG: 5 TABLET ORAL at 08:41

## 2022-02-14 RX ADMIN — SODIUM CHLORIDE, PRESERVATIVE FREE 10 ML: 5 INJECTION INTRAVENOUS at 05:56

## 2022-02-14 RX ADMIN — PANTOPRAZOLE SODIUM 40 MG: 40 TABLET, DELAYED RELEASE ORAL at 08:41

## 2022-02-14 RX ADMIN — LISINOPRIL AND HYDROCHLOROTHIAZIDE 1 TABLET: 12.5; 2 TABLET ORAL at 08:40

## 2022-02-14 RX ADMIN — ROSUVASTATIN CALCIUM 20 MG: 20 TABLET, COATED ORAL at 08:40

## 2022-02-14 RX ADMIN — EZETIMIBE 10 MG: 10 TABLET ORAL at 08:39

## 2022-02-14 RX ADMIN — POTASSIUM CHLORIDE 20 MEQ: 20 TABLET, EXTENDED RELEASE ORAL at 08:41

## 2022-02-14 RX ADMIN — Medication 1 AMPULE: at 08:42

## 2022-02-14 RX ADMIN — ACETAMINOPHEN 1000 MG: 500 TABLET, FILM COATED ORAL at 03:51

## 2022-02-14 RX ADMIN — ACETAMINOPHEN 1000 MG: 500 TABLET, FILM COATED ORAL at 14:00

## 2022-02-14 RX ADMIN — SODIUM CHLORIDE, PRESERVATIVE FREE 10 ML: 5 INJECTION INTRAVENOUS at 14:33

## 2022-02-14 RX ADMIN — LEVOTHYROXINE SODIUM 50 MCG: 0.05 TABLET ORAL at 08:40

## 2022-02-14 NOTE — PROGRESS NOTES
ACUTE PHYSICAL THERAPY GOALS:  (Developed with and agreed upon by patient and/or caregiver.)  1. Pt will perform bed mobility (I) without cues in 7 therapy sessions. GOAL MET 2022  2.   3. Pt will perform all transfers (I) in 7 therapy sessions. 4. Pt will ambulate 750 ft under (S) with no LOB and breaks as needed in 7 therapy sessions. 5. Pt will perform standing balance activities with minimal postural sway in 7 therapy sessions. 6. Pt will tolerate multiple sets and reps of BLE exercises in 7 therapy sessions      PHYSICAL THERAPY: Daily Note and PM Treatment Day # 5    Sagar Gomez is a 68 y.o. male   PRIMARY DIAGNOSIS: Spinal stenosis of lumbar region at multiple levels  Lumbar stenosis with neurogenic claudication [M48.062]  Spinal stenosis [M48.00]  Procedure(s) (LRB):  L2-S1  LAMINECTOMY  (N/A)  4 Days Post-Op    ASSESSMENT:     REHAB RECOMMENDATIONS: CURRENT LEVEL OF FUNCTION:  (Most Recently Demonstrated)   Recommendation to date pending progress:  [address] [place] [address]  Equipment:    None Bed Mobility:   Modified Independent  Sit to Stand:  [place] Assistance  Transfers:   Standby Assistance  Gait/Mobility:   Standby Assistance     ASSESSMENT:  Mr. Kain Posadas was supine and eager to walk. He rolled from a flat bed and sat upwithout help. Orthostatics taken at every position. He walked one lap with light use of the walker then did another lap without AD all with great progress and no safety issues. Great progress. PM:  Patient log rolled from flat bed with no rails without help. He walked the [place] followed by going up and down 2 flights of stairs with one railing. Great progress and long talk with patient and wife about general mobility expectations once home.        SUBJECTIVE:   Mr. Kain Posadas states, \"I am ready\"    SOCIAL HISTORY/ LIVING ENVIRONMENT:   Home Environment: Private residence  One/Two Story Residence: Two story  Living Alone: No  Support Systems: Spouse/Significant Other  OBJECTIVE:     PAIN: VITAL SIGNS: LINES/DRAINS:   Pre Treatment: Pain Screen  Pain Scale 1: FLACC  Pain Intensity 1: 2  Post Treatment: 2 Vital Signs  BP: 137/62  MAP (Calculated): 87  BP 1 Location: Right upper arm  BP Patient Position: Supine  Additional Blood Pressure/Pulse Data  BP 2: 138/67  MAP 2 (Calculated): 91  BP 2 Location: Right arm  Patient Position 2: Sitting  BP 3: 139/70  MAP 3 (Calculated): 93  Patient Position 3: Standing Briseno Catheter  O2 Device: None (Room air)     MOBILITY: I Mod I S SBA CGA Min Mod Max Total  NT x2 Comments:   Bed Mobility    Rolling [] [x] [] [] [] [] [] [] [] [] []    Supine to Sit [] [x] [] [] [] [] [] [] [] [] []    Scooting [] [x] [] [] [] [] [] [] [] [] []    Sit to Supine [] [] [] [] [] [] [] [] [] [] []    Transfers    Sit to Stand [] [] [] [x] [] [] [] [] [] [] []    Bed to Chair [] [] [] [x] [] [] [] [] [] [] []    Stand to Sit [] [] [x] [] [] [] [] [] [] [] []    I=Independent, Mod I=Modified Independent, S=Supervision, SBA=Standby Assistance, CGA=Contact Guard Assistance,   Min=Minimal Assistance, Mod=Moderate Assistance, Max=Maximal Assistance, Total=Total Assistance, NT=Not Tested    BALANCE: Good Fair+ Fair Fair- Poor NT Comments   Sitting Static [x] [] [] [] [] []    Sitting Dynamic [x] [] [] [] [] []              Standing Static [] [x] [] [] [] []    Standing Dynamic [] [x] [] [] [] []      GAIT: I Mod I S SBA CGA Min Mod Max Total  NT x2 Comments:   Level of Assistance [] [] [] [x] [] [] [] [] [] [] [] Up and down 2 flights of stairs   Distance 250    DME None     Gait Quality good    Weightbearing  Status N/A     I=Independent, Mod I=Modified Independent, S=Supervision, SBA=Standby Assistance, CGA=Contact Guard Assistance,   Min=Minimal Assistance, Mod=Moderate Assistance, Max=Maximal Assistance, Total=Total Assistance, NT=Not Tested    PLAN:   FREQUENCY/DURATION: PT Plan of Care: BID for duration of hospital stay or until stated goals are met, whichever comes first.  TREATMENT:     TREATMENT:   ($$ Therapeutic Activity: 8-22 mins    )  Therapeutic Activity (15 Minutes): Therapeutic activity included Rolling, Supine to Sit, Sit to Supine, Transfer Training, Ambulation on level ground, Standing balance and stair training to improve functional Mobility, Strength and Activity tolerance.     TREATMENT GRID:  N/A    AFTER TREATMENT POSITION/PRECAUTIONS:  Chair, Needs within reach, RN notified and Visitors at bedside    INTERDISCIPLINARY COLLABORATION:  RN/PCT and PT/PTA    TOTAL TREATMENT DURATION:  PT Patient Time In/Time Out  Time In: 1445  Time Out: [address], PTA

## 2022-02-14 NOTE — PROGRESS NOTES
POD 4    Hgb - 10.8  UA - negative    AF,VSS  No complaints today as able to walk without orthostatic hypotension, feeling much better  Dressing okay  Neuro intact  Will remove kidd and make sure patient able to void and empty bladder and then discharge to home later today

## 2022-02-14 NOTE — PROGRESS NOTES
ACUTE PHYSICAL THERAPY GOALS:  (Developed with and agreed upon by patient and/or caregiver.)  1. Pt will perform bed mobility (I) without cues in 7 therapy sessions. GOAL MET 2022  2.   3. Pt will perform all transfers (I) in 7 therapy sessions. 4. Pt will ambulate 750 ft under (S) with no LOB and breaks as needed in 7 therapy sessions. 5. Pt will perform standing balance activities with minimal postural sway in 7 therapy sessions. 6. Pt will tolerate multiple sets and reps of BLE exercises in 7 therapy sessions      PHYSICAL THERAPY: Daily Note and AM Treatment Day # 5    Emma Stone is a 68 y.o. male   PRIMARY DIAGNOSIS: Spinal stenosis of lumbar region at multiple levels  Lumbar stenosis with neurogenic claudication [M48.062]  Spinal stenosis [M48.00]  Procedure(s) (LRB):  L2-S1  LAMINECTOMY  (N/A)  4 Days Post-Op    ASSESSMENT:     REHAB RECOMMENDATIONS: CURRENT LEVEL OF FUNCTION:  (Most Recently Demonstrated)   Recommendation to date pending progress:  [address] [place] [address]  Equipment:    None Bed Mobility:   Modified Independent  Sit to Stand:  [employer] Assistance  Transfers:   Standby Assistance  Gait/Mobility:   Standby Assistance     ASSESSMENT:  Mr. Kennedy Baig was supine and eager to walk. He rolled from a flat bed and sat upwithout help. Orthostatics taken at every position. He walked one lap with light use of the walker then did another lap without AD all with great progress and no safety issues. Great progress.        SUBJECTIVE:   Mr. Kennedy Baig states, \"I am ready\"    SOCIAL HISTORY/ LIVING ENVIRONMENT:   Home Environment: Private residence  One/Two Story Residence: Two story  Living Alone: No  Support Systems: Spouse/Significant Other  OBJECTIVE:     PAIN: VITAL SIGNS: LINES/DRAINS:   Pre Treatment:    Post Treatment: 2 Vital Signs  BP: 137/62  MAP (Calculated): 87  BP 1 Location: Right upper arm  BP Patient Position: Supine  Additional Blood Pressure/Pulse Data  BP 2: 138/67  MAP 2 (Calculated): 91  BP 2 Location: Right arm  Patient Position 2: Sitting  BP 3: 139/70  MAP 3 (Calculated): 93  Patient Position 3: Standing Briseno Catheter and IV  O2 Device: None (Room air)     MOBILITY: I Mod I S SBA CGA Min Mod Max Total  NT x2 Comments:   Bed Mobility    Rolling [] [x] [] [] [] [] [] [] [] [] []    Supine to Sit [] [x] [] [] [] [] [] [] [] [] []    Scooting [] [x] [] [] [] [] [] [] [] [] []    Sit to Supine [] [] [] [] [] [] [] [] [] [] []    Transfers    Sit to Stand [] [] [] [x] [] [] [] [] [] [] []    Bed to Chair [] [] [] [x] [] [] [] [] [] [] []    Stand to Sit [] [] [x] [] [] [] [] [] [] [] []    I=Independent, Mod I=Modified Independent, S=Supervision, SBA=Standby Assistance, CGA=Contact Guard Assistance,   Min=Minimal Assistance, Mod=Moderate Assistance, Max=Maximal Assistance, Total=Total Assistance, NT=Not Tested    BALANCE: Good Fair+ Fair Fair- Poor NT Comments   Sitting Static [x] [] [] [] [] []    Sitting Dynamic [x] [] [] [] [] []              Standing Static [] [x] [] [] [] []    Standing Dynamic [] [x] [] [] [] []      GAIT: I Mod I S SBA CGA Min Mod Max Total  NT x2 Comments:   Level of Assistance [] [] [] [x] [] [] [] [] [] [] []    Distance 500    DME Rolling Walker then no AD    Gait Quality good    Weightbearing  Status N/A     I=Independent, Mod I=Modified Independent, S=Supervision, SBA=Standby Assistance, CGA=Contact Guard Assistance,   Min=Minimal Assistance, Mod=Moderate Assistance, Max=Maximal Assistance, Total=Total Assistance, NT=Not Tested    PLAN:   FREQUENCY/DURATION: PT Plan of Care: BID for duration of hospital stay or until stated goals are met, whichever comes first.  TREATMENT:     TREATMENT:   ($$ Therapeutic Activity: 23-37 mins    )  Therapeutic Activity (25 Minutes):  Therapeutic activity included Rolling, Supine to Sit, Sit to Supine, Transfer Training, Sitting balance  and Standing balance to improve functional Mobility, Strength and Activity tolerance.     TREATMENT GRID:  N/A    AFTER TREATMENT POSITION/PRECAUTIONS:  Chair, Needs within reach, RN notified and Visitors at bedside    INTERDISCIPLINARY COLLABORATION:  RN/PCT and PT/PTA    TOTAL TREATMENT DURATION:  PT Patient Time In/Time Out  Time In: 0915  Time Out: 0945    Francoise Lowery PTA

## 2022-02-14 NOTE — PROGRESS NOTES
Pt is medically cleared for dc to home today with PT/OT services through [facility]. No other dc needs or concerns identified or reported. SW remains available to assist as needed. Care Management Interventions  PCP Verified by CM: Yes  Last Visit to PCP: 08/13/21  Mode of Transport at Discharge:  Other (see comment) (spouse)  Transition of Care Consult (CM Consult): [address]: Yes  Discharge Durable Medical Equipment: No  Physical Therapy Consult: Yes  Occupational Therapy Consult: No  Speech Therapy Consult: No  Support Systems: Spouse/Significant Other  Confirm Follow Up Transport: Family  The Plan for Transition of Care is Related to the Following Treatment Goals : Home health PT & OT services to improve pt's strength and functional abilities s/p spine surgery  The Patient and/or Patient Representative was Provided with a Choice of Provider and Agrees with the Discharge Plan?: Yes  Freedom of Choice List was Provided with Basic Dialogue that Supports the Patient's Individualized Plan of Care/Goals, Treatment Preferences and Shares the Quality Data Associated with the Providers?: Yes  Discharge Location  Patient Expects to be Discharged to[de-identified] Home with home health [facility])

## 2022-02-14 NOTE — PROGRESS NOTES
Patient lying in bed watching TV with no distress noted. IV infiltrated. New IV started. Assessment completed. Bed in low position with wheels locked. Call light in reach. Detail Level: Detailed Quality 130: Documentation Of Current Medications In The Medical Record: Current Medications Documented Quality 431: Preventive Care And Screening: Unhealthy Alcohol Use - Screening: Patient screened for unhealthy alcohol use using a single question and scores 2 or greater episodes per year and brief intervention occurred Quality 226: Preventive Care And Screening: Tobacco Use: Screening And Cessation Intervention: Patient screened for tobacco use and is an ex/non-smoker

## 2022-02-15 ENCOUNTER — HOME CARE VISIT (OUTPATIENT)
Dept: SCHEDULING | Facility: HOME HEALTH | Age: 77
End: 2022-02-15
Payer: MEDICARE

## 2022-02-15 VITALS
RESPIRATION RATE: 18 BRPM | DIASTOLIC BLOOD PRESSURE: 64 MMHG | SYSTOLIC BLOOD PRESSURE: 130 MMHG | HEART RATE: 72 BPM | OXYGEN SATURATION: 98 % | TEMPERATURE: 99 F

## 2022-02-15 PROCEDURE — 400018 HH-NO PAY CLAIM PROCEDURE

## 2022-02-15 PROCEDURE — G0151 HHCP-SERV OF PT,EA 15 MIN: HCPCS

## 2022-02-15 PROCEDURE — 400013 HH SOC

## 2022-02-15 PROCEDURE — 3331090001 HH PPS REVENUE CREDIT

## 2022-02-15 PROCEDURE — 3331090002 HH PPS REVENUE DEBIT

## 2022-02-16 PROCEDURE — 3331090002 HH PPS REVENUE DEBIT

## 2022-02-16 PROCEDURE — 3331090001 HH PPS REVENUE CREDIT

## 2022-02-16 NOTE — PROGRESS NOTES
Physician Progress Note      PATIENT:               Derick Keita  CSN #:                  [id number]  :                       1945  ADMIT DATE:       2/10/2022 5:41 AM  DISCH DATE:        2022 6:05 PM  RESPONDING  PROVIDER #:        Dank Gooden MD          QUERY TEXT:    Pt admitted with lumbar stenosis sp surgery this admission. Pt noted to have pre-op hgb of 13.4 dropping to 10.8 post op. . If possible, please document in the progress notes and discharge summary if you are evaluating and/or treating any of the following: The medical record reflects the following:  Risk Factors: SP surgery this admission  Clinical Indicators: hgb pre-op--13.4, post op-- 10.8, EBL surgery 125cc's. Treatment: monitoring of H/H, labs, fluids, essential home meds. Options provided:  -- Acute blood loss anemia  -- Chronic blood loss anemia  -- Acute on chronic blood loss anemia  -- Postoperative acute blood loss anemia  -- Dilutional anemia  -- Precipitous drop in Hemoglobin and Hematocrit  -- Other - I will add my own diagnosis  -- Disagree - Not applicable / Not valid  -- Disagree - Clinically unable to determine / Unknown  -- Refer to Clinical Documentation Reviewer    PROVIDER RESPONSE TEXT:    This patient has acute blood loss anemia.     Query created by: Arben Joyce on 2022 8:51 AM      Electronically signed by:  Dank Gooden MD 2/15/2022 9:22 PM

## 2022-02-17 PROCEDURE — 3331090002 HH PPS REVENUE DEBIT

## 2022-02-17 PROCEDURE — 3331090001 HH PPS REVENUE CREDIT

## 2022-02-18 ENCOUNTER — HOME CARE VISIT (OUTPATIENT)
Dept: SCHEDULING | Facility: HOME HEALTH | Age: 77
End: 2022-02-18
Payer: MEDICARE

## 2022-02-18 VITALS
TEMPERATURE: 97 F | DIASTOLIC BLOOD PRESSURE: 68 MMHG | HEART RATE: 68 BPM | RESPIRATION RATE: 16 BRPM | SYSTOLIC BLOOD PRESSURE: 120 MMHG | OXYGEN SATURATION: 97 %

## 2022-02-18 PROCEDURE — G0157 HHC PT ASSISTANT EA 15: HCPCS

## 2022-02-18 PROCEDURE — 3331090002 HH PPS REVENUE DEBIT

## 2022-02-18 PROCEDURE — G0152 HHCP-SERV OF OT,EA 15 MIN: HCPCS

## 2022-02-18 PROCEDURE — 3331090001 HH PPS REVENUE CREDIT

## 2022-02-19 PROCEDURE — 3331090001 HH PPS REVENUE CREDIT

## 2022-02-19 PROCEDURE — 3331090002 HH PPS REVENUE DEBIT

## 2022-02-20 VITALS
TEMPERATURE: 97.9 F | RESPIRATION RATE: 19 BRPM | SYSTOLIC BLOOD PRESSURE: 130 MMHG | OXYGEN SATURATION: 97 % | HEART RATE: 71 BPM | DIASTOLIC BLOOD PRESSURE: 70 MMHG

## 2022-02-20 PROCEDURE — 3331090001 HH PPS REVENUE CREDIT

## 2022-02-20 PROCEDURE — 3331090002 HH PPS REVENUE DEBIT

## 2022-02-21 PROCEDURE — 3331090001 HH PPS REVENUE CREDIT

## 2022-02-21 PROCEDURE — 3331090002 HH PPS REVENUE DEBIT

## 2022-02-22 PROCEDURE — 3331090002 HH PPS REVENUE DEBIT

## 2022-02-22 PROCEDURE — 3331090001 HH PPS REVENUE CREDIT

## 2022-02-23 ENCOUNTER — HOME CARE VISIT (OUTPATIENT)
Dept: SCHEDULING | Facility: HOME HEALTH | Age: 77
End: 2022-02-23
Payer: MEDICARE

## 2022-02-23 VITALS
TEMPERATURE: 98.4 F | DIASTOLIC BLOOD PRESSURE: 60 MMHG | RESPIRATION RATE: 15 BRPM | SYSTOLIC BLOOD PRESSURE: 116 MMHG | OXYGEN SATURATION: 96 % | HEART RATE: 72 BPM

## 2022-02-23 PROCEDURE — 3331090001 HH PPS REVENUE CREDIT

## 2022-02-23 PROCEDURE — G0157 HHC PT ASSISTANT EA 15: HCPCS

## 2022-02-23 PROCEDURE — 3331090002 HH PPS REVENUE DEBIT

## 2022-02-23 NOTE — PROGRESS NOTES
Physician Progress Note      PATIENT:               Kaycee Woods  CSN #:                  [id number]  :                       1945  ADMIT DATE:       2/10/2022 5:41 AM  DISCH DATE:        2022 6:05 PM  RESPONDING  PROVIDER #:        Dmitriy Rao NP          QUERY TEXT:    Patient admitted with low back pain and noted to have L2-S1 spinal stenosis with neurogenic claudication . If possible, please document in progress notes and discharge summary if you are evaluating and/or treating any of the following: The medical record reflects the following:  Risk Factors: L2-S2 stenosis with neurogenic claudication. Clinical Indicators: back and r leg pain. H&P stating-- \"I was unsure if the S1 nerve was the only problem or whether the pain  was a combination of the S1 nerve and the stenosis at L2-4. \"  Treatment: bilat L2-S1 laminectomy, partial facetectomy and foraminotomy. pain meds, labs, xray, PT, essential home meds. Options provided:  -- Release/decompression of lumbar/spinal cord  -- Release/decompression of lumbar/spinal nerve  -- Release/decompression of both lumbar/spinal cord and nerve  -- Other - I will add my own diagnosis  -- Disagree - Not applicable / Not valid  -- Disagree - Clinically unable to determine / Unknown  -- Refer to Clinical Documentation Reviewer    PROVIDER RESPONSE TEXT:    Provider disagreed with this query.   because treatment was surgery for both problems    Query created by: Pepe Mishra on 2022 2:59 PM      Electronically signed by:  Dmitriy Rao NP 2022 2:28 PM

## 2022-02-24 PROCEDURE — 3331090001 HH PPS REVENUE CREDIT

## 2022-02-24 PROCEDURE — 3331090002 HH PPS REVENUE DEBIT

## 2022-02-24 NOTE — DISCHARGE SUMMARY
[facility]  DISCHARGE SUMMARY    Name:  Gabriele Rivera  MR#:  [id number]  :  1945  ACCOUNT #:  [de-identified]  ADMIT DATE:  02/10/2022  DISCHARGE DATE:  2022    PRIMARY DIAGNOSIS:  Lumbar spinal stenosis. HISTORY OF PRESENT ILLNESS:  The patient is a 59-year-old gentleman who had multilevel spinal stenosis that has progressively worsened overtime and is limiting his ability to stand, walk, and carry out activities of daily living. Epidural steroid injections are no longer affording him good relief. So, he presented for surgical treatment. HOSPITAL COURSE:  The patient was taken to the operating room and he underwent an L2 through S1 laminectomy uneventfully. Because of the bleeding, we placed a Hemovac drain. Postoperatively, he reported good relief of his leg pain, but he had significant postoperative back pain. He was seen by Physical Therapy and started ambulating and he made good progress and his back pain gradually improved. He was originally planning to be discharged on postoperative day #3, but he had some orthostatic hypertension, which resolved with p.o fluids. He was able to tolerate removal of his Briseno catheter and his Hemovac drain and did well by postoperative day #4 with a hemoglobin of 10.8 and a negative urinalysis. We discharged him to home and I will see him back in follow up in two weeks. His dressing is currently dry without any drainage and told him he can change the dressing if needed. He may take a shower with the dressing in place and once he comes off in a week to 10 days, he can continue to take a shower. We gave him prescription for Norco for pain and I will see him back in two weeks. He will call if he has problems.       MD SADIQ [email or web address]/[email or web address]  D:  2022 13:07  T:  2022 2:39  JOB #:  [id number] rolling walker

## 2022-02-25 ENCOUNTER — HOME CARE VISIT (OUTPATIENT)
Dept: SCHEDULING | Facility: HOME HEALTH | Age: 77
End: 2022-02-25
Payer: MEDICARE

## 2022-02-25 VITALS
HEART RATE: 72 BPM | RESPIRATION RATE: 15 BRPM | TEMPERATURE: 98.3 F | OXYGEN SATURATION: 95 % | DIASTOLIC BLOOD PRESSURE: 72 MMHG | SYSTOLIC BLOOD PRESSURE: 134 MMHG

## 2022-02-25 PROCEDURE — 3331090001 HH PPS REVENUE CREDIT

## 2022-02-25 PROCEDURE — 3331090002 HH PPS REVENUE DEBIT

## 2022-02-25 PROCEDURE — G0157 HHC PT ASSISTANT EA 15: HCPCS

## 2022-02-26 PROCEDURE — 3331090002 HH PPS REVENUE DEBIT

## 2022-02-26 PROCEDURE — 3331090001 HH PPS REVENUE CREDIT

## 2022-02-27 PROCEDURE — 3331090002 HH PPS REVENUE DEBIT

## 2022-02-27 PROCEDURE — 3331090001 HH PPS REVENUE CREDIT

## 2022-02-28 PROCEDURE — 3331090002 HH PPS REVENUE DEBIT

## 2022-02-28 PROCEDURE — 3331090001 HH PPS REVENUE CREDIT

## 2022-03-01 ENCOUNTER — HOME CARE VISIT (OUTPATIENT)
Dept: SCHEDULING | Facility: HOME HEALTH | Age: 77
End: 2022-03-01
Payer: MEDICARE

## 2022-03-01 PROCEDURE — 3331090002 HH PPS REVENUE DEBIT

## 2022-03-01 PROCEDURE — 3331090001 HH PPS REVENUE CREDIT

## 2022-03-01 PROCEDURE — G0151 HHCP-SERV OF PT,EA 15 MIN: HCPCS

## 2022-03-03 ENCOUNTER — APPOINTMENT (OUTPATIENT)
Dept: GENERAL RADIOLOGY | Age: 77
DRG: 698 | End: 2022-03-03
Attending: STUDENT IN AN ORGANIZED HEALTH CARE EDUCATION/TRAINING PROGRAM
Payer: MEDICARE

## 2022-03-03 ENCOUNTER — APPOINTMENT (OUTPATIENT)
Dept: CT IMAGING | Age: 77
DRG: 698 | End: 2022-03-03
Attending: STUDENT IN AN ORGANIZED HEALTH CARE EDUCATION/TRAINING PROGRAM
Payer: MEDICARE

## 2022-03-03 ENCOUNTER — HOSPITAL ENCOUNTER (INPATIENT)
Age: 77
LOS: 5 days | Discharge: HOME | DRG: 698 | End: 2022-03-08
Attending: EMERGENCY MEDICINE | Admitting: INTERNAL MEDICINE
Payer: MEDICARE

## 2022-03-03 DIAGNOSIS — M54.16 LUMBAR RADICULAR PAIN: ICD-10-CM

## 2022-03-03 DIAGNOSIS — R55 NEAR SYNCOPE: ICD-10-CM

## 2022-03-03 DIAGNOSIS — A41.9 SEPSIS, DUE TO UNSPECIFIED ORGANISM, UNSPECIFIED WHETHER ACUTE ORGAN DYSFUNCTION PRESENT (HCC): Primary | ICD-10-CM

## 2022-03-03 DIAGNOSIS — R33.9 URINARY RETENTION: ICD-10-CM

## 2022-03-03 DIAGNOSIS — N39.0 URINARY TRACT INFECTION WITHOUT HEMATURIA, SITE UNSPECIFIED: ICD-10-CM

## 2022-03-03 LAB
ALBUMIN SERPL-MCNC: 3.4 G/DL (ref 3.2–4.6)
ALBUMIN/GLOB SERPL: 0.8 {RATIO} (ref 1.2–3.5)
ALP SERPL-CCNC: 69 U/L (ref 50–136)
ALT SERPL-CCNC: 23 U/L (ref 12–65)
ANION GAP SERPL CALC-SCNC: 9 MMOL/L (ref 7–16)
APPEARANCE UR: CLEAR
AST SERPL-CCNC: 13 U/L (ref 15–37)
BACTERIA URNS QL MICRO: ABNORMAL /HPF
BASOPHILS # BLD: 0.1 K/UL (ref 0–0.2)
BASOPHILS NFR BLD: 0 % (ref 0–2)
BILIRUB SERPL-MCNC: 0.6 MG/DL (ref 0.2–1.1)
BILIRUB UR QL: NEGATIVE
BUN SERPL-MCNC: 15 MG/DL (ref 8–23)
CALCIUM SERPL-MCNC: 9 MG/DL (ref 8.3–10.4)
CASTS URNS QL MICRO: ABNORMAL /LPF
CHLORIDE SERPL-SCNC: 100 MMOL/L (ref 98–107)
CO2 SERPL-SCNC: 25 MMOL/L (ref 21–32)
COLOR UR: YELLOW
CREAT SERPL-MCNC: 1 MG/DL (ref 0.8–1.5)
DIFFERENTIAL METHOD BLD: ABNORMAL
EOSINOPHIL # BLD: 0.1 K/UL (ref 0–0.8)
EOSINOPHIL NFR BLD: 0 % (ref 0.5–7.8)
EPI CELLS #/AREA URNS HPF: 0 /HPF
ERYTHROCYTE [DISTWIDTH] IN BLOOD BY AUTOMATED COUNT: 13.7 % (ref 11.9–14.6)
GLOBULIN SER CALC-MCNC: 4.1 G/DL (ref 2.3–3.5)
GLUCOSE SERPL-MCNC: 120 MG/DL (ref 65–100)
GLUCOSE UR STRIP.AUTO-MCNC: NEGATIVE MG/DL
HCT VFR BLD AUTO: 35.9 % (ref 41.1–50.3)
HGB BLD-MCNC: 12 G/DL (ref 13.6–17.2)
HGB UR QL STRIP: ABNORMAL
IMM GRANULOCYTES # BLD AUTO: 0.1 K/UL (ref 0–0.5)
IMM GRANULOCYTES NFR BLD AUTO: 0 % (ref 0–5)
KETONES UR QL STRIP.AUTO: NEGATIVE MG/DL
LACTATE SERPL-SCNC: 0.8 MMOL/L (ref 0.4–2)
LACTATE SERPL-SCNC: 1.8 MMOL/L (ref 0.4–2)
LEUKOCYTE ESTERASE UR QL STRIP.AUTO: ABNORMAL
LYMPHOCYTES # BLD: 2.3 K/UL (ref 0.5–4.6)
LYMPHOCYTES NFR BLD: 11 % (ref 13–44)
MCH RBC QN AUTO: 29.8 PG (ref 26.1–32.9)
MCHC RBC AUTO-ENTMCNC: 33.4 G/DL (ref 31.4–35)
MCV RBC AUTO: 89.1 FL (ref 79.6–97.8)
MONOCYTES # BLD: 2.4 K/UL (ref 0.1–1.3)
MONOCYTES NFR BLD: 12 % (ref 4–12)
NEUTS SEG # BLD: 16 K/UL (ref 1.7–8.2)
NEUTS SEG NFR BLD: 77 % (ref 43–78)
NITRITE UR QL STRIP.AUTO: NEGATIVE
NRBC # BLD: 0 K/UL (ref 0–0.2)
PH UR STRIP: 6.5 [PH] (ref 5–9)
PLATELET # BLD AUTO: 507 K/UL (ref 150–450)
PMV BLD AUTO: 8.9 FL (ref 9.4–12.3)
POTASSIUM SERPL-SCNC: 3.5 MMOL/L (ref 3.5–5.1)
PROCALCITONIN SERPL-MCNC: 0.19 NG/ML (ref 0–0.49)
PROT SERPL-MCNC: 7.5 G/DL (ref 6.3–8.2)
PROT UR STRIP-MCNC: NEGATIVE MG/DL
RBC # BLD AUTO: 4.03 M/UL (ref 4.23–5.6)
RBC #/AREA URNS HPF: ABNORMAL /HPF
SODIUM SERPL-SCNC: 134 MMOL/L (ref 138–145)
SP GR UR REFRACTOMETRY: 1.01 (ref 1–1.02)
TROPONIN-HIGH SENSITIVITY: 11.7 PG/ML (ref 0–14)
TROPONIN-HIGH SENSITIVITY: 6.2 PG/ML (ref 0–14)
UROBILINOGEN UR QL STRIP.AUTO: 0.2 EU/DL (ref 0.2–1)
WBC # BLD AUTO: 20.9 K/UL (ref 4.3–11.1)
WBC URNS QL MICRO: ABNORMAL /HPF

## 2022-03-03 PROCEDURE — 2709999900 HC NON-CHARGEABLE SUPPLY

## 2022-03-03 PROCEDURE — 80053 COMPREHEN METABOLIC PANEL: CPT

## 2022-03-03 PROCEDURE — 84145 PROCALCITONIN (PCT): CPT

## 2022-03-03 PROCEDURE — 74011250637 HC RX REV CODE- 250/637: Performed by: STUDENT IN AN ORGANIZED HEALTH CARE EDUCATION/TRAINING PROGRAM

## 2022-03-03 PROCEDURE — 93005 ELECTROCARDIOGRAM TRACING: CPT | Performed by: STUDENT IN AN ORGANIZED HEALTH CARE EDUCATION/TRAINING PROGRAM

## 2022-03-03 PROCEDURE — 83605 ASSAY OF LACTIC ACID: CPT

## 2022-03-03 PROCEDURE — 81001 URINALYSIS AUTO W/SCOPE: CPT

## 2022-03-03 PROCEDURE — 87186 SC STD MICRODIL/AGAR DIL: CPT

## 2022-03-03 PROCEDURE — 96365 THER/PROPH/DIAG IV INF INIT: CPT

## 2022-03-03 PROCEDURE — 84484 ASSAY OF TROPONIN QUANT: CPT

## 2022-03-03 PROCEDURE — 74177 CT ABD & PELVIS W/CONTRAST: CPT

## 2022-03-03 PROCEDURE — 87077 CULTURE AEROBIC IDENTIFY: CPT

## 2022-03-03 PROCEDURE — 85025 COMPLETE CBC W/AUTO DIFF WBC: CPT

## 2022-03-03 PROCEDURE — 74011250636 HC RX REV CODE- 250/636: Performed by: STUDENT IN AN ORGANIZED HEALTH CARE EDUCATION/TRAINING PROGRAM

## 2022-03-03 PROCEDURE — 87040 BLOOD CULTURE FOR BACTERIA: CPT

## 2022-03-03 PROCEDURE — 87088 URINE BACTERIA CULTURE: CPT

## 2022-03-03 PROCEDURE — 65270000029 HC RM PRIVATE

## 2022-03-03 PROCEDURE — 51702 INSERT TEMP BLADDER CATH: CPT

## 2022-03-03 PROCEDURE — 71045 X-RAY EXAM CHEST 1 VIEW: CPT

## 2022-03-03 PROCEDURE — 96375 TX/PRO/DX INJ NEW DRUG ADDON: CPT

## 2022-03-03 PROCEDURE — 87086 URINE CULTURE/COLONY COUNT: CPT

## 2022-03-03 PROCEDURE — 74011250637 HC RX REV CODE- 250/637: Performed by: INTERNAL MEDICINE

## 2022-03-03 PROCEDURE — 99285 EMERGENCY DEPT VISIT HI MDM: CPT

## 2022-03-03 PROCEDURE — 87205 SMEAR GRAM STAIN: CPT

## 2022-03-03 PROCEDURE — 74011000258 HC RX REV CODE- 258: Performed by: INTERNAL MEDICINE

## 2022-03-03 PROCEDURE — 74011000250 HC RX REV CODE- 250: Performed by: INTERNAL MEDICINE

## 2022-03-03 PROCEDURE — 74011000258 HC RX REV CODE- 258: Performed by: STUDENT IN AN ORGANIZED HEALTH CARE EDUCATION/TRAINING PROGRAM

## 2022-03-03 PROCEDURE — 74011250636 HC RX REV CODE- 250/636: Performed by: INTERNAL MEDICINE

## 2022-03-03 PROCEDURE — 96376 TX/PRO/DX INJ SAME DRUG ADON: CPT

## 2022-03-03 PROCEDURE — 74011250636 HC RX REV CODE- 250/636: Performed by: HOSPITALIST

## 2022-03-03 PROCEDURE — 74011000636 HC RX REV CODE- 636: Performed by: STUDENT IN AN ORGANIZED HEALTH CARE EDUCATION/TRAINING PROGRAM

## 2022-03-03 PROCEDURE — 96361 HYDRATE IV INFUSION ADD-ON: CPT

## 2022-03-03 RX ORDER — ROSUVASTATIN CALCIUM 20 MG/1
20 TABLET, COATED ORAL DAILY
Status: DISCONTINUED | OUTPATIENT
Start: 2022-03-04 | End: 2022-03-08 | Stop reason: HOSPADM

## 2022-03-03 RX ORDER — ONDANSETRON 2 MG/ML
4 INJECTION INTRAMUSCULAR; INTRAVENOUS
Status: COMPLETED | OUTPATIENT
Start: 2022-03-03 | End: 2022-03-03

## 2022-03-03 RX ORDER — EZETIMIBE 10 MG/1
10 TABLET ORAL DAILY
Status: DISCONTINUED | OUTPATIENT
Start: 2022-03-04 | End: 2022-03-08 | Stop reason: HOSPADM

## 2022-03-03 RX ORDER — HYDROMORPHONE HYDROCHLORIDE 1 MG/ML
1 INJECTION, SOLUTION INTRAMUSCULAR; INTRAVENOUS; SUBCUTANEOUS ONCE
Status: COMPLETED | OUTPATIENT
Start: 2022-03-03 | End: 2022-03-03

## 2022-03-03 RX ORDER — ACETAMINOPHEN 500 MG
1000 TABLET ORAL
Status: COMPLETED | OUTPATIENT
Start: 2022-03-03 | End: 2022-03-03

## 2022-03-03 RX ORDER — VANCOMYCIN 2 GRAM/500 ML IN 0.9 % SODIUM CHLORIDE INTRAVENOUS
2000 ONCE
Status: COMPLETED | OUTPATIENT
Start: 2022-03-03 | End: 2022-03-03

## 2022-03-03 RX ORDER — SODIUM CHLORIDE 0.9 % (FLUSH) 0.9 %
5-40 SYRINGE (ML) INJECTION EVERY 8 HOURS
Status: DISCONTINUED | OUTPATIENT
Start: 2022-03-03 | End: 2022-03-08 | Stop reason: HOSPADM

## 2022-03-03 RX ORDER — TEMAZEPAM 15 MG/1
15 CAPSULE ORAL
Status: DISCONTINUED | OUTPATIENT
Start: 2022-03-03 | End: 2022-03-08 | Stop reason: HOSPADM

## 2022-03-03 RX ORDER — KETOROLAC TROMETHAMINE 15 MG/ML
15 INJECTION, SOLUTION INTRAMUSCULAR; INTRAVENOUS
Status: COMPLETED | OUTPATIENT
Start: 2022-03-03 | End: 2022-03-03

## 2022-03-03 RX ORDER — ENOXAPARIN SODIUM 100 MG/ML
40 INJECTION SUBCUTANEOUS EVERY 24 HOURS
Status: DISCONTINUED | OUTPATIENT
Start: 2022-03-03 | End: 2022-03-08 | Stop reason: HOSPADM

## 2022-03-03 RX ORDER — LISINOPRIL AND HYDROCHLOROTHIAZIDE 12.5; 2 MG/1; MG/1
1 TABLET ORAL DAILY
Status: DISCONTINUED | OUTPATIENT
Start: 2022-03-04 | End: 2022-03-08 | Stop reason: HOSPADM

## 2022-03-03 RX ORDER — SODIUM CHLORIDE 0.9 % (FLUSH) 0.9 %
10 SYRINGE (ML) INJECTION
Status: COMPLETED | OUTPATIENT
Start: 2022-03-03 | End: 2022-03-03

## 2022-03-03 RX ORDER — LEVOTHYROXINE SODIUM 50 UG/1
50 TABLET ORAL
Status: DISCONTINUED | OUTPATIENT
Start: 2022-03-04 | End: 2022-03-08 | Stop reason: HOSPADM

## 2022-03-03 RX ORDER — HYDROMORPHONE HYDROCHLORIDE 1 MG/ML
0.5 INJECTION, SOLUTION INTRAMUSCULAR; INTRAVENOUS; SUBCUTANEOUS
Status: COMPLETED | OUTPATIENT
Start: 2022-03-03 | End: 2022-03-03

## 2022-03-03 RX ORDER — SODIUM CHLORIDE 0.9 % (FLUSH) 0.9 %
5-40 SYRINGE (ML) INJECTION AS NEEDED
Status: DISCONTINUED | OUTPATIENT
Start: 2022-03-03 | End: 2022-03-08 | Stop reason: HOSPADM

## 2022-03-03 RX ORDER — TAMSULOSIN HYDROCHLORIDE 0.4 MG/1
0.4 CAPSULE ORAL DAILY
Status: DISCONTINUED | OUTPATIENT
Start: 2022-03-04 | End: 2022-03-03 | Stop reason: SDUPTHER

## 2022-03-03 RX ORDER — POTASSIUM CHLORIDE 20 MEQ/1
20 TABLET, EXTENDED RELEASE ORAL DAILY
Status: DISCONTINUED | OUTPATIENT
Start: 2022-03-04 | End: 2022-03-08 | Stop reason: HOSPADM

## 2022-03-03 RX ORDER — LORAZEPAM 2 MG/ML
1 INJECTION INTRAMUSCULAR ONCE
Status: COMPLETED | OUTPATIENT
Start: 2022-03-03 | End: 2022-03-03

## 2022-03-03 RX ORDER — ACETAMINOPHEN 325 MG/1
650 TABLET ORAL
Status: DISCONTINUED | OUTPATIENT
Start: 2022-03-03 | End: 2022-03-08 | Stop reason: HOSPADM

## 2022-03-03 RX ORDER — TAMSULOSIN HYDROCHLORIDE 0.4 MG/1
0.4 CAPSULE ORAL DAILY
Status: DISCONTINUED | OUTPATIENT
Start: 2022-03-04 | End: 2022-03-08 | Stop reason: HOSPADM

## 2022-03-03 RX ORDER — ACETAMINOPHEN 650 MG/1
650 SUPPOSITORY RECTAL
Status: DISCONTINUED | OUTPATIENT
Start: 2022-03-03 | End: 2022-03-08 | Stop reason: HOSPADM

## 2022-03-03 RX ORDER — PANTOPRAZOLE SODIUM 40 MG/1
40 TABLET, DELAYED RELEASE ORAL
Status: DISCONTINUED | OUTPATIENT
Start: 2022-03-04 | End: 2022-03-08 | Stop reason: HOSPADM

## 2022-03-03 RX ORDER — ONDANSETRON 4 MG/1
4 TABLET, ORALLY DISINTEGRATING ORAL
Status: DISCONTINUED | OUTPATIENT
Start: 2022-03-03 | End: 2022-03-08 | Stop reason: HOSPADM

## 2022-03-03 RX ORDER — AMLODIPINE BESYLATE 5 MG/1
5 TABLET ORAL DAILY
Status: DISCONTINUED | OUTPATIENT
Start: 2022-03-04 | End: 2022-03-08 | Stop reason: HOSPADM

## 2022-03-03 RX ORDER — POLYETHYLENE GLYCOL 3350 17 G/17G
17 POWDER, FOR SOLUTION ORAL DAILY PRN
Status: DISCONTINUED | OUTPATIENT
Start: 2022-03-03 | End: 2022-03-08 | Stop reason: HOSPADM

## 2022-03-03 RX ORDER — LANOLIN ALCOHOL/MO/W.PET/CERES
1000 CREAM (GRAM) TOPICAL DAILY
Status: DISCONTINUED | OUTPATIENT
Start: 2022-03-04 | End: 2022-03-08 | Stop reason: HOSPADM

## 2022-03-03 RX ORDER — ONDANSETRON 2 MG/ML
4 INJECTION INTRAMUSCULAR; INTRAVENOUS
Status: DISCONTINUED | OUTPATIENT
Start: 2022-03-03 | End: 2022-03-08 | Stop reason: HOSPADM

## 2022-03-03 RX ORDER — ASPIRIN 81 MG/1
81 TABLET ORAL DAILY
Status: DISCONTINUED | OUTPATIENT
Start: 2022-03-04 | End: 2022-03-08 | Stop reason: HOSPADM

## 2022-03-03 RX ADMIN — ACETAMINOPHEN 1000 MG: 500 TABLET, FILM COATED ORAL at 13:08

## 2022-03-03 RX ADMIN — SODIUM CHLORIDE 100 ML: 9 INJECTION, SOLUTION INTRAVENOUS at 14:35

## 2022-03-03 RX ADMIN — LORAZEPAM 1 MG: 2 INJECTION INTRAMUSCULAR; INTRAVENOUS at 23:12

## 2022-03-03 RX ADMIN — ENOXAPARIN SODIUM 40 MG: 100 INJECTION SUBCUTANEOUS at 21:46

## 2022-03-03 RX ADMIN — Medication 10 ML: at 14:35

## 2022-03-03 RX ADMIN — SODIUM CHLORIDE, PRESERVATIVE FREE 10 ML: 5 INJECTION INTRAVENOUS at 21:47

## 2022-03-03 RX ADMIN — PIPERACILLIN SODIUM AND TAZOBACTAM SODIUM 4.5 G: 4; .5 INJECTION, POWDER, LYOPHILIZED, FOR SOLUTION INTRAVENOUS at 21:47

## 2022-03-03 RX ADMIN — IOPAMIDOL 100 ML: 755 INJECTION, SOLUTION INTRAVENOUS at 14:35

## 2022-03-03 RX ADMIN — ONDANSETRON 4 MG: 2 INJECTION INTRAMUSCULAR; INTRAVENOUS at 12:59

## 2022-03-03 RX ADMIN — CEFTRIAXONE 1 G: 1 INJECTION, POWDER, FOR SOLUTION INTRAMUSCULAR; INTRAVENOUS at 13:11

## 2022-03-03 RX ADMIN — KETOROLAC TROMETHAMINE 15 MG: 15 INJECTION, SOLUTION INTRAMUSCULAR; INTRAVENOUS at 17:10

## 2022-03-03 RX ADMIN — SODIUM CHLORIDE 1000 ML: 900 INJECTION, SOLUTION INTRAVENOUS at 15:18

## 2022-03-03 RX ADMIN — PREGABALIN 225 MG: 150 CAPSULE ORAL at 21:46

## 2022-03-03 RX ADMIN — VANCOMYCIN HYDROCHLORIDE 2000 MG: 10 INJECTION, POWDER, LYOPHILIZED, FOR SOLUTION INTRAVENOUS at 18:35

## 2022-03-03 RX ADMIN — HYDROMORPHONE HYDROCHLORIDE 1 MG: 1 INJECTION, SOLUTION INTRAMUSCULAR; INTRAVENOUS; SUBCUTANEOUS at 13:48

## 2022-03-03 RX ADMIN — HYDROMORPHONE HYDROCHLORIDE 0.5 MG: 1 INJECTION, SOLUTION INTRAMUSCULAR; INTRAVENOUS; SUBCUTANEOUS at 12:59

## 2022-03-03 RX ADMIN — ONDANSETRON 4 MG: 2 INJECTION INTRAMUSCULAR; INTRAVENOUS at 13:48

## 2022-03-03 RX ADMIN — SODIUM CHLORIDE 1000 ML: 900 INJECTION, SOLUTION INTRAVENOUS at 12:48

## 2022-03-03 NOTE — H&P
Hospitalist History and Physical   Admit Date:  3/3/2022 12:30 PM   Name:  Bryan Perez   Age:  68 y.o. Sex:  male  :  1945   MRN:  [id number]   Room:  ER02/02    Presenting Complaint: No chief complaint on file. Reason(s) for Admission: Sepsis ([facility] [address] [A41.9]     History of Present Illness:   Bryan Perez is a 68 y.o. male with medical history of HLP, BPH, HTN, GERD, hypothyroidism, CAD who presented with report of fevers, rigors, weakness and relative hypotension with presyncope episode in ER waiting room. Pt s/p L2-S1 laminectomy 2/10/22. Has been recovering well at home and working with PT and walking w/o assistance. Reports chronic back pain unchanged after surgery. Was discharged after surgery with kidd catheter and seen by urology this AM for kidd removal. Told to return at 3p if could not spontaneously void. Was unable to void but also felt so bad otherwise he decided to come to the ED. No nausea,vomiting. Admits to suprapubic tenderness. Hx of BPH and nocturia 2-3x that was unchanged prior to lumbar surgery. ER workup WBC 20K, hgb 12, plts 507, UA wbc 20-50, bact +1, Na 134, BUn 15, Cr 1.0, ALT 23, AST 13, lactic acid 0.8  CT shows enlarged prostate, cholelithiasis, stable right adrenal mass, postop changes of L spine w/o fluid/abscess identified. Hospitalist asked to admit for UTI/ sepsis. Review of Systems:  10 systems reviewed and negative except as noted in HPI. Assessment & Plan:       # Sepsis  - secondary to suspected UTI  - has received IVF bolus, IV antibiotics  - follow cultures as below    # UTI  # urinary retention  - empiric vanco/zosyn  - Follow for blood and urine culture  - Kidd re-inserted in ED  - start Flomax  - consult to urology    # Lumbar stenosis s/p l2-s1 laminectomy 2/10  - Incision site well healed.  No focal tenderness and no overt fluid collection or abscess on CT  - doubt postoperative spine infection  - continue lyrica    # HLP  - statin    # Hypothyroidism  - synthroid    # HTN - HYPOtensive on arrival  - holding home meds, resume as appropriate    # Gerd  - PPI    # CAD  - no chest pain  - continue asa/statin        Dispo/Discharge Planning:   Pending likely 3-4 days    Diet: No diet orders on file  VTE ppx: lovenox  Code status: Prior    Hospital Problems as of 3/3/2022 Date Reviewed: 3/3/2022          Codes Class Noted - Resolved POA    Sepsis ([facility] [address] ICD-10-CM: A41.9  ICD-9-CM: 038.9, 995.91  3/3/2022 - Present Unknown              Past History:  Past Medical History:   Diagnosis Date    Axonal polyneuropathy 2018    B12 deficiency 2019    BPH (benign prostatic hyperplasia) 2012    CAD (coronary artery disease) 2020    Ca score 65    Diverticulitis     Family history of diabetes mellitus     GERD (gastroesophageal reflux disease)     medication    HDL deficiency 2012    Hemorrhoid     int and ext    Hypertension 2012    medication    Hypokalemia     Hypothyroid     medication    Leukocytosis     Lumbar radicular pain 2019    Murmur, cardiac     echo 20  EF 55-60%    Pain of right hip joint 2019    Paresthesia of right foot 2018    Prediabetes     diet control and weight loss  21 A1c 6.4    Right leg pain 2018     Past Surgical History:   Procedure Laterality Date    HX COLONOSCOPY      HX ENDOSCOPY      HX ORTHOPAEDIC      right leg surgery to remove wire at age 12      No Known Allergies   Social History     Tobacco Use    Smoking status: Former Smoker     Packs/day: 0.00     Years: 30.00     Pack years: 0.00     Quit date:      Years since quittin.1    Smokeless tobacco: Never Used   Substance Use Topics    Alcohol use: No      Family History   Problem Relation Age of Onset    Diabetes Mother     Cancer Sister         Had Leukemia    Cancer Brother 72        lung cancer/lung disease    Cancer Sister 76        breast cancer    Lung Disease Brother         COPD      Family history reviewed and negative except as noted above. Immunization History   Administered Date(s) Administered    COVID-19, Pfizer Purple top, DILUTE for use, 12+ yrs, 30mcg/0.3mL dose 03/31/2021, 04/21/2021    Influenza High Dose Vaccine PF 09/20/2016, 10/10/2017, 10/25/2018, 09/29/2020, 10/01/2021    Pneumococcal Conjugate (PCV-13) 05/16/2016    Pneumococcal Polysaccharide (PPSV-23) 01/14/2010, 05/25/2017     Prior to Admit Medications:  Current Outpatient Medications   Medication Instructions    acetaminophen (TYLENOL EXTRA STRENGTH) 1,000 mg, Oral, EVERY 6 HOURS AS NEEDED    amLODIPine (NORVASC) 5 mg, Oral, DAILY    aspirin delayed-release 81 mg, Oral, DAILY    cyanocobalamin 1,000 mcg, Oral, DAILY    ezetimibe (ZETIA) 10 mg, Oral, DAILY    levothyroxine (SYNTHROID) 50 mcg, Oral, DAILY BEFORE BREAKFAST    lisinopril-hydroCHLOROthiazide (Zestoretic) 20-12.5 mg per tablet 1 Tablet, Oral, DAILY    multivitamin (ONE A DAY) tablet 1 Tablet, Oral, DAILY    omeprazole (PRILOSEC) 40 mg, Oral, DAILY, 30 minutes prior to the largest meal of the day.  phenazopyridine (PYRIDIUM) 100 mg, Oral, 3 TIMES DAILY AS NEEDED    potassium chloride (K-DUR, KLOR-CON) 20 mEq tablet 20 mEq, Oral, DAILY    pregabalin (LYRICA) 75 mg capsule Take 1-3 capsules by mouth QHS.     pregabalin (LYRICA) 225 mg, Oral, 2 TIMES DAILY    rosuvastatin (CRESTOR) 20 mg, Oral, EVERY BEDTIME    silodosin (RAPAFLO) 8 mg, Oral, DAILY WITH BREAKFAST       Objective:     Patient Vitals for the past 24 hrs:   Temp Pulse Resp BP SpO2   03/03/22 1520 (!) 103.1 °F (39.5 °C)       03/03/22 1515  (!) 107 15 (!) 124/59 95 %   03/03/22 1400  (!) 105 16 (!) 155/68 95 %   03/03/22 1310 (!) 102.5 °F (39.2 °C)       03/03/22 1243 98.3 °F (36.8 °C) 95 16 (!) 157/107 99 %     Oxygen Therapy  O2 Sat (%): 95 % (03/03/22 1515)  Pulse via Oximetry: 106 beats per minute (03/03/22 1515)  O2 Device: None (Room air) (03/03/22 1243)    Estimated body mass index is 26.82 kg/m² as calculated from the following:    Height as of 2/2/22: 6' 2\" (1.88 m). Weight as of 2/2/22: 94.8 kg (208 lb 14.4 oz). No intake or output data in the 24 hours ending 03/03/22 1726      Physical Exam:  Blood pressure (!) 124/59, pulse (!) 107, temperature (!) 103.1 °F (39.5 °C), resp. rate 15, SpO2 95 %. General:    Well nourished. No overt distress  Head:  Normocephalic, atraumatic  Eyes:  Sclerae appear normal.  Pupils equally round. ENT:  Nares appear normal, no drainage. Moist oral mucosa  Neck:  No restricted ROM. Trachea midline   CV:   RRR. No m/r/g. No jugular venous distension. Lungs:   CTAB. No wheezing, rhonchi, or rales. Respirations even, unlabored  Abdomen: Bowel sounds present. Soft, nontender, nondistended. Extremities: No cyanosis or clubbing. No edema  Skin:     No rashes and normal coloration. Warm and dry. Neuro:  CN II-XII grossly intact. Sensation intact. A&Ox3  Psych:  Normal mood and affect. I have reviewed ordered lab tests and independently visualized imaging below:    Last 24hr Labs:  Recent Results (from the past 24 hour(s))   CBC WITH AUTOMATED DIFF    Collection Time: 03/03/22 12:43 PM   Result Value Ref Range    WBC 20.9 (H) 4.3 - 11.1 K/uL    RBC 4.03 (L) 4.23 - 5.6 M/uL    HGB 12.0 (L) 13.6 - 17.2 g/dL    HCT 35.9 (L) 41.1 - 50.3 %    MCV 89.1 79.6 - 97.8 FL    MCH 29.8 26.1 - 32.9 PG    MCHC 33.4 31.4 - 35.0 g/dL    RDW 13.7 11.9 - 14.6 %    PLATELET 267 (H) 258 - 450 K/uL    MPV 8.9 (L) 9.4 - 12.3 FL    ABSOLUTE NRBC 0.00 0.0 - 0.2 K/uL    DF AUTOMATED      NEUTROPHILS 77 43 - 78 %    LYMPHOCYTES 11 (L) 13 - 44 %    MONOCYTES 12 4.0 - 12.0 %    EOSINOPHILS 0 (L) 0.5 - 7.8 %    BASOPHILS 0 0.0 - 2.0 %    IMMATURE GRANULOCYTES 0 0.0 - 5.0 %    ABS. NEUTROPHILS 16.0 (H) 1.7 - 8.2 K/UL    ABS. LYMPHOCYTES 2.3 0.5 - 4.6 K/UL    ABS. MONOCYTES 2.4 (H) 0.1 - 1.3 K/UL    ABS.  EOSINOPHILS 0.1 0.0 - 0.8 K/UL    ABS. BASOPHILS 0.1 0.0 - 0.2 K/UL    ABS. IMM. GRANS. 0.1 0.0 - 0.5 K/UL   METABOLIC PANEL, COMPREHENSIVE    Collection Time: 03/03/22 12:43 PM   Result Value Ref Range    Sodium 134 (L) 138 - 145 mmol/L    Potassium 3.5 3.5 - 5.1 mmol/L    Chloride 100 98 - 107 mmol/L    CO2 25 21 - 32 mmol/L    Anion gap 9 7 - 16 mmol/L    Glucose 120 (H) 65 - 100 mg/dL    BUN 15 8 - 23 MG/DL    Creatinine 1.00 0.8 - 1.5 MG/DL    GFR est AA >60 >60 ml/min/1.73m2    GFR est non-AA >60 >60 ml/min/1.73m2    Calcium 9.0 8.3 - 10.4 MG/DL    Bilirubin, total 0.6 0.2 - 1.1 MG/DL    ALT (SGPT) 23 12 - 65 U/L    AST (SGOT) 13 (L) 15 - 37 U/L    Alk.  phosphatase 69 50 - 136 U/L    Protein, total 7.5 6.3 - 8.2 g/dL    Albumin 3.4 3.2 - 4.6 g/dL    Globulin 4.1 (H) 2.3 - 3.5 g/dL    A-G Ratio 0.8 (L) 1.2 - 3.5     LACTIC ACID    Collection Time: 03/03/22 12:43 PM   Result Value Ref Range    Lactic acid 1.8 0.4 - 2.0 MMOL/L   TROPONIN-HIGH SENSITIVITY    Collection Time: 03/03/22 12:43 PM   Result Value Ref Range    Troponin-High Sensitivity 6.2 0 - 14 pg/mL   PROCALCITONIN    Collection Time: 03/03/22 12:43 PM   Result Value Ref Range    Procalcitonin 0.19 0.00 - 0.49 ng/mL   URINALYSIS W/ RFLX MICROSCOPIC    Collection Time: 03/03/22 12:52 PM   Result Value Ref Range    Color YELLOW      Appearance CLEAR      Specific gravity 1.009 1.001 - 1.023      pH (UA) 6.5 5.0 - 9.0      Protein Negative NEG mg/dL    Glucose Negative mg/dL    Ketone Negative NEG mg/dL    Bilirubin Negative NEG      Blood SMALL (A) NEG      Urobilinogen 0.2 0.2 - 1.0 EU/dL    Nitrites Negative NEG      Leukocyte Esterase SMALL (A) NEG      WBC 20-50 0 /hpf    RBC 5-10 0 /hpf    Epithelial cells 0 0 /hpf    Bacteria 1+ (H) 0 /hpf    Casts 0-3 0 /lpf   TROPONIN-HIGH SENSITIVITY    Collection Time: 03/03/22  3:16 PM   Result Value Ref Range    Troponin-High Sensitivity 11.7 0 - 14 pg/mL   LACTIC ACID    Collection Time: 03/03/22  3:16 PM   Result Value Ref Range    Lactic acid 0.8 0.4 - 2.0 MMOL/L       All Micro Results     Procedure Component Value Units Date/Time    CULTURE, BLOOD [[id number]] Collected: 03/03/22 1708    Order Status: Completed Specimen: Blood Updated: 03/03/22 1724    CULTURE, BLOOD [[id number]] Collected: 03/03/22 1338    Order Status: Completed Specimen: Blood Updated: 03/03/22 1449          Other Studies:  CT ABD PELV W CONT    Result Date: 3/3/2022  CT ABDOMEN AND PELVIS WITH CONTRAST HISTORY:  49-year-old male patient presenting to the ER with reports of generalized ill feeling, inability urinate, lightheadedness and near syncopal episode. Patient reports feeling dizzy and lightheaded -and shaky all over. TECHNIQUE: Helically acquired images were obtained from the domes of the diaphragms to the ischial tuberosities reconstructed at 5mm intervals after the uneventful administration of 100c's of intravenous Isovue-370 in order to better evaluate the solid abdominal viscera and vascular structures. Oral contrast was not administered per the emergency imaging BMI protocol. Coronal and sagittal reformatted images were submitted. Radiation dose reduction techniques were used for this study:  Our CT scanners use one or all of the following: Automated exposure control, adjustment of the mA and/or kVp according to patient's size, iterative reconstruction. COMPARISON: 11/05/2015 CT ABDOMEN: There is mild dependent atelectatic changes within the lower lobes posteriorly. Stable low-density lesions are present within the liver with the larger most compatible with a cyst measuring up to 1.1 cm. There is a 9 mm stone within the gallbladder neck, unchanged. There is a stable right adrenal gland nodule measuring 2.2 cm. The pancreas is unremarkable. There is a 1.5 cm cyst at the midpole right kidney. There is a punctate nonobstructing calculus at the midpole right kidney. No adenopathy or ascites is present. There are no inflammatory changes. Atherosclerotic changes are present involving the abdominal aorta. There are postoperative changes of the lumbar spine status post posterior decompression from at L2, L3 and L5. No suspicious gas and fluid collection is identified CT PELVIS: No adenopathy or ascites is present. There are no inflammatory changes. A Briseno catheter is present within the urinary bladder. The prostate gland is enlarged measuring approximately 4.6 x 6.5 x 6.0 cm in AP, transverse and craniocaudal dimensions, respectively. No aggressive osseous lesion is present. There is a mild fecal impaction in the rectum. 1. Enlarged prostate gland. 2. Cholelithiasis. 3. Stable right adrenal gland mass. 4. Postoperative changes of the lumbar spine without definite fluid collection/abscess identified. XR CHEST PORT    Result Date: 3/3/2022  Portable chest: History: Severe SOB Comparison: None Findings: A single view of the chest was obtained at 12:57 PM. The cardiac silhouette is normal in size and configuration. The lungs and pleural spaces are clear. The pulmonary vascularity is within normal limits. Unremarkable portable chest radiograph.        Medications Administered     acetaminophen (TYLENOL) tablet 1,000 mg     Admin Date  03/03/2022 Action  Given Dose  1,000 mg Route  Oral Administered By  [facility], Blease Gallus          cefTRIAXone (ROCEPHIN) 1 g in 0.9% sodium chloride (MBP/ADV) 50 mL MBP     Admin Date  03/03/2022 Action  New Bag Dose  1 g Rate  100 mL/hr Route  IntraVENous Administered By  [facility], Blease Gallus          HYDROmorphone (DILAUDID) injection 0.5 mg     Admin Date  03/03/2022 Action  Given Dose  0.5 mg Route  IntraVENous Administered By  [facility], Blease Gallus          HYDROmorphone (DILAUDID) injection 1 mg     Admin Date  03/03/2022 Action  Given Dose  1 mg Route  IntraVENous Administered By  [facility], Blease Gallus          iopamidoL (ISOVUE-370) 76 % injection 100 mL     Admin Date  03/03/2022 Action  Given Dose  100 mL Route  IntraVENous Administered By  Holly Melgar          ketorolac (TORADOL) injection 15 mg     Admin Date  03/03/2022 Action  Given Dose  15 mg Route  IntraVENous Administered By  Mario Chavez RN          ondansetron [facility]) injection 4 mg     Admin Date  03/03/2022 Action  Given Dose  4 mg Route  IntraVENous Administered By  Lewis Chatman Date  03/03/2022 Action  Given Dose  4 mg Route  IntraVENous Administered By  [place], Keshawn          saline peripheral flush soln 10 mL     Admin Date  03/03/2022 Action  Given Dose  10 mL Route  InterCATHeter Administered By  Holly Melgar          sodium chloride 0.9 % bolus infusion 1,000 mL     Admin Date  03/03/2022 Action  New Bag Dose  1,000 mL Rate  1,000 mL/hr Route  IntraVENous Administered By  Royal Andrews Date  03/03/2022 Action  New Bag Dose  1,000 mL Rate  1,000 mL/hr Route  IntraVENous Administered By  Terrie Raman          sodium chloride 0.9 % bolus infusion 100 mL     Admin Date  03/03/2022 Action  New Bag Dose  100 mL Rate   Route  IntraVENous Administered By  Holly Meglar                Signed:  Jac Moseley DO    Part of this note may have been written by using a voice dictation software. The note has been proof read but may still contain some grammatical/other typographical errors.

## 2022-03-03 NOTE — ED PROVIDER NOTES
69-year-old male patient presenting to the ER with reports of generalized ill feeling, inability urinate, lightheadedness and near syncopal episode. Syncope occurred in the lobby prior to my evaluation. Patient reports feeling dizzy and lightheaded and shaky all over. Nursing staff denies full loss of consciousness though states patient got very close. No injury during this event. Patient recently underwent back surgery and reports ongoing pain in his lower back. This is unchanged since surgery. He had a Briseno catheter following this procedure that was removed today by urology. He was instructed to return to the office if he was unable to urinate by 3:00. Patient began feeling poorly thus ended up in this department. He denies chest pain pressure or tightness and reports no significant shortness of breath. His main complaint at this time is bladder discomfort and shaky feeling. Patient family denies any recent nausea vomiting or fever. The history is provided by the patient and the spouse. No  was used.         Past Medical History:   Diagnosis Date    Axonal polyneuropathy 12/19/2018    B12 deficiency 6/20/2019    BPH (benign prostatic hyperplasia) 8/14/2012    CAD (coronary artery disease) 08/19/2020    Ca score 65    Diverticulitis     Family history of diabetes mellitus     GERD (gastroesophageal reflux disease)     medication    HDL deficiency 8/14/2012    Hemorrhoid     int and ext    Hypertension 8/14/2012    medication    Hypokalemia     Hypothyroid     medication    Leukocytosis     Lumbar radicular pain 6/20/2019    Murmur, cardiac     echo 9/4/20  EF 55-60%    Pain of right hip joint 6/20/2019    Paresthesia of right foot 12/19/2018    Prediabetes     diet control and weight loss  8/13/21 A1c 6.4    Right leg pain 12/19/2018       Past Surgical History:   Procedure Laterality Date    HX COLONOSCOPY  2020    HX ENDOSCOPY      HX ORTHOPAEDIC right leg surgery to remove wire at age 12         Family History:   Problem Relation Age of Onset    Diabetes Mother     Cancer Sister         Had Leukemia    Cancer Brother 72        lung cancer/lung disease    Cancer Sister 76        breast cancer    Lung Disease Brother         COPD       Social History     Socioeconomic History    Marital status:      Spouse name: Not on file    Number of children: Not on file    Years of education: Not on file    Highest education level: Not on file   Occupational History    Not on file   Tobacco Use    Smoking status: Former Smoker     Packs/day: 0.00     Years: 30.00     Pack years: 0.00     Quit date:      Years since quittin.1    Smokeless tobacco: Never Used   Vaping Use    Vaping Use: Never used   Substance and Sexual Activity    Alcohol use: No    Drug use: Not Currently    Sexual activity: Not on file   Other Topics Concern    Not on file   Social History Narrative    / 2 Children/ father  from muscle disease             Social Determinants of Health     Financial Resource Strain:     Difficulty of Paying Living Expenses: Not on file   Food Insecurity:     Worried About [id number] in the Last Year: Not on file    [address] in the Last Year: Not on file   Transportation Needs:     Lack of Transportation (Medical): Not on file    Lack of Transportation (Non-Medical):  Not on file   Physical Activity:     Days of Exercise per Week: Not on file    Minutes of Exercise per Session: Not on file   Stress:     Feeling of Stress : Not on file   Social Connections:     Frequency of Communication with Friends and Family: Not on file    Frequency of Social Gatherings with Friends and Family: Not on file    Attends [employer]ism Services: Not on file    Active Member of Clubs or Organizations: Not on file    Attends Club or Organization Meetings: Not on file    Marital Status: Not on file   Intimate Partner Violence:     Fear of Current or Ex-Partner: Not on file    Emotionally Abused: Not on file    Physically Abused: Not on file    Sexually Abused: Not on file   Housing Stability:     Unable to Pay for Housing in the Last Year: Not on file    Number of Places Lived in the Last Year: Not on file    Unstable Housing in the Last Year: Not on file         ALLERGIES: Patient has no known allergies. Review of Systems   Constitutional: Positive for chills. Negative for diaphoresis and fever. HENT: Negative for congestion, sneezing and sore throat. Eyes: Negative for visual disturbance. Respiratory: Negative for cough, chest tightness, shortness of breath and wheezing. Cardiovascular: Negative for chest pain and leg swelling. Gastrointestinal: Positive for abdominal pain. Negative for blood in stool, diarrhea, nausea and vomiting. Endocrine: Negative for polyuria. Genitourinary: Positive for difficulty urinating. Negative for dysuria, flank pain, hematuria and urgency. Musculoskeletal: Negative for back pain, myalgias, neck pain and neck stiffness. Skin: Negative for color change and rash. Neurological: Positive for tremors, syncope and light-headedness. Negative for dizziness, speech difficulty, weakness, numbness and headaches. Psychiatric/Behavioral: Negative for behavioral problems. All other systems reviewed and are negative. There were no vitals filed for this visit. Physical Exam  Vitals and nursing note reviewed. Constitutional:       General: He is not in acute distress. Appearance: He is well-developed. He is not diaphoretic. Comments: Anxious, tremulous appearing elderly male patient alert and oriented to person place and time. Mild acute distress, speaks in clear, fluid sentences. HENT:      Head: Normocephalic and atraumatic.       Right Ear: External ear normal.      Left Ear: External ear normal.      Nose: Nose normal.   Eyes:      Pupils: Pupils are equal, round, and reactive to light. Cardiovascular:      Rate and Rhythm: Normal rate and regular rhythm. Heart sounds: Normal heart sounds. No murmur heard. No friction rub. No gallop. Pulmonary:      Effort: Pulmonary effort is normal. No respiratory distress. Breath sounds: Normal breath sounds. No stridor. No decreased breath sounds, wheezing, rhonchi or rales. Chest:      Chest wall: No tenderness. Abdominal:      General: There is no distension. Palpations: Abdomen is soft. There is no mass. Tenderness: There is abdominal tenderness in the suprapubic area. There is no guarding or rebound. Hernia: No hernia is present. Comments: Tenderness and guarding in the suprapubic region with palpable distention of the bladder   Musculoskeletal:         General: No tenderness or deformity. Normal range of motion. Cervical back: Normal range of motion. Skin:     General: Skin is warm and dry. Neurological:      Mental Status: He is alert and oriented to person, place, and time. Cranial Nerves: No cranial nerve deficit. MDM  Number of Diagnoses or Management Options  Diagnosis management comments: EKG interpretation: Sinus tachycardia, significant underlying artifact, distorts tracing. Normal axis, no obvious ischemia. Rate of 104. Bedside ultrasound obtained on arrival, significant bladder distention, patient unable to urinate, will replace Briseno.        Amount and/or Complexity of Data Reviewed  Clinical lab tests: reviewed and ordered  Tests in the radiology section of CPT®: ordered and reviewed  Tests in the medicine section of CPT®: ordered and reviewed  Independent visualization of images, tracings, or specimens: yes    Risk of Complications, Morbidity, and/or Mortality  Presenting problems: moderate  Diagnostic procedures: low  Management options: moderate           Bedside US    Date/Time: 3/3/2022 12:38 PM  Performed by: Gladis Louis DO  Authorized by: Tiburcio Lewis DO     Written consent obtained: Yes    Emergent situation    Given by:  Patient  Performed by: Attending  Type of procedure: Focused renal/urinary tract  Indications:  Urinary retention  Right kidney long axis (coronal): Not obtained  Right kidney short axis:  Not obtained  Left kidney long axis (coronal):   Not obtained  Left kidney short axis:  Not obtained  Transverse bladder:  Adequate  Sagittal bladder:  Adequate  Bladder size:  Distended

## 2022-03-03 NOTE — ED TRIAGE NOTES
Patient arrives to ED pov from home. Patient had a back surgery 3 weeks ago and had a catheter in place. Patient had catheter removed this morning. Patient started feeling light headed and having \"shakes\". Patient had syncopal episode when arriving to ED. Patient taken to room. MD Jarvis at bedside. Bedside ultrasound done. Briseno placed.

## 2022-03-03 NOTE — PROGRESS NOTES
VANCO DAILY FOLLOW UP NOTE  [address] [facility] Pharmacokinetic Monitoring Service - Vancomycin    Consulting Provider: Khadijah Wharton   Indication: Sepsis  Target Concentration: Goal AUC/FELIX 400-600 mg*hr/L  Day of Therapy: 1  Additional Antimicrobials: Pip-tazo    Pertinent Laboratory Values: Wt Readings from Last 1 Encounters:   03/03/22 94.8 kg (208 lb 15.9 oz)     Temp Readings from Last 1 Encounters:   03/03/22 99 °F (37.2 °C)     No components found for: PROCAL  Recent Labs     03/03/22  1516 03/03/22  1243   BUN  --  15   CREA  --  1.00   WBC  --  20.9*   PCT  --  0.19   LAC 0.8 1.8     Estimated Creatinine Clearance: 73.1 mL/min (based on SCr of 1 mg/dL). No results found for: Cristobal Gee    MRSA Nasal Swab: N/A. Non-respiratory infection. .      Assessment:  Date/Time Dose Concentration AUC         Note: Serum concentrations collected for AUC dosing may appear elevated if collected in close proximity to the dose administered, this is not necessarily an indication of toxicity    Plan:  Dosing per [address]   Vancomycin 2000 mg X 1, then 750 mg Q12H for predicted AUC/Tr of 439/14.9  Repeat vancomycin concentrations will be ordered as clinically appropriate   Pharmacy will continue to monitor patient and adjust therapy as indicated    Thank you for the consult,  Moni Matute, OBINNAD

## 2022-03-03 NOTE — PROGRESS NOTES
TRANSFER - IN REPORT:    [place] [facility]  being received from ER for routine progression of care      Report consisted of patients Situation, Background, Assessment and   Recommendations(SBAR). Information from the following report(s) SBAR and ED Summary was reviewed with the receiving nurse. Opportunity for questions and clarification was provided. Assessment completed upon patients arrival to unit and care assumed.

## 2022-03-03 NOTE — ACP (ADVANCE CARE PLANNING)
[email or web address] Hospitalist Service  At the heart of better care     Advance Care Planning   Admit Date:  3/3/2022 12:30 PM   Name:  Nikita Nolasco   Age:  68 y.o. Sex:  male  :  1945   MRN:  [id number]   Room:  [place] [address]    Nikita Nolasco is able to make his own decisions: Yes    If pt unable to make decisions, POA/surrogate decision maker:  Wife Marla Levi [phone]     Other members present in the meeting:   wife    Patient / surrogate decision-maker directed:  Code Status: FULL CODE -full aggressive medical and surgical interventions, including intubations, resuscitations, pressors, artificial tube feeding    Other ACP topics discussed, if applicable:   none    Patient or surrogate consented to discussion of the current conditions, workup, management plans, prognosis, and understand the risk for further deterioration. Time spent: 16 minutes in direct discussion (face to face and/or over phone).     Signed:  Celina Dumas DO

## 2022-03-03 NOTE — ED NOTES
TRANSFER - OUT REPORT:    Verbal report given to Orville Chandler RN on Tano Mancilla  being transferred to  [id number]  for routine progression of care       Report consisted of patients Situation, Background, Assessment and   Recommendations(SBAR). Information from the following report(s) SBAR and ED Summary was reviewed with the receiving nurse. Lines:   Peripheral IV 03/03/22 Right Antecubital (Active)       Peripheral IV 03/03/22 Left Antecubital (Active)        Opportunity for questions and clarification was provided.       Patient transported with:   [employer]

## 2022-03-04 LAB
ALBUMIN SERPL-MCNC: 2.5 G/DL (ref 3.2–4.6)
ALBUMIN/GLOB SERPL: 0.7 {RATIO} (ref 1.2–3.5)
ALP SERPL-CCNC: 57 U/L (ref 50–136)
ALT SERPL-CCNC: 14 U/L (ref 12–65)
ANION GAP SERPL CALC-SCNC: 11 MMOL/L (ref 7–16)
AST SERPL-CCNC: 19 U/L (ref 15–37)
BASOPHILS # BLD: 0.1 K/UL (ref 0–0.2)
BASOPHILS NFR BLD: 0 % (ref 0–2)
BILIRUB SERPL-MCNC: 1 MG/DL (ref 0.2–1.1)
BUN SERPL-MCNC: 14 MG/DL (ref 8–23)
CALCIUM SERPL-MCNC: 8.1 MG/DL (ref 8.3–10.4)
CHLORIDE SERPL-SCNC: 107 MMOL/L (ref 98–107)
CO2 SERPL-SCNC: 21 MMOL/L (ref 21–32)
CREAT SERPL-MCNC: 0.9 MG/DL (ref 0.8–1.5)
DIFFERENTIAL METHOD BLD: ABNORMAL
EOSINOPHIL # BLD: 0.1 K/UL (ref 0–0.8)
EOSINOPHIL NFR BLD: 0 % (ref 0.5–7.8)
ERYTHROCYTE [DISTWIDTH] IN BLOOD BY AUTOMATED COUNT: 13.9 % (ref 11.9–14.6)
GLOBULIN SER CALC-MCNC: 3.4 G/DL (ref 2.3–3.5)
GLUCOSE SERPL-MCNC: 103 MG/DL (ref 65–100)
HCT VFR BLD AUTO: 30.4 % (ref 41.1–50.3)
HGB BLD-MCNC: 10 G/DL (ref 13.6–17.2)
IMM GRANULOCYTES # BLD AUTO: 0.3 K/UL (ref 0–0.5)
IMM GRANULOCYTES NFR BLD AUTO: 1 % (ref 0–5)
LYMPHOCYTES # BLD: 1.4 K/UL (ref 0.5–4.6)
LYMPHOCYTES NFR BLD: 6 % (ref 13–44)
MCH RBC QN AUTO: 29.3 PG (ref 26.1–32.9)
MCHC RBC AUTO-ENTMCNC: 32.9 G/DL (ref 31.4–35)
MCV RBC AUTO: 89.1 FL (ref 79.6–97.8)
MONOCYTES # BLD: 2.7 K/UL (ref 0.1–1.3)
MONOCYTES NFR BLD: 11 % (ref 4–12)
NEUTS SEG # BLD: 19.1 K/UL (ref 1.7–8.2)
NEUTS SEG NFR BLD: 81 % (ref 43–78)
NRBC # BLD: 0 K/UL (ref 0–0.2)
PLATELET # BLD AUTO: 374 K/UL (ref 150–450)
PMV BLD AUTO: 9.2 FL (ref 9.4–12.3)
POTASSIUM SERPL-SCNC: 3.2 MMOL/L (ref 3.5–5.1)
PROT SERPL-MCNC: 5.9 G/DL (ref 6.3–8.2)
RBC # BLD AUTO: 3.41 M/UL (ref 4.23–5.6)
SODIUM SERPL-SCNC: 139 MMOL/L (ref 136–145)
WBC # BLD AUTO: 23.5 K/UL (ref 4.3–11.1)

## 2022-03-04 PROCEDURE — 74011000250 HC RX REV CODE- 250: Performed by: INTERNAL MEDICINE

## 2022-03-04 PROCEDURE — 85025 COMPLETE CBC W/AUTO DIFF WBC: CPT

## 2022-03-04 PROCEDURE — 99223 1ST HOSP IP/OBS HIGH 75: CPT | Performed by: UROLOGY

## 2022-03-04 PROCEDURE — 65270000029 HC RM PRIVATE

## 2022-03-04 PROCEDURE — 80053 COMPREHEN METABOLIC PANEL: CPT

## 2022-03-04 PROCEDURE — 36415 COLL VENOUS BLD VENIPUNCTURE: CPT

## 2022-03-04 PROCEDURE — 74011250636 HC RX REV CODE- 250/636: Performed by: FAMILY MEDICINE

## 2022-03-04 PROCEDURE — 74011000258 HC RX REV CODE- 258: Performed by: INTERNAL MEDICINE

## 2022-03-04 PROCEDURE — 74011250637 HC RX REV CODE- 250/637: Performed by: FAMILY MEDICINE

## 2022-03-04 PROCEDURE — 74011250637 HC RX REV CODE- 250/637: Performed by: INTERNAL MEDICINE

## 2022-03-04 PROCEDURE — 74011250636 HC RX REV CODE- 250/636: Performed by: INTERNAL MEDICINE

## 2022-03-04 RX ORDER — MORPHINE SULFATE 4 MG/ML
2 INJECTION INTRAVENOUS
Status: DISCONTINUED | OUTPATIENT
Start: 2022-03-04 | End: 2022-03-08 | Stop reason: HOSPADM

## 2022-03-04 RX ORDER — POTASSIUM CHLORIDE 20 MEQ/1
40 TABLET, EXTENDED RELEASE ORAL 2 TIMES DAILY
Status: COMPLETED | OUTPATIENT
Start: 2022-03-04 | End: 2022-03-05

## 2022-03-04 RX ADMIN — EZETIMIBE 10 MG: 10 TABLET ORAL at 08:26

## 2022-03-04 RX ADMIN — TAMSULOSIN HYDROCHLORIDE 0.4 MG: 0.4 CAPSULE ORAL at 08:26

## 2022-03-04 RX ADMIN — VANCOMYCIN HYDROCHLORIDE 750 MG: 750 INJECTION, POWDER, LYOPHILIZED, FOR SOLUTION INTRAVENOUS at 05:20

## 2022-03-04 RX ADMIN — MORPHINE SULFATE 2 MG: 4 INJECTION INTRAVENOUS at 15:30

## 2022-03-04 RX ADMIN — PIPERACILLIN SODIUM AND TAZOBACTAM SODIUM 4.5 G: 4; .5 INJECTION, POWDER, LYOPHILIZED, FOR SOLUTION INTRAVENOUS at 09:31

## 2022-03-04 RX ADMIN — SODIUM CHLORIDE, PRESERVATIVE FREE 10 ML: 5 INJECTION INTRAVENOUS at 14:58

## 2022-03-04 RX ADMIN — ASPIRIN 81 MG: 81 TABLET ORAL at 08:26

## 2022-03-04 RX ADMIN — POTASSIUM CHLORIDE 20 MEQ: 20 TABLET, EXTENDED RELEASE ORAL at 08:26

## 2022-03-04 RX ADMIN — POTASSIUM CHLORIDE 40 MEQ: 20 TABLET, EXTENDED RELEASE ORAL at 14:13

## 2022-03-04 RX ADMIN — PANTOPRAZOLE SODIUM 40 MG: 40 TABLET, DELAYED RELEASE ORAL at 05:20

## 2022-03-04 RX ADMIN — PIPERACILLIN SODIUM AND TAZOBACTAM SODIUM 4.5 G: 4; .5 INJECTION, POWDER, LYOPHILIZED, FOR SOLUTION INTRAVENOUS at 17:41

## 2022-03-04 RX ADMIN — ENOXAPARIN SODIUM 40 MG: 100 INJECTION SUBCUTANEOUS at 17:41

## 2022-03-04 RX ADMIN — SODIUM CHLORIDE, PRESERVATIVE FREE 10 ML: 5 INJECTION INTRAVENOUS at 05:21

## 2022-03-04 RX ADMIN — SODIUM CHLORIDE, PRESERVATIVE FREE 10 ML: 5 INJECTION INTRAVENOUS at 05:20

## 2022-03-04 RX ADMIN — POTASSIUM CHLORIDE 40 MEQ: 20 TABLET, EXTENDED RELEASE ORAL at 17:41

## 2022-03-04 RX ADMIN — PREGABALIN 225 MG: 150 CAPSULE ORAL at 21:24

## 2022-03-04 RX ADMIN — ROSUVASTATIN CALCIUM 20 MG: 20 TABLET, FILM COATED ORAL at 08:26

## 2022-03-04 RX ADMIN — SODIUM CHLORIDE, PRESERVATIVE FREE 10 ML: 5 INJECTION INTRAVENOUS at 21:25

## 2022-03-04 RX ADMIN — VANCOMYCIN HYDROCHLORIDE 750 MG: 750 INJECTION, POWDER, LYOPHILIZED, FOR SOLUTION INTRAVENOUS at 17:41

## 2022-03-04 RX ADMIN — ACETAMINOPHEN 650 MG: 325 TABLET ORAL at 02:51

## 2022-03-04 RX ADMIN — SODIUM CHLORIDE, PRESERVATIVE FREE 10 ML: 5 INJECTION INTRAVENOUS at 21:24

## 2022-03-04 RX ADMIN — ACETAMINOPHEN 650 MG: 325 TABLET ORAL at 15:29

## 2022-03-04 RX ADMIN — MORPHINE SULFATE 2 MG: 4 INJECTION INTRAVENOUS at 21:24

## 2022-03-04 RX ADMIN — LEVOTHYROXINE SODIUM 50 MCG: 0.05 TABLET ORAL at 05:20

## 2022-03-04 RX ADMIN — PIPERACILLIN SODIUM AND TAZOBACTAM SODIUM 4.5 G: 4; .5 INJECTION, POWDER, LYOPHILIZED, FOR SOLUTION INTRAVENOUS at 02:11

## 2022-03-04 RX ADMIN — CYANOCOBALAMIN TAB 1000 MCG 1000 MCG: 1000 TAB at 08:26

## 2022-03-04 NOTE — PROGRESS NOTES
MSN, CM:  Spoke with patient this AM about discharge planning. Patient lives with his wife \"Ester\" in own house with 2 steps for entrance. Inside home has 14 steps leading to all bedrooms. Patient has two daughters \"Jessica and Lazarus Critchley" which both live locally. Patient is independent with all ADL's and requires no equipment for ambulation. Patient denies any home oxygen, or rehab in the past.  [facility] has [facility] [place] in the past.  Patient confirms PCP is Dr. Curtis Olea and patient drives himself to all appointments. PT consulted for evaluation and recommendations. Patient was to start outpatient therapy today with [facility] per wife. Case Management will continue to follow for any discharge needs. Care Management Interventions  PCP Verified by CM: Yes (Dr. Curtis Olea)  Mode of Transport at Discharge:  Other (see comment) (family to transport)  Health Maintenance Reviewed: Yes  Physical Therapy Consult: Yes  Support Systems: Spouse/Significant Other,Child(madeline)  Confirm Follow Up Transport: Self  Freedom of Choice List was Provided with Basic Dialogue that Supports the Patient's Individualized Plan of Care/Goals, Treatment Preferences and Shares the Quality Data Associated with the Providers?: Yes  Discharge Location  Patient Expects to be Discharged to[de-identified] Home

## 2022-03-04 NOTE — CONSULTS
[facility]  [place], [address] [facility]  [phone]    Izabel Norris  : 1945     HPI   68 y.o., male referred by Dr. Eduar Lowery MD for urinary retention. S/P L2-S1 laminectomy by Dr. Anai Hernandez on 2/10/21. Pt has failed several voiding trials post op. Briseno removed yesterday by NP. Subsequently developed progressive malaise, fevers and weakness and presented to ED. Briseno replaced. Started on Rapaflo by NP. Currently being empirically treated for urosepsis with Vancomycin and Zosyn. Seen by Dr. Azalea Quezada at last admission.       Past Medical History:   Diagnosis Date    Axonal polyneuropathy 2018    B12 deficiency 2019    BPH (benign prostatic hyperplasia) 2012    CAD (coronary artery disease) 2020    Ca score 65    Diverticulitis     Family history of diabetes mellitus     GERD (gastroesophageal reflux disease)     medication    HDL deficiency 2012    Hemorrhoid     int and ext    Hypertension 2012    medication    Hypokalemia     Hypothyroid     medication    Leukocytosis     Lumbar radicular pain 2019    Murmur, cardiac     echo 20  EF 55-60%    Pain of right hip joint 2019    Paresthesia of right foot 2018    Prediabetes     diet control and weight loss  21 A1c 6.4    Right leg pain 2018     Past Surgical History:   Procedure Laterality Date    HX COLONOSCOPY      HX ENDOSCOPY      HX ORTHOPAEDIC      right leg surgery to remove wire at age 12     Current Facility-Administered Medications   Medication Dose Route Frequency Provider Last Rate Last Admin    [START ON 3/5/2022] vancomycin random level reminder   Other Skaina Doran MD        potassium chloride (K-DUR, KLOR-CON M20) SR tablet 40 mEq  40 mEq Oral BID Naty Paulson MD        piperacillin-tazobactam (ZOSYN) 4.5 g in 0.9% sodium chloride (MBP/ADV) 100 mL MBP  4.5 g IntraVENous Q8H Salima Pinzon, DO 25 mL/hr at 03/04/22 0931 4.5 g at 03/04/22 0931    sodium chloride (NS) flush 5-40 mL  5-40 mL IntraVENous Q8H Salima Pinzon, DO   10 mL at 03/04/22 0521    sodium chloride (NS) flush 5-40 mL  5-40 mL IntraVENous PRN [place] Go C, DO        enoxaparin (LOVENOX) injection 40 mg  40 mg SubCUTAneous Q24H Arianne Pinzon, DO   40 mg at 03/03/22 2146    tamsulosin (FLOMAX) capsule 0.4 mg  0.4 mg Oral DAILY Arianne Pinzon, DO   0.4 mg at 03/04/22 4132    [Held by provider] amLODIPine (NORVASC) tablet 5 mg  5 mg Oral DAILY Myra Martino, DO        aspirin delayed-release tablet 81 mg  81 mg Oral DAILY Loida Buoy, DO   81 mg at 03/04/22 7364    cyanocobalamin tablet 1,000 mcg  1,000 mcg Oral DAILY Sulma WAKEFIELD, DO   1,000 mcg at 03/04/22 2480    ezetimibe (ZETIA) tablet 10 mg  10 mg Oral DAILY Ritu WAKEFIELD, DO   10 mg at 03/04/22 4953    levothyroxine (SYNTHROID) tablet 50 mcg  50 mcg Oral ACB Ritu WAKEFIELD, DO   50 mcg at 03/04/22 0920    [Held by provider] lisinopril-hydroCHLOROthiazide (PRINZIDE, ZESTORETIC) 20-12.5 mg per tablet 1 Tablet  1 Tablet Oral DAILY Myra Martino,         pantoprazole (PROTONIX) tablet 40 mg  40 mg Oral ACB Salima Pinzon, DO   40 mg at 03/04/22 0520    potassium chloride (K-DUR, KLOR-CON M20) SR tablet 20 mEq  20 mEq Oral DAILY Arianne Pinzon, DO   20 mEq at 03/04/22 1474    pregabalin (LYRICA) capsule 225 mg  225 mg Oral QHS Arianne Pinzon, DO   225 mg at 03/03/22 2146    rosuvastatin (CRESTOR) tablet 20 mg  20 mg Oral DAILY Salima Pinzon, DO   20 mg at 03/04/22 4059    sodium chloride (NS) flush 5-40 mL  5-40 mL IntraVENous Q8H Salima Pinzon C, DO   10 mL at 03/04/22 0520    sodium chloride (NS) flush 5-40 mL  5-40 mL IntraVENous PRN Sulma Go C, DO        acetaminophen (TYLENOL) tablet 650 mg  650 mg Oral Q6H PRN ZaidsonRock Bill C, DO   650 mg at 03/04/22 2114    Or    acetaminophen (TYLENOL) suppository 650 mg  650 mg Rectal Q6H PRN Tra Bear DO        polyethylene glycol (MIRALAX) packet 17 g  17 g Oral DAILY PRN Sanjay De La Rosa C, DO        ondansetron (ZOFRAN ODT) tablet 4 mg  4 mg Oral Q8H PRN Sanjay De La Rosa C, DO        Or    ondansetron [facility]) injection 4 mg  4 mg IntraVENous Q6H PRN Salima Pinzon C, DO        vancomycin (VANCOCIN) 750 mg in 0.9% sodium chloride 250 mL ([id number])  750 mg IntraVENous Q12H Orin WAKEFIELD  mL/hr at 22 0520 750 mg at 22 0520    temazepam (RESTORIL) capsule 15 mg  15 mg Oral QHS PRN Jamel Forebs MD         No Known Allergies  Social History     Socioeconomic History    Marital status:      Spouse name: Not on file    Number of children: Not on file    Years of education: Not on file    Highest education level: Not on file   Occupational History    Not on file   Tobacco Use    Smoking status: Former Smoker     Packs/day: 0.00     Years: 30.00     Pack years: 0.00     Quit date:      Years since quittin.1    Smokeless tobacco: Never Used   Vaping Use    Vaping Use: Never used   Substance and Sexual Activity    Alcohol use: No    Drug use: Not Currently    Sexual activity: Not on file   Other Topics Concern    Not on file   Social History Narrative    / 2 Children/ father  from muscle disease             Social Determinants of Health     Financial Resource Strain:     Difficulty of Paying Living Expenses: Not on file   Food Insecurity:     Worried About Running Out of Food in the Last Year: Not on file    Lennie of Food in the Last Year: Not on file   Transportation Needs:     Lack of Transportation (Medical): Not on file    Lack of Transportation (Non-Medical):  Not on file   Physical Activity:     Days of Exercise per Week: Not on file    Minutes of Exercise per Session: Not on file   Stress:     Feeling of Stress : Not on file   Social Connections:     Frequency of Communication with Friends and Family: Not on file    Frequency of Social Gatherings with Friends and Family: Not on file    Attends Mandaeism Services: Not on file    Active Member of Clubs or Organizations: Not on file    Attends Club or Organization Meetings: Not on file    Marital Status: Not on file   Intimate Partner Violence:     Fear of Current or Ex-Partner: Not on file    Emotionally Abused: Not on file    Physically Abused: Not on file    Sexually Abused: Not on file   Housing Stability:     Unable to Pay for Housing in the Last Year: Not on file    Number of [place] in the Last Year: Not on file    Unstable Housing in the Last Year: Not on file     Family History   Problem Relation Age of Onset    Diabetes Mother     Cancer Sister         Had Leukemia    Cancer Brother 72        lung cancer/lung disease    Cancer Sister 76        breast cancer    Lung Disease Brother         COPD       Review of Systems  All systems reviewed and are negative at this time. Physical Exam  Visit Vitals  BP (!) 117/56   Pulse 81   Temp 99.6 °F (37.6 °C)   Resp 19   Wt 208 lb 15.9 oz (94.8 kg)   SpO2 93%   BMI 26.83 kg/m²     General appearance - alert, well appearing, and in no distress  Mental status - alert, oriented to person, place, and time  Eyes - extraocular eye movements intact, sclera anicteric  Nose - normal and patent, no erythema, discharge or polyps  Mouth - mucous membranes moist  Abdomen - soft, nontender, nondistended, no masses or organomegaly  Lymphatic-  No palpable lymphadenopathy  Neurological -  normal speech, no focal findings or movement disorder noted  Musculoskeletal - no deformity or swelling  Extremities - no pedal edema, no clubbing or cyanosis  Skin - normal coloration and turgor    WBC 23.5K. Cr 0.90.  UA +. Cultures pending. CT 3/3-BPH, cholelithiasis, stable R adrenal mass and post op changes to lumber spine. Normal upper tracts. Assessment/Plan    ICD-10-CM ICD-9-CM    1.  Sepsis, due to unspecified organism, unspecified whether acute organ dysfunction present ([facility] [address]  A41.9 038.9      995.91    2. Urinary tract infection without hematuria, site unspecified  N39.0 599.0    3. Near syncope  R55 780.2      Post op AUR/urosepsis. Cont Briseno. Await final C&S. Cont empiric abx. Leave Briseno in place. Pt will need cystoscopy with Dr. Dannette Spurling after discharge with Briseno. Call for questions/concerns.     Thais Benitez, DO

## 2022-03-04 NOTE — PROGRESS NOTES
VANCO DAILY FOLLOW UP NOTE  [address] [facility] Pharmacokinetic Monitoring Service - Vancomycin    Consulting Provider: Aditya Min   Indication: Sepsis  Target Concentration: Goal AUC/FELIX 400-600 mg*hr/L  Day of Therapy: 2  Additional Antimicrobials: Pip-tazo    Pertinent Laboratory Values: Wt Readings from Last 1 Encounters:   03/03/22 94.8 kg (208 lb 15.9 oz)     Temp Readings from Last 1 Encounters:   03/04/22 98.1 °F (36.7 °C)     No components found for: PROCAL  Recent Labs     03/04/22  0341 03/03/22  1516 03/03/22  1243   BUN 14  --  15   CREA 0.90  --  1.00   WBC 23.5*  --  20.9*   PCT  --   --  0.19   LAC  --  0.8 1.8     Estimated Creatinine Clearance: 81.2 mL/min (based on SCr of 0.9 mg/dL). No results found for: Mary Dewitt    MRSA Nasal Swab: N/A. Non-respiratory infection. .      Assessment:  Date/Time Dose Concentration AUC         Note: Serum concentrations collected for AUC dosing may appear elevated if collected in close proximity to the dose administered, this is not necessarily an indication of toxicity    Plan:  Dosing recommendations based on Bayesian software  Continue vancomycin 750 mg IV q12h  Anticipated AUC of 409 and trough concentration of 13.6 at steady state  Renal labs as indicated   Vancomycin concentration ordered for 3/5 @ 0400    Pharmacy will continue to monitor patient and adjust therapy as indicated    Thank you for the consult,  Janine Mercado, OBINNAD

## 2022-03-04 NOTE — PROGRESS NOTES
Patient in bed resting quietly; no s/s of distress. Patient resting comfortably on RA. Patient alert and oriented 4X, skin clear and intact. Patient instructed on call light use. Safety measures in place. Will monitor.

## 2022-03-04 NOTE — PROGRESS NOTES
completed initial visit with patient, as requested. Wife was at bedside and supportive. Patient requested prayer.  provided pastoral presence, prayer and empathetic listening.   Signed by  Eric Gomez M.Div.

## 2022-03-04 NOTE — PROGRESS NOTES
Hospitalist Progress Note   Admit Date:  3/3/2022 12:30 PM   Name:  Loc Walker   Age:  68 y.o. Sex:  male  :  1945   MRN:  [id number]   Room:  7/    Presenting Complaint: No chief complaint on file. Reason(s) for Admission: Sepsis [facility]) [A41.9]     Hospital Course & Interval History:   Loc Walker is a 68 y.o. male with medical history of HLP, BPH, HTN, GERD, hypothyroidism, CAD who presented with report of fevers, rigors, weakness and relative hypotension with presyncope episode. Pt s/p L2-S1 laminectomy 2/10/22 and was discharged home with Briseno catheter due to urinary tension. Patient admitted with sepsis secondary to UTI and started on empiric broad-spectrum antibiotics. Subjective/24hr Events (22): Patient is seen at the bedside. Reports he is feeling the same. Continues to spike fevers and complaining of shivering and chills. Tmax 103.2. Also endorses some nausea and had vomiting last night. Able to tolerate breakfast today morning. Denies chest pain, palpitation but reports mild shortness of breath. ROS:  10 systems reviewed and negative except as noted above. Assessment & Plan:     # Sepsis secondary to CAP UTI  # Urinary retention  - empiric vanco/zosyn  - Follow for blood and urine culture  - Briseno re-inserted in ED  - Continue Flomax  - Consult to urology     # Lumbar stenosis s/p l2-s1 laminectomy 2/10  - Incision site well healed. No focal tenderness and no overt fluid collection or abscess on CT  - doubt postoperative spine infection  - continue lyrica     # HLP  - Statin     # Hypothyroidism  - Synthroid     # HTN  - Holding home meds due to borderline BP, resume as appropriate     # Gerd  - PPI     # CAD  - continue asa/statin      Dispo/Discharge Planning:  TBD, PT/OT    Diet:  ADULT DIET Regular;  Low Sodium (2 gm)  DVT PPx: Lovenox  Code status: Full Code    Hospital Problems as of 3/4/2022 Date Reviewed: 3/3/2022          Codes Class Noted - Resolved POA    * (Principal) Sepsis ([facility] [address] ICD-10-CM: A41.9  ICD-9-CM: 038.9, 995.91  3/3/2022 - Present Unknown        Spinal stenosis of lumbar region at multiple levels ICD-10-CM: M48.061  ICD-9-CM: 724.02  2/10/2022 - Present Yes        Coronary artery disease due to calcified coronary lesion ICD-10-CM: I25.10, I25.84  ICD-9-CM: 414.00, 414.4  11/12/2020 - Present Yes        Essential hypertension ICD-10-CM: I10  ICD-9-CM: 401.9  9/24/2020 - Present Yes        Mixed hyperlipidemia ICD-10-CM: E78.2  ICD-9-CM: 272.2  5/16/2016 - Present Yes        Hypothyroidism due to acquired atrophy of thyroid ICD-10-CM: E03.4  ICD-9-CM: 244.8, 246.8  4/15/2015 - Present Yes              Objective:     Patient Vitals for the past 24 hrs:   Temp Pulse Resp BP SpO2   03/04/22 1104 99.6 °F (37.6 °C) 81  (!) 117/56 93 %   03/04/22 0716 98.1 °F (36.7 °C) 76 20 107/61 98 %   03/04/22 0400 98.1 °F (36.7 °C)       03/04/22 0246 (!) 102.1 °F (38.9 °C) 97 18 130/69 94 %   03/03/22 2354 98.7 °F (37.1 °C) 99 26 120/60 90 %   03/03/22 1954 97.9 °F (36.6 °C) 81 20 129/60 95 %   03/03/22 1835 99 °F (37.2 °C)       03/03/22 1702 (!) 103.2 °F (39.6 °C)       03/03/22 1520 (!) 103.1 °F (39.5 °C)       03/03/22 1515  (!) 107 15 (!) 124/59 95 %   03/03/22 1400  (!) 105 16 (!) 155/68 95 %   03/03/22 1310 (!) 102.5 °F (39.2 °C)       03/03/22 1243 98.3 °F (36.8 °C) 95 16 (!) 157/107 99 %     Oxygen Therapy  O2 Sat (%): 93 % (03/04/22 1104)  Pulse via Oximetry: 106 beats per minute (03/03/22 1515)  O2 Device: None (Room air) (03/04/22 0716)    Estimated body mass index is 26.83 kg/m² as calculated from the following:    Height as of 2/2/22: 6' 2\" (1.88 m). Weight as of this encounter: 94.8 kg (208 lb 15.9 oz).     Intake/Output Summary (Last 24 hours) at 3/4/2022 1105  Last data filed at 3/4/2022 0246  Gross per 24 hour   Intake    Output 900 ml   Net -900 ml         Physical Exam:     Blood pressure (!) 117/56, pulse 81, temperature 99.6 °F (37.6 °C), resp. rate 20, weight 94.8 kg (208 lb 15.9 oz), SpO2 93 %. General:    No overt distress  Head:  Normocephalic, atraumatic  Eyes:  Sclerae appear normal.  Pupils equally round. ENT:  Nares appear normal, no drainage. Moist oral mucosa  Neck:  No restricted ROM. Trachea midline   CV:   RRR. No m/r/g. No jugular venous distension. Lungs:   CTAB. No wheezing, rhonchi, or rales. Respirations even, unlabored  Abdomen: Bowel sounds present. Soft, nontender, nondistended. Extremities: No cyanosis or clubbing. No edema  Skin:     No rashes and normal coloration. Warm and dry. Neuro:  CN II-XII grossly intact. Sensation intact. A&Ox3  Psych:  Normal mood and affect. I have reviewed ordered lab tests and independently visualized imaging below:    Recent Labs:  Recent Results (from the past 48 hour(s))   CBC WITH AUTOMATED DIFF    Collection Time: 03/03/22 12:43 PM   Result Value Ref Range    WBC 20.9 (H) 4.3 - 11.1 K/uL    RBC 4.03 (L) 4.23 - 5.6 M/uL    HGB 12.0 (L) 13.6 - 17.2 g/dL    HCT 35.9 (L) 41.1 - 50.3 %    MCV 89.1 79.6 - 97.8 FL    MCH 29.8 26.1 - 32.9 PG    MCHC 33.4 31.4 - 35.0 g/dL    RDW 13.7 11.9 - 14.6 %    PLATELET 203 (H) 471 - 450 K/uL    MPV 8.9 (L) 9.4 - 12.3 FL    ABSOLUTE NRBC 0.00 0.0 - 0.2 K/uL    DF AUTOMATED      NEUTROPHILS 77 43 - 78 %    LYMPHOCYTES 11 (L) 13 - 44 %    MONOCYTES 12 4.0 - 12.0 %    EOSINOPHILS 0 (L) 0.5 - 7.8 %    BASOPHILS 0 0.0 - 2.0 %    IMMATURE GRANULOCYTES 0 0.0 - 5.0 %    ABS. NEUTROPHILS 16.0 (H) 1.7 - 8.2 K/UL    ABS. LYMPHOCYTES 2.3 0.5 - 4.6 K/UL    ABS. MONOCYTES 2.4 (H) 0.1 - 1.3 K/UL    ABS. EOSINOPHILS 0.1 0.0 - 0.8 K/UL    ABS. BASOPHILS 0.1 0.0 - 0.2 K/UL    ABS. IMM.  GRANS. 0.1 0.0 - 0.5 K/UL   METABOLIC PANEL, COMPREHENSIVE    Collection Time: 03/03/22 12:43 PM   Result Value Ref Range    Sodium 134 (L) 138 - 145 mmol/L    Potassium 3.5 3.5 - 5.1 mmol/L    Chloride 100 98 - 107 mmol/L    CO2 25 21 - 32 mmol/L    Anion gap 9 7 - 16 mmol/L    Glucose 120 (H) 65 - 100 mg/dL    BUN 15 8 - 23 MG/DL    Creatinine 1.00 0.8 - 1.5 MG/DL    GFR est AA >60 >60 ml/min/1.73m2    GFR est non-AA >60 >60 ml/min/1.73m2    Calcium 9.0 8.3 - 10.4 MG/DL    Bilirubin, total 0.6 0.2 - 1.1 MG/DL    ALT (SGPT) 23 12 - 65 U/L    AST (SGOT) 13 (L) 15 - 37 U/L    Alk. phosphatase 69 50 - 136 U/L    Protein, total 7.5 6.3 - 8.2 g/dL    Albumin 3.4 3.2 - 4.6 g/dL    Globulin 4.1 (H) 2.3 - 3.5 g/dL    A-G Ratio 0.8 (L) 1.2 - 3.5     LACTIC ACID    Collection Time: 03/03/22 12:43 PM   Result Value Ref Range    Lactic acid 1.8 0.4 - 2.0 MMOL/L   TROPONIN-HIGH SENSITIVITY    Collection Time: 03/03/22 12:43 PM   Result Value Ref Range    Troponin-High Sensitivity 6.2 0 - 14 pg/mL   PROCALCITONIN    Collection Time: 03/03/22 12:43 PM   Result Value Ref Range    Procalcitonin 0.19 0.00 - 0.49 ng/mL   URINALYSIS W/ RFLX MICROSCOPIC    Collection Time: 03/03/22 12:52 PM   Result Value Ref Range    Color YELLOW      Appearance CLEAR      Specific gravity 1.009 1.001 - 1.023      pH (UA) 6.5 5.0 - 9.0      Protein Negative NEG mg/dL    Glucose Negative mg/dL    Ketone Negative NEG mg/dL    Bilirubin Negative NEG      Blood SMALL (A) NEG      Urobilinogen 0.2 0.2 - 1.0 EU/dL    Nitrites Negative NEG      Leukocyte Esterase SMALL (A) NEG      WBC 20-50 0 /hpf    RBC 5-10 0 /hpf    Epithelial cells 0 0 /hpf    Bacteria 1+ (H) 0 /hpf    Casts 0-3 0 /lpf   CULTURE, URINE    Collection Time: 03/03/22 12:52 PM    Specimen: Clean catch; Urine   Result Value Ref Range    Special Requests: NO SPECIAL REQUESTS      Culture result:        NO GROWTH AFTER SHORT PERIOD OF INCUBATION. FURTHER RESULTS TO FOLLOW AFTER OVERNIGHT INCUBATION.    CULTURE, BLOOD    Collection Time: 03/03/22  1:38 PM    Specimen: Blood   Result Value Ref Range    Special Requests: LEFT ANTECUBITAL      Culture result: NO GROWTH AFTER 15 HOURS     TROPONIN-HIGH SENSITIVITY    Collection Time: 03/03/22  3:16 PM   Result Value Ref Range    Troponin-High Sensitivity 11.7 0 - 14 pg/mL   LACTIC ACID    Collection Time: 03/03/22  3:16 PM   Result Value Ref Range    Lactic acid 0.8 0.4 - 2.0 MMOL/L   CULTURE, BLOOD    Collection Time: 03/03/22  5:08 PM    Specimen: Blood   Result Value Ref Range    Special Requests: LEFT  HAND        Culture result: NO GROWTH AFTER 12 HOURS     METABOLIC PANEL, COMPREHENSIVE    Collection Time: 03/04/22  3:41 AM   Result Value Ref Range    Sodium 139 136 - 145 mmol/L    Potassium 3.2 (L) 3.5 - 5.1 mmol/L    Chloride 107 98 - 107 mmol/L    CO2 21 21 - 32 mmol/L    Anion gap 11 7 - 16 mmol/L    Glucose 103 (H) 65 - 100 mg/dL    BUN 14 8 - 23 MG/DL    Creatinine 0.90 0.8 - 1.5 MG/DL    GFR est AA >60 >60 ml/min/1.73m2    GFR est non-AA >60 >60 ml/min/1.73m2    Calcium 8.1 (L) 8.3 - 10.4 MG/DL    Bilirubin, total 1.0 0.2 - 1.1 MG/DL    ALT (SGPT) 14 12 - 65 U/L    AST (SGOT) 19 15 - 37 U/L    Alk. phosphatase 57 50 - 136 U/L    Protein, total 5.9 (L) 6.3 - 8.2 g/dL    Albumin 2.5 (L) 3.2 - 4.6 g/dL    Globulin 3.4 2.3 - 3.5 g/dL    A-G Ratio 0.7 (L) 1.2 - 3.5     CBC WITH AUTOMATED DIFF    Collection Time: 03/04/22  3:41 AM   Result Value Ref Range    WBC 23.5 (H) 4.3 - 11.1 K/uL    RBC 3.41 (L) 4.23 - 5.6 M/uL    HGB 10.0 (L) 13.6 - 17.2 g/dL    HCT 30.4 (L) 41.1 - 50.3 %    MCV 89.1 79.6 - 97.8 FL    MCH 29.3 26.1 - 32.9 PG    MCHC 32.9 31.4 - 35.0 g/dL    RDW 13.9 11.9 - 14.6 %    PLATELET 493 588 - 221 K/uL    MPV 9.2 (L) 9.4 - 12.3 FL    ABSOLUTE NRBC 0.00 0.0 - 0.2 K/uL    DF AUTOMATED      NEUTROPHILS 81 (H) 43 - 78 %    LYMPHOCYTES 6 (L) 13 - 44 %    MONOCYTES 11 4.0 - 12.0 %    EOSINOPHILS 0 (L) 0.5 - 7.8 %    BASOPHILS 0 0.0 - 2.0 %    IMMATURE GRANULOCYTES 1 0.0 - 5.0 %    ABS. NEUTROPHILS 19.1 (H) 1.7 - 8.2 K/UL    ABS. LYMPHOCYTES 1.4 0.5 - 4.6 K/UL    ABS. MONOCYTES 2.7 (H) 0.1 - 1.3 K/UL    ABS. EOSINOPHILS 0.1 0.0 - 0.8 K/UL    ABS.  BASOPHILS 0.1 0.0 - 0.2 K/UL    ABS. IMM. GRANS. 0.3 0.0 - 0.5 K/UL       All Micro Results     Procedure Component Value Units Date/Time    CULTURE, URINE [[id number]] Collected: 03/03/22 1252    Order Status: Completed Specimen: Urine from Clean catch Updated: 03/04/22 0645     Special Requests: NO SPECIAL REQUESTS        Culture result:       NO GROWTH AFTER SHORT PERIOD OF INCUBATION. FURTHER RESULTS TO FOLLOW AFTER OVERNIGHT INCUBATION. CULTURE, BLOOD [[id number]] Collected: 03/03/22 1338    Order Status: Completed Specimen: Blood Updated: 03/04/22 0622     Special Requests: LEFT ANTECUBITAL        Culture result: NO GROWTH AFTER 15 HOURS       CULTURE, BLOOD [[id number]] Collected: 03/03/22 1708    Order Status: Completed Specimen: Blood Updated: 03/04/22 0622     Special Requests: --        LEFT  HAND       Culture result: NO GROWTH AFTER 12 HOURS             Other Studies:  CT ABD PELV W CONT    Result Date: 3/3/2022  CT ABDOMEN AND PELVIS WITH CONTRAST HISTORY:  75-year-old male patient presenting to the ER with reports of generalized ill feeling, inability urinate, lightheadedness and near syncopal episode. Patient reports feeling dizzy and lightheaded -and shaky all over. TECHNIQUE: Helically acquired images were obtained from the domes of the diaphragms to the ischial tuberosities reconstructed at 5mm intervals after the uneventful administration of 100c's of intravenous Isovue-370 in order to better evaluate the solid abdominal viscera and vascular structures. Oral contrast was not administered per the emergency imaging BMI protocol. Coronal and sagittal reformatted images were submitted. Radiation dose reduction techniques were used for this study:  Our CT scanners use one or all of the following: Automated exposure control, adjustment of the mA and/or kVp according to patient's size, iterative reconstruction.  COMPARISON: 11/05/2015 CT ABDOMEN: There is mild dependent atelectatic changes within the lower lobes posteriorly. Stable low-density lesions are present within the liver with the larger most compatible with a cyst measuring up to 1.1 cm. There is a 9 mm stone within the gallbladder neck, unchanged. There is a stable right adrenal gland nodule measuring 2.2 cm. The pancreas is unremarkable. There is a 1.5 cm cyst at the midpole right kidney. There is a punctate nonobstructing calculus at the midpole right kidney. No adenopathy or ascites is present. There are no inflammatory changes. Atherosclerotic changes are present involving the abdominal aorta. There are postoperative changes of the lumbar spine status post posterior decompression from at L2, L3 and L5. No suspicious gas and fluid collection is identified CT PELVIS: No adenopathy or ascites is present. There are no inflammatory changes. A Briseno catheter is present within the urinary bladder. The prostate gland is enlarged measuring approximately 4.6 x 6.5 x 6.0 cm in AP, transverse and craniocaudal dimensions, respectively. No aggressive osseous lesion is present. There is a mild fecal impaction in the rectum. 1. Enlarged prostate gland. 2. Cholelithiasis. 3. Stable right adrenal gland mass. 4. Postoperative changes of the lumbar spine without definite fluid collection/abscess identified. XR CHEST PORT    Result Date: 3/3/2022  Portable chest: History: Severe SOB Comparison: None Findings: A single view of the chest was obtained at 12:57 PM. The cardiac silhouette is normal in size and configuration. The lungs and pleural spaces are clear. The pulmonary vascularity is within normal limits. Unremarkable portable chest radiograph.        Current Meds:  Current Facility-Administered Medications   Medication Dose Route Frequency    [START ON 3/5/2022] vancomycin random level reminder   Other ONCE    potassium chloride (K-DUR, KLOR-CON M20) SR tablet 40 mEq  40 mEq Oral BID    piperacillin-tazobactam (ZOSYN) 4.5 g in 0.9% sodium chloride (MBP/ADV) 100 mL MBP  4.5 g IntraVENous Q8H    sodium chloride (NS) flush 5-40 mL  5-40 mL IntraVENous Q8H    sodium chloride (NS) flush 5-40 mL  5-40 mL IntraVENous PRN    enoxaparin (LOVENOX) injection 40 mg  40 mg SubCUTAneous Q24H    tamsulosin (FLOMAX) capsule 0.4 mg  0.4 mg Oral DAILY    [Held by provider] amLODIPine (NORVASC) tablet 5 mg  5 mg Oral DAILY    aspirin delayed-release tablet 81 mg  81 mg Oral DAILY    cyanocobalamin tablet 1,000 mcg  1,000 mcg Oral DAILY    ezetimibe (ZETIA) tablet 10 mg  10 mg Oral DAILY    levothyroxine (SYNTHROID) tablet 50 mcg  50 mcg Oral ACB    [Held by provider] lisinopril-hydroCHLOROthiazide (PRINZIDE, ZESTORETIC) 20-12.5 mg per tablet 1 Tablet  1 Tablet Oral DAILY    pantoprazole (PROTONIX) tablet 40 mg  40 mg Oral ACB    potassium chloride (K-DUR, KLOR-CON M20) SR tablet 20 mEq  20 mEq Oral DAILY    pregabalin (LYRICA) capsule 225 mg  225 mg Oral QHS    rosuvastatin (CRESTOR) tablet 20 mg  20 mg Oral DAILY    sodium chloride (NS) flush 5-40 mL  5-40 mL IntraVENous Q8H    sodium chloride (NS) flush 5-40 mL  5-40 mL IntraVENous PRN    acetaminophen (TYLENOL) tablet 650 mg  650 mg Oral Q6H PRN    Or    acetaminophen (TYLENOL) suppository 650 mg  650 mg Rectal Q6H PRN    polyethylene glycol (MIRALAX) packet 17 g  17 g Oral DAILY PRN    ondansetron (ZOFRAN ODT) tablet 4 mg  4 mg Oral Q8H PRN    Or    ondansetron (ZOFRAN) injection 4 mg  4 mg IntraVENous Q6H PRN    vancomycin (VANCOCIN) 750 mg in 0.9% sodium chloride 250 mL ([id number])  750 mg IntraVENous Q12H    temazepam (RESTORIL) capsule 15 mg  15 mg Oral QHS PRN       Signed:  Kimi Aguilar MD    Part of this note may have been written by using a voice dictation software. The note has been proof read but may still contain some grammatical/other typographical errors.

## 2022-03-04 NOTE — PROGRESS NOTES
Patient vomited when RN administered lyrica; this RN notified Dr. Niurka Cid. Patient also anxious and shaking at this time ; MD also notified of the above statement. MD placed orders at this time.

## 2022-03-05 VITALS
RESPIRATION RATE: 18 BRPM | SYSTOLIC BLOOD PRESSURE: 137 MMHG | DIASTOLIC BLOOD PRESSURE: 66 MMHG | OXYGEN SATURATION: 97 % | TEMPERATURE: 98.2 F | HEART RATE: 74 BPM

## 2022-03-05 LAB
ACC. NO. FROM MICRO ORDER, ACCP: ABNORMAL
ANION GAP SERPL CALC-SCNC: 7 MMOL/L (ref 7–16)
BASOPHILS # BLD: 0.1 K/UL (ref 0–0.2)
BASOPHILS NFR BLD: 0 % (ref 0–2)
BUN SERPL-MCNC: 13 MG/DL (ref 8–23)
CALCIUM SERPL-MCNC: 8.2 MG/DL (ref 8.3–10.4)
CHLORIDE SERPL-SCNC: 106 MMOL/L (ref 98–107)
CO2 SERPL-SCNC: 23 MMOL/L (ref 21–32)
CREAT SERPL-MCNC: 0.8 MG/DL (ref 0.8–1.5)
DIFFERENTIAL METHOD BLD: ABNORMAL
EOSINOPHIL # BLD: 0.2 K/UL (ref 0–0.8)
EOSINOPHIL NFR BLD: 1 % (ref 0.5–7.8)
ERYTHROCYTE [DISTWIDTH] IN BLOOD BY AUTOMATED COUNT: 14 % (ref 11.9–14.6)
GLUCOSE SERPL-MCNC: 118 MG/DL (ref 65–100)
HCT VFR BLD AUTO: 28.6 % (ref 41.1–50.3)
HGB BLD-MCNC: 9.4 G/DL (ref 13.6–17.2)
IMM GRANULOCYTES # BLD AUTO: 0.3 K/UL (ref 0–0.5)
IMM GRANULOCYTES NFR BLD AUTO: 1 % (ref 0–5)
INTERPRETATION: ABNORMAL
KPC (CARBAPENEM RESISTANCE GENE): NOT DETECTED
LYMPHOCYTES # BLD: 1.6 K/UL (ref 0.5–4.6)
LYMPHOCYTES NFR BLD: 8 % (ref 13–44)
MCH RBC QN AUTO: 29.3 PG (ref 26.1–32.9)
MCHC RBC AUTO-ENTMCNC: 32.9 G/DL (ref 31.4–35)
MCV RBC AUTO: 89.1 FL (ref 79.6–97.8)
MONOCYTES # BLD: 2.2 K/UL (ref 0.1–1.3)
MONOCYTES NFR BLD: 11 % (ref 4–12)
NEUTS SEG # BLD: 15.4 K/UL (ref 1.7–8.2)
NEUTS SEG NFR BLD: 78 % (ref 43–78)
NRBC # BLD: 0 K/UL (ref 0–0.2)
PLATELET # BLD AUTO: 312 K/UL (ref 150–450)
PMV BLD AUTO: 9.3 FL (ref 9.4–12.3)
POTASSIUM SERPL-SCNC: 3.6 MMOL/L (ref 3.5–5.1)
PSEUDOMONAS AERUGINOSA: DETECTED
RBC # BLD AUTO: 3.21 M/UL (ref 4.23–5.6)
SODIUM SERPL-SCNC: 136 MMOL/L (ref 138–145)
VANCOMYCIN SERPL-MCNC: 7.1 UG/ML
WBC # BLD AUTO: 19.7 K/UL (ref 4.3–11.1)

## 2022-03-05 PROCEDURE — 97161 PT EVAL LOW COMPLEX 20 MIN: CPT

## 2022-03-05 PROCEDURE — 36415 COLL VENOUS BLD VENIPUNCTURE: CPT

## 2022-03-05 PROCEDURE — 80202 ASSAY OF VANCOMYCIN: CPT

## 2022-03-05 PROCEDURE — 80048 BASIC METABOLIC PNL TOTAL CA: CPT

## 2022-03-05 PROCEDURE — 74011250637 HC RX REV CODE- 250/637: Performed by: HOSPITALIST

## 2022-03-05 PROCEDURE — 74011000258 HC RX REV CODE- 258: Performed by: INTERNAL MEDICINE

## 2022-03-05 PROCEDURE — 65270000029 HC RM PRIVATE

## 2022-03-05 PROCEDURE — 74011250637 HC RX REV CODE- 250/637: Performed by: FAMILY MEDICINE

## 2022-03-05 PROCEDURE — 85025 COMPLETE CBC W/AUTO DIFF WBC: CPT

## 2022-03-05 PROCEDURE — 74011000250 HC RX REV CODE- 250: Performed by: INTERNAL MEDICINE

## 2022-03-05 PROCEDURE — 74011250636 HC RX REV CODE- 250/636: Performed by: INTERNAL MEDICINE

## 2022-03-05 PROCEDURE — 74011250636 HC RX REV CODE- 250/636: Performed by: FAMILY MEDICINE

## 2022-03-05 PROCEDURE — 74011250637 HC RX REV CODE- 250/637: Performed by: INTERNAL MEDICINE

## 2022-03-05 PROCEDURE — 87150 DNA/RNA AMPLIFIED PROBE: CPT

## 2022-03-05 PROCEDURE — 97530 THERAPEUTIC ACTIVITIES: CPT

## 2022-03-05 RX ORDER — SIMETHICONE 80 MG
80 TABLET,CHEWABLE ORAL
Status: DISCONTINUED | OUTPATIENT
Start: 2022-03-05 | End: 2022-03-08 | Stop reason: HOSPADM

## 2022-03-05 RX ADMIN — SODIUM CHLORIDE, PRESERVATIVE FREE 10 ML: 5 INJECTION INTRAVENOUS at 21:29

## 2022-03-05 RX ADMIN — MORPHINE SULFATE 2 MG: 4 INJECTION INTRAVENOUS at 22:32

## 2022-03-05 RX ADMIN — EZETIMIBE 10 MG: 10 TABLET ORAL at 08:45

## 2022-03-05 RX ADMIN — ACETAMINOPHEN 650 MG: 325 TABLET ORAL at 21:28

## 2022-03-05 RX ADMIN — PREGABALIN 225 MG: 150 CAPSULE ORAL at 21:28

## 2022-03-05 RX ADMIN — ROSUVASTATIN CALCIUM 20 MG: 20 TABLET, FILM COATED ORAL at 08:45

## 2022-03-05 RX ADMIN — LEVOTHYROXINE SODIUM 50 MCG: 0.05 TABLET ORAL at 05:19

## 2022-03-05 RX ADMIN — TEMAZEPAM 15 MG: 15 CAPSULE ORAL at 21:28

## 2022-03-05 RX ADMIN — SIMETHICONE 80 MG: 80 TABLET, CHEWABLE ORAL at 08:45

## 2022-03-05 RX ADMIN — PIPERACILLIN SODIUM AND TAZOBACTAM SODIUM 4.5 G: 4; .5 INJECTION, POWDER, LYOPHILIZED, FOR SOLUTION INTRAVENOUS at 17:09

## 2022-03-05 RX ADMIN — SODIUM CHLORIDE, PRESERVATIVE FREE 10 ML: 5 INJECTION INTRAVENOUS at 05:20

## 2022-03-05 RX ADMIN — SODIUM CHLORIDE, PRESERVATIVE FREE 10 ML: 5 INJECTION INTRAVENOUS at 05:25

## 2022-03-05 RX ADMIN — ACETAMINOPHEN 650 MG: 325 TABLET ORAL at 15:07

## 2022-03-05 RX ADMIN — ACETAMINOPHEN 650 MG: 325 TABLET ORAL at 07:30

## 2022-03-05 RX ADMIN — PIPERACILLIN SODIUM AND TAZOBACTAM SODIUM 4.5 G: 4; .5 INJECTION, POWDER, LYOPHILIZED, FOR SOLUTION INTRAVENOUS at 02:12

## 2022-03-05 RX ADMIN — VANCOMYCIN HYDROCHLORIDE 750 MG: 750 INJECTION, POWDER, LYOPHILIZED, FOR SOLUTION INTRAVENOUS at 05:18

## 2022-03-05 RX ADMIN — TAMSULOSIN HYDROCHLORIDE 0.4 MG: 0.4 CAPSULE ORAL at 08:45

## 2022-03-05 RX ADMIN — MORPHINE SULFATE 2 MG: 4 INJECTION INTRAVENOUS at 15:07

## 2022-03-05 RX ADMIN — PANTOPRAZOLE SODIUM 40 MG: 40 TABLET, DELAYED RELEASE ORAL at 05:19

## 2022-03-05 RX ADMIN — CYANOCOBALAMIN TAB 1000 MCG 1000 MCG: 1000 TAB at 08:45

## 2022-03-05 RX ADMIN — PIPERACILLIN SODIUM AND TAZOBACTAM SODIUM 4.5 G: 4; .5 INJECTION, POWDER, LYOPHILIZED, FOR SOLUTION INTRAVENOUS at 08:45

## 2022-03-05 RX ADMIN — ENOXAPARIN SODIUM 40 MG: 100 INJECTION SUBCUTANEOUS at 17:09

## 2022-03-05 RX ADMIN — SODIUM CHLORIDE, PRESERVATIVE FREE 10 ML: 5 INJECTION INTRAVENOUS at 21:28

## 2022-03-05 RX ADMIN — POTASSIUM CHLORIDE 20 MEQ: 20 TABLET, EXTENDED RELEASE ORAL at 08:46

## 2022-03-05 RX ADMIN — SODIUM CHLORIDE, PRESERVATIVE FREE 10 ML: 5 INJECTION INTRAVENOUS at 05:18

## 2022-03-05 RX ADMIN — POTASSIUM CHLORIDE 40 MEQ: 20 TABLET, EXTENDED RELEASE ORAL at 08:46

## 2022-03-05 RX ADMIN — ASPIRIN 81 MG: 81 TABLET ORAL at 08:45

## 2022-03-05 NOTE — PROGRESS NOTES
Assumed care of this patient. Patient sitting on the side of the bed. Patient A&Ox4. On room air. Medicated with tylenol due to pain (pain scale in the computer). Patient also c/o of excessive gas that he can't seem to gete rid of, and he can't walk around much due to his bladder pain. Perfect served Dr. Tana Vee and asked if I could get some gas-x, she said yes. Ordered. Patient states he still feels weak this AM. Less shaky this AM than yesterday. Belongings and call light within reach. Will monitor.

## 2022-03-05 NOTE — PROGRESS NOTES
ACUTE PHYSICAL THERAPY GOALS:  (Developed with and agreed upon by patient and/or caregiver.)  STG:  (1.)Mr. Cristopher Rincon will move from supine to sit and sit to supine , scoot up and down and roll side to side with STAND BY ASSIST within 4 treatment day(s). (2.)Mr. Cristopher Rincon will transfer from bed to chair and chair to bed with STAND BY ASSIST using the least restrictive device within 4 treatment day(s). (3.)Mr. Cristopher Rincon will ambulate with STAND BY ASSIST for 100 feet with the least restrictive device within 4 treatment day(s). LTG:  (1.)Mr. Cristopher Rincon will move from supine to sit and sit to supine , scoot up and down and roll side to side in bed with MODIFIED INDEPENDENCE within 7 treatment day(s). (2.)Mr. Cristopher Rincon will transfer from bed to chair and chair to bed with MODIFIED INDEPENDENCE using the least restrictive device within 7 treatment day(s). (3.)Mr. Cristopher Rincon will ambulate with MODIFIED INDEPENDENCE for 200 feet with the least restrictive device within 7 treatment day(s).   ________________________________________________________________________________________________    PHYSICAL THERAPY ASSESSMENT: Initial Assessment and PM PT Treatment Day Jimy Collins is a 68 y.o. male   PRIMARY DIAGNOSIS: Sepsis ([facility] [address]  Sepsis ([facility] [address] [A41.9]       Reason for Referral:    ICD-10: Treatment Diagnosis: Generalized Muscle Weakness (M62.81)  Other lack of cordination (R27.8)  Difficulty in walking, Not elsewhere classified (R26.2)  Other abnormalities of gait and mobility (R26.89)  [place]PAT[place] / [place] / Product Type: Medicare /     ASSESSMENT:     REHAB RECOMMENDATIONS:   Recommendation to date pending progress:  [address] [place] [address]  Equipment:    To Be Determined     PRIOR LEVEL OF FUNCTION:  (Prior to Hospitalization) INITIAL/CURRENT LEVEL OF FUNCTION:  (Most Recently Demonstrated)   Bed Mobility:   Independent  Sit to Stand:   Independent  Transfers:   Independent  Gait/Mobility:   Independent Bed Mobility:   Contact Guard Assistance  Sit to Stand:   Contact Guard Assistance  Transfers:   Contact Guard Assistance  Gait/Mobility:   Contact Guard Assistance     ASSESSMENT:  Mr. Josef Rodrigues is a pleasant elderly male with above diagnosis who demonstrates with decreased transfers, ambulation and mobility below his prior functional baseline. All transfers are currently limited at [address] x 1 out of bed to sit and stand with febrile type shakes this afternoon followed by ambulation with 2 wheel rolling walker for 5 feet with the deficits stated below and fair balance. Mary Loco then returns to seated edge of bed before return to supine with CGA x 1. Skilled PT is indicated for this patient's functional mobility deficits. SUBJECTIVE:   Mr. Josef Rodrigues states, \"I am feeling feverish. \"  Temperature check is 99.5 F.        SOCIAL HISTORY/LIVING ENVIRONMENT:   Home Environment: Independent living  One/Two Story Residence: Two story  Living Alone: No  Support Systems: Spouse/Significant Other,Child(madeline)  OBJECTIVE:     PAIN: VITAL SIGNS: LINES/DRAINS:   Pre Treatment: Pain Screen  Pain Scale 1: Numeric (0 - 10)  Pain Intensity 1: 0  Post Treatment: 0/10    IV  O2 Device: None (Room air)     GROSS EVALUATION:   Within Functional Limits Abnormal/ Functional Abnormal/ Non-Functional (see comments) Not Tested Comments:   AROM [] [x] [] []    PROM [] [x] [] []    Strength [] [x] [] []    Balance [] [x] [] []    Posture [] [x] [] []    Sensation [] [x] [] []    Coordination [] [x] [] []    Tone [] [x] [] []    Edema [x] [] [] []    Activity Tolerance [] [x] [] []     [] [] [] []      COGNITION/  PERCEPTION: Intact Impaired   (see comments) Comments:   Orientation [x] []    Vision [x] []    Hearing [x] []    Command Following [x] []    Safety Awareness [x] []     [] []      MOBILITY: I Mod I S SBA CGA Min Mod Max Total  NT x2 Comments:   Bed Mobility    Rolling [] [] [] [] [x] [] [] [] [] [] []    Supine to Sit [] [] [] [] [x] [] [] [] [] [] []    Scooting [] [] [] [] [x] [] [] [] [] [] []    Sit to Supine [] [] [] [] [x] [] [] [] [] [] []    Transfers    Sit to Stand [] [] [] [] [x] [] [] [] [] [] []    Bed to Chair [] [] [] [] [x] [] [] [] [] [] []    Stand to Sit [] [] [] [] [x] [] [] [] [] [] []    I=Independent, Mod I=Modified Independent, S=Supervision, SBA=Standby Assistance, CGA=Contact Guard Assistance,   Min=Minimal Assistance, Mod=Moderate Assistance, Max=Maximal Assistance, Total=Total Assistance, NT=Not Tested  GAIT: I Mod I S SBA CGA Min Mod Max Total  NT x2 Comments:   Level of Assistance [] [] [] [] [x] [] [] [] [] [] [] Febrile type shakes today . Distance 5 feet     DME Rolling Walker    Gait Quality Fair     Weightbearing Status WBAT     I=Independent, Mod I=Modified Independent, S=Supervision, SBA=Standby Assistance, CGA=Contact Guard Assistance,   Min=Minimal Assistance, Mod=Moderate Assistance, Max=Maximal Assistance, Total=Total Assistance, NT=Not Tested    [facility]       How much difficulty does the patient currently have. .. Unable A Lot A Little None   1. Turning over in bed (including adjusting bedclothes, sheets and blankets)? [] 1   [] 2   [x] 3   [] 4   2. Sitting down on and standing up from a chair with arms ( e.g., wheelchair, bedside commode, etc.)   [] 1   [] 2   [x] 3   [] 4   3. Moving from lying on back to sitting on the side of the bed? [] 1   [] 2   [x] 3   [] 4   How much help from another person does the patient currently need. .. Total A Lot A Little None   4. Moving to and from a bed to a chair (including a wheelchair)? [] 1   [] 2   [x] 3   [] 4   5. Need to walk in hospital room? [] 1   [] 2   [x] 3   [] 4   6. Climbing 3-5 steps with a railing?    [] 1   [] 2   [x] 3   [] 4   © 2007, Trustees of [facility], under license to [employer]. All rights reserved     Score:  Initial: 18 Most Recent: X (Date: -- )    Interpretation of Tool:  Represents activities that are increasingly more difficult (i.e. Bed mobility, Transfers, Gait). PLAN:   FREQUENCY/DURATION: PT Plan of Care: 3 times/week for duration of hospital stay or until stated goals are met, whichever comes first.    PROBLEM LIST:   (Skilled intervention is medically necessary to address:)  1. Decreased Activity Tolerance  2. Decreased AROM/PROM  3. Decreased Balance  4. Decreased Cognition  5. Decreased Coordination  6. Decreased Gait Ability  7. Decreased Strength  8. Decreased Transfer Abilities   INTERVENTIONS PLANNED:   (Benefits and precautions of physical therapy have been discussed with the patient.)  1. Therapeutic Activity  2. Therapeutic Exercise/HEP  3. Neuromuscular Re-education  4. Gait Training  5. Manual Therapy  6. Education     TREATMENT:     EVALUATION: Low Complexity : (Untimed Charge)    TREATMENT:   ($$ Therapeutic Activity: 23-37 mins    )  Therapeutic Activity (23 Minutes): Therapeutic activity included Rolling, Supine to Sit, Sit to Supine, Scooting, Lateral Scooting, Transfer Training, Ambulation on level ground, Sitting balance  and Standing balance to improve functional Mobility, Strength and Activity tolerance.     TREATMENT GRID:   Date:   Date:   Date:     Activity/Exercise Parameters Parameters Parameters                                                   AFTER TREATMENT POSITION/PRECAUTIONS:  Bed, Needs within reach and RN notified    INTERDISCIPLINARY COLLABORATION:  RN/PCT and PT/PTA    TOTAL TREATMENT DURATION:  PT Patient Time In/Time Out  Time In: 2153  Time Out: [place], [place]

## 2022-03-05 NOTE — PROGRESS NOTES
Physician Progress Note      PATIENT:               Theresa Sifuentes  CSN #:                  [id number]  :                       1945  ADMIT DATE:       3/3/2022 12:30 PM  [address] [place] DATE:  RESPONDING  PROVIDER #:        Cm Florez MD          QUERY TEXT:    Pt admitted with fatigue, dizziness, near syncopal event. Pt noted to have Sepsis and UTI. Kidd left in place after recent back surgery. If possible, please document in progress notes and discharge summary the relationship, if any, between Sepsis/UTI  and kidd catheter . The medical record reflects the following:  Risk Factors: Recent back surgery discharged with kdid in place. Clinical Indicators: kidd removed yesterday at urology office. fatigue, weakness, hypotension. wbc 23, temp 102.5, unable to void--catheter replaced in ER. bp systolic 89'D, near syncopal episode. ua-+1 bacteria, wbc 20-50, small LE. Treatment: iv vanc/zosyn, labs, cultures, replacement of kidd, fluids, xray, essential home meds. Options provided:  -- Sepsis/UTI due to kidd catheter  -- Sepsis/UTI unrelated to kidd catheter  -- Other - I will add my own diagnosis  -- Disagree - Not applicable / Not valid  -- Disagree - Clinically unable to determine / Unknown  -- Refer to Clinical Documentation Reviewer    PROVIDER RESPONSE TEXT:    This patient has Sepsis/UTI due to kidd catheter.     Query created by: Moisés Bryant on 3/4/2022 10:15 AM      Electronically signed by:  Cm Florez MD 3/5/2022 7:17 AM

## 2022-03-05 NOTE — PROGRESS NOTES
Quiet overnight for patient. He has been observed during hourly rounding and has slept at short intervals per his report. He awakens easily and has denied needs to this nurse. He will continue to be assisted as needed and will prepare to give report to oncoming nurse. Continues on room air at this time.

## 2022-03-05 NOTE — PROGRESS NOTES
Hospitalist Progress Note   Admit Date:  3/3/2022 12:30 PM   Name:  Sara Krishna   Age:  68 y.o. Sex:  male  :  1945   MRN:  [id number]   Room:  807/    Presenting Complaint: No chief complaint on file. Reason(s) for Admission: Sepsis [facility]) [A41.9]     Hospital Course & Interval History:   Sara Krishna is a 68 y.o. male with medical history of HLP, BPH, HTN, GERD, hypothyroidism, CAD who presented with report of fevers, rigors, weakness and relative hypotension with presyncope episode. Pt s/p L2-S1 laminectomy 2/10/22 and was discharged home with Briseno catheter due to urinary tension. Patient admitted with sepsis secondary to UTI and started on empiric broad-spectrum antibiotics. Blood culture and urine culture growing gram-negative rods. Subjective/24hr Events (22): Patient is seen at the bedside. Reports feeling better today. T-max 100.3. Reports shivering improving but still very weak. Denies chest pain, palpitation, nausea, vomiting or abdominal pain. Blood culture growing gram-negative rods. ROS:  10 systems reviewed and negative except as noted above. Assessment & Plan:     # Sepsis secondary to CAP UTI  - empiric vanco/zosyn  - Follow for blood and urine culture  3/5: Pending urine culture growing gram-negative rods, ID panel Pseudomonas aeruginosa. Discontinue vancomycin and continue Zosyn. Repeat blood culture tomorrow a.m. # Urinary retention  - Briseno re-inserted in ED  - Continue Flomax  - Consult to urology  3/5: Urology recommend continue Briseno on discharge. Plan for cystoscopy after discharge. Patient to follow-up outpatient. # Lumbar stenosis s/p l2-s1 laminectomy 2/10  - Incision site well healed.  No focal tenderness and no overt fluid collection or abscess on CT  - doubt postoperative spine infection  - continue lyrica     # HLP  - Statin     # Hypothyroidism  - Synthroid     # HTN  - Holding home meds due to borderline BP, resume as appropriate     # Kym Sprain  - PPI     # CAD  - continue asa/statin      Dispo/Discharge Planning:  TBD, PT/OT    Diet:  ADULT DIET Regular; Low Sodium (2 gm)  DVT PPx: Lovenox  Code status: Full Code    Hospital Problems as of 3/5/2022 Date Reviewed: 3/3/2022          Codes Class Noted - Resolved POA    * (Principal) Sepsis ([facility] [address] ICD-10-CM: A41.9  ICD-9-CM: 038.9, 995.91  3/3/2022 - Present Unknown        Spinal stenosis of lumbar region at multiple levels ICD-10-CM: M48.061  ICD-9-CM: 724.02  2/10/2022 - Present Yes        Coronary artery disease due to calcified coronary lesion ICD-10-CM: I25.10, I25.84  ICD-9-CM: 414.00, 414.4  11/12/2020 - Present Yes        Essential hypertension ICD-10-CM: I10  ICD-9-CM: 401.9  9/24/2020 - Present Yes        Mixed hyperlipidemia ICD-10-CM: E78.2  ICD-9-CM: 272.2  5/16/2016 - Present Yes        Hypothyroidism due to acquired atrophy of thyroid ICD-10-CM: E03.4  ICD-9-CM: 244.8, 246.8  4/15/2015 - Present Yes              Objective:     Patient Vitals for the past 24 hrs:   Temp Pulse Resp BP SpO2   03/05/22 1056 98.5 °F (36.9 °C) 72 16 115/61 95 %   03/05/22 0736 99.4 °F (37.4 °C) 73 17 123/66 95 %   03/05/22 0336 99.7 °F (37.6 °C) 63 19 (!) 121/59 96 %   03/04/22 2319 100.1 °F (37.8 °C) 80 19 127/71 95 %   03/04/22 1954 98.1 °F (36.7 °C) 76 20 111/66 96 %   03/04/22 1514 100.3 °F (37.9 °C) 84 18 112/60 95 %     Oxygen Therapy  O2 Sat (%): 95 % (03/05/22 1056)  Pulse via Oximetry: 106 beats per minute (03/03/22 1515)  O2 Device: None (Room air) (03/05/22 7821)    Estimated body mass index is 26.83 kg/m² as calculated from the following:    Height as of 2/2/22: 6' 2\" (1.88 m). Weight as of this encounter: 94.8 kg (208 lb 15.9 oz).     Intake/Output Summary (Last 24 hours) at 3/5/2022 1402  Last data filed at 3/5/2022 0336  Gross per 24 hour   Intake 320 ml   Output 550 ml   Net -230 ml         Physical Exam:     Blood pressure 115/61, pulse 72, temperature 98.5 °F (36.9 °C), resp. rate 16, weight 94.8 kg (208 lb 15.9 oz), SpO2 95 %. General:    No overt distress  Head:  Normocephalic, atraumatic  Eyes:  Sclerae appear normal.  Pupils equally round. ENT:  Nares appear normal, no drainage. Moist oral mucosa  Neck:  No restricted ROM. Trachea midline   CV:   RRR. No m/r/g. No jugular venous distension. Lungs:   CTAB. No wheezing, rhonchi, or rales. Respirations even, unlabored  Abdomen: Bowel sounds present. Soft, nontender, nondistended. Extremities: No cyanosis or clubbing. No edema  Skin:     No rashes and normal coloration. Warm and dry. Neuro:  CN II-XII grossly intact. Sensation intact. A&Ox3  Psych:  Normal mood and affect. I have reviewed ordered lab tests and independently visualized imaging below:    Recent Labs:  Recent Results (from the past 48 hour(s))   TROPONIN-HIGH SENSITIVITY    Collection Time: 03/03/22  3:16 PM   Result Value Ref Range    Troponin-High Sensitivity 11.7 0 - 14 pg/mL   LACTIC ACID    Collection Time: 03/03/22  3:16 PM   Result Value Ref Range    Lactic acid 0.8 0.4 - 2.0 MMOL/L   CULTURE, BLOOD    Collection Time: 03/03/22  5:08 PM    Specimen: Blood   Result Value Ref Range    Special Requests: LEFT  HAND        Culture result: NO GROWTH 2 DAYS     METABOLIC PANEL, COMPREHENSIVE    Collection Time: 03/04/22  3:41 AM   Result Value Ref Range    Sodium 139 136 - 145 mmol/L    Potassium 3.2 (L) 3.5 - 5.1 mmol/L    Chloride 107 98 - 107 mmol/L    CO2 21 21 - 32 mmol/L    Anion gap 11 7 - 16 mmol/L    Glucose 103 (H) 65 - 100 mg/dL    BUN 14 8 - 23 MG/DL    Creatinine 0.90 0.8 - 1.5 MG/DL    GFR est AA >60 >60 ml/min/1.73m2    GFR est non-AA >60 >60 ml/min/1.73m2    Calcium 8.1 (L) 8.3 - 10.4 MG/DL    Bilirubin, total 1.0 0.2 - 1.1 MG/DL    ALT (SGPT) 14 12 - 65 U/L    AST (SGOT) 19 15 - 37 U/L    Alk.  phosphatase 57 50 - 136 U/L    Protein, total 5.9 (L) 6.3 - 8.2 g/dL    Albumin 2.5 (L) 3.2 - 4.6 g/dL    Globulin 3.4 2.3 - 3.5 g/dL    A-G Ratio 0.7 (L) 1.2 - 3.5     CBC WITH AUTOMATED DIFF    Collection Time: 03/04/22  3:41 AM   Result Value Ref Range    WBC 23.5 (H) 4.3 - 11.1 K/uL    RBC 3.41 (L) 4.23 - 5.6 M/uL    HGB 10.0 (L) 13.6 - 17.2 g/dL    HCT 30.4 (L) 41.1 - 50.3 %    MCV 89.1 79.6 - 97.8 FL    MCH 29.3 26.1 - 32.9 PG    MCHC 32.9 31.4 - 35.0 g/dL    RDW 13.9 11.9 - 14.6 %    PLATELET 725 249 - 406 K/uL    MPV 9.2 (L) 9.4 - 12.3 FL    ABSOLUTE NRBC 0.00 0.0 - 0.2 K/uL    DF AUTOMATED      NEUTROPHILS 81 (H) 43 - 78 %    LYMPHOCYTES 6 (L) 13 - 44 %    MONOCYTES 11 4.0 - 12.0 %    EOSINOPHILS 0 (L) 0.5 - 7.8 %    BASOPHILS 0 0.0 - 2.0 %    IMMATURE GRANULOCYTES 1 0.0 - 5.0 %    ABS. NEUTROPHILS 19.1 (H) 1.7 - 8.2 K/UL    ABS. LYMPHOCYTES 1.4 0.5 - 4.6 K/UL    ABS. MONOCYTES 2.7 (H) 0.1 - 1.3 K/UL    ABS. EOSINOPHILS 0.1 0.0 - 0.8 K/UL    ABS. BASOPHILS 0.1 0.0 - 0.2 K/UL    ABS. IMM. GRANS. 0.3 0.0 - 0.5 K/UL   BLOOD CULTURE ID PANEL    Collection Time: 03/05/22  3:20 AM    Specimen: Blood   Result Value Ref Range    Acc. no. from Micro Order [id number]     Pseudomonas aeruginosa Detected (A) NOTDET      KPC (Carbapenem Resistance Gene) NOT DETECTED NOTDET      INTERPRETATION        Gram negative craig. Identified by realtime PCR as Pseudomonas aeruginosa.    VANCOMYCIN, RANDOM    Collection Time: 03/05/22  3:38 AM   Result Value Ref Range    Vancomycin, random 7.1 UG/ML   CBC WITH AUTOMATED DIFF    Collection Time: 03/05/22 11:30 AM   Result Value Ref Range    WBC 19.7 (H) 4.3 - 11.1 K/uL    RBC 3.21 (L) 4.23 - 5.6 M/uL    HGB 9.4 (L) 13.6 - 17.2 g/dL    HCT 28.6 (L) 41.1 - 50.3 %    MCV 89.1 79.6 - 97.8 FL    MCH 29.3 26.1 - 32.9 PG    MCHC 32.9 31.4 - 35.0 g/dL    RDW 14.0 11.9 - 14.6 %    PLATELET 516 916 - 218 K/uL    MPV 9.3 (L) 9.4 - 12.3 FL    ABSOLUTE NRBC 0.00 0.0 - 0.2 K/uL    DF AUTOMATED      NEUTROPHILS 78 43 - 78 %    LYMPHOCYTES 8 (L) 13 - 44 %    MONOCYTES 11 4.0 - 12.0 %    EOSINOPHILS 1 0.5 - 7.8 %    BASOPHILS 0 0.0 - 2.0 %    IMMATURE GRANULOCYTES 1 0.0 - 5.0 %    ABS. NEUTROPHILS 15.4 (H) 1.7 - 8.2 K/UL    ABS. LYMPHOCYTES 1.6 0.5 - 4.6 K/UL    ABS. MONOCYTES 2.2 (H) 0.1 - 1.3 K/UL    ABS. EOSINOPHILS 0.2 0.0 - 0.8 K/UL    ABS. BASOPHILS 0.1 0.0 - 0.2 K/UL    ABS. IMM. GRANS. 0.3 0.0 - 0.5 K/UL   METABOLIC PANEL, BASIC    Collection Time: 03/05/22 11:30 AM   Result Value Ref Range    Sodium 136 (L) 138 - 145 mmol/L    Potassium 3.6 3.5 - 5.1 mmol/L    Chloride 106 98 - 107 mmol/L    CO2 23 21 - 32 mmol/L    Anion gap 7 7 - 16 mmol/L    Glucose 118 (H) 65 - 100 mg/dL    BUN 13 8 - 23 MG/DL    Creatinine 0.80 0.8 - 1.5 MG/DL    GFR est AA >60 >60 ml/min/1.73m2    GFR est non-AA >60 >60 ml/min/1.73m2    Calcium 8.2 (L) 8.3 - 10.4 MG/DL       All Micro Results     Procedure Component Value Units Date/Time    CULTURE, URINE [[id number]]  (Abnormal) Collected: 03/03/22 1252    Order Status: Completed Specimen: Urine from Clean catch Updated: 03/05/22 0851     Special Requests: NO SPECIAL REQUESTS        Culture result:       >100,000 COLONIES/mL GRAM NEGATIVE RODS IDENTIFICATION AND SUSCEPTIBILITY TO FOLLOW          BLOOD CULTURE ID PANEL [[id number]]  (Abnormal) Collected: 03/05/22 0320    Order Status: Completed Specimen: Blood Updated: 03/05/22 0841     Acc. no. from Micro Order [id number]     Pseudomonas aeruginosa Detected        Comment: RESULTS VERIFIED, PHONED TO AND READ BACK BY  TATA MOREL RN ON 3/5/22 @2308, BETH          KPC (Carbapenem Resistance Gene) NOT DETECTED        Comment: WARNING:  A Not Detected result for the KPC gene does not indicate susceptibility to carbapenems. Gram negative bacteria can be resistant to carbapenems by mechanisms other than carrying the KPC gene. INTERPRETATION       Gram negative craig. Identified by realtime PCR as Pseudomonas aeruginosa.           CULTURE, BLOOD [[id number]] Collected: 03/03/22 1338    Order Status: Completed Specimen: Blood Updated: 03/05/22 0870     Special Requests: LEFT ANTECUBITAL        GRAM STAIN GRAM NEGATIVE RODS         AEROBIC BOTTLE POSITIVE               RESULTS VERIFIED, PHONED TO AND READ BACK BY TATA MOREL RN ON 3/4/22 @1732, [address]           Culture result:       CULTURE IN [address] UPDATES TO FOLLOW                  Refer to Blood Culture ID Panel Accession  [id number]      CULTURE, BLOOD [[id number]] Collected: 03/03/22 1708    Order Status: Completed Specimen: Blood Updated: 03/05/22 8475     Special Requests: --        LEFT  HAND       Culture result: NO GROWTH 2 DAYS             Other Studies:  No results found.     Current Meds:  Current Facility-Administered Medications   Medication Dose Route Frequency    simethicone (MYLICON) tablet 80 mg  80 mg Oral QID PRN    morphine injection 2 mg  2 mg IntraVENous Q6H PRN    piperacillin-tazobactam (ZOSYN) 4.5 g in 0.9% sodium chloride (MBP/ADV) 100 mL MBP  4.5 g IntraVENous Q8H    sodium chloride (NS) flush 5-40 mL  5-40 mL IntraVENous Q8H    sodium chloride (NS) flush 5-40 mL  5-40 mL IntraVENous PRN    enoxaparin (LOVENOX) injection 40 mg  40 mg SubCUTAneous Q24H    tamsulosin (FLOMAX) capsule 0.4 mg  0.4 mg Oral DAILY    [Held by provider] amLODIPine (NORVASC) tablet 5 mg  5 mg Oral DAILY    aspirin delayed-release tablet 81 mg  81 mg Oral DAILY    cyanocobalamin tablet 1,000 mcg  1,000 mcg Oral DAILY    ezetimibe (ZETIA) tablet 10 mg  10 mg Oral DAILY    levothyroxine (SYNTHROID) tablet 50 mcg  50 mcg Oral ACB    [Held by provider] lisinopril-hydroCHLOROthiazide (PRINZIDE, ZESTORETIC) 20-12.5 mg per tablet 1 Tablet  1 Tablet Oral DAILY    pantoprazole (PROTONIX) tablet 40 mg  40 mg Oral ACB    potassium chloride (K-DUR, KLOR-CON M20) SR tablet 20 mEq  20 mEq Oral DAILY    pregabalin (LYRICA) capsule 225 mg  225 mg Oral QHS    rosuvastatin (CRESTOR) tablet 20 mg  20 mg Oral DAILY    sodium chloride (NS) flush 5-40 mL  5-40 mL IntraVENous Q8H    sodium chloride (NS) flush 5-40 mL 5-40 mL IntraVENous PRN    acetaminophen (TYLENOL) tablet 650 mg  650 mg Oral Q6H PRN    Or    acetaminophen (TYLENOL) suppository 650 mg  650 mg Rectal Q6H PRN    polyethylene glycol (MIRALAX) packet 17 g  17 g Oral DAILY PRN    ondansetron (ZOFRAN ODT) tablet 4 mg  4 mg Oral Q8H PRN    Or    ondansetron (ZOFRAN) injection 4 mg  4 mg IntraVENous Q6H PRN    temazepam (RESTORIL) capsule 15 mg  15 mg Oral QHS PRN       Signed:  Estrellita Mota MD    Part of this note may have been written by using a voice dictation software. The note has been proof read but may still contain some grammatical/other typographical errors.

## 2022-03-06 LAB
ANION GAP SERPL CALC-SCNC: 7 MMOL/L (ref 7–16)
BACTERIA SPEC CULT: ABNORMAL
BASOPHILS # BLD: 0.1 K/UL (ref 0–0.2)
BASOPHILS NFR BLD: 0 % (ref 0–2)
BUN SERPL-MCNC: 9 MG/DL (ref 8–23)
CALCIUM SERPL-MCNC: 8.8 MG/DL (ref 8.3–10.4)
CHLORIDE SERPL-SCNC: 105 MMOL/L (ref 98–107)
CO2 SERPL-SCNC: 25 MMOL/L (ref 21–32)
CREAT SERPL-MCNC: 0.7 MG/DL (ref 0.8–1.5)
DIFFERENTIAL METHOD BLD: ABNORMAL
EOSINOPHIL # BLD: 0.2 K/UL (ref 0–0.8)
EOSINOPHIL NFR BLD: 1 % (ref 0.5–7.8)
ERYTHROCYTE [DISTWIDTH] IN BLOOD BY AUTOMATED COUNT: 13.8 % (ref 11.9–14.6)
GLUCOSE SERPL-MCNC: 108 MG/DL (ref 65–100)
HCT VFR BLD AUTO: 33.2 % (ref 41.1–50.3)
HGB BLD-MCNC: 10.8 G/DL (ref 13.6–17.2)
IMM GRANULOCYTES # BLD AUTO: 0.1 K/UL (ref 0–0.5)
IMM GRANULOCYTES NFR BLD AUTO: 1 % (ref 0–5)
LYMPHOCYTES # BLD: 2.1 K/UL (ref 0.5–4.6)
LYMPHOCYTES NFR BLD: 12 % (ref 13–44)
MCH RBC QN AUTO: 29.3 PG (ref 26.1–32.9)
MCHC RBC AUTO-ENTMCNC: 32.5 G/DL (ref 31.4–35)
MCV RBC AUTO: 90 FL (ref 79.6–97.8)
MONOCYTES # BLD: 1.8 K/UL (ref 0.1–1.3)
MONOCYTES NFR BLD: 10 % (ref 4–12)
NEUTS SEG # BLD: 12.8 K/UL (ref 1.7–8.2)
NEUTS SEG NFR BLD: 75 % (ref 43–78)
NRBC # BLD: 0 K/UL (ref 0–0.2)
PLATELET # BLD AUTO: 362 K/UL (ref 150–450)
PMV BLD AUTO: 9.4 FL (ref 9.4–12.3)
POTASSIUM SERPL-SCNC: 3.5 MMOL/L (ref 3.5–5.1)
RBC # BLD AUTO: 3.69 M/UL (ref 4.23–5.6)
SERVICE CMNT-IMP: ABNORMAL
SODIUM SERPL-SCNC: 137 MMOL/L (ref 136–145)
WBC # BLD AUTO: 17 K/UL (ref 4.3–11.1)

## 2022-03-06 PROCEDURE — 65270000029 HC RM PRIVATE

## 2022-03-06 PROCEDURE — 74011250637 HC RX REV CODE- 250/637: Performed by: INTERNAL MEDICINE

## 2022-03-06 PROCEDURE — 80048 BASIC METABOLIC PNL TOTAL CA: CPT

## 2022-03-06 PROCEDURE — 97165 OT EVAL LOW COMPLEX 30 MIN: CPT

## 2022-03-06 PROCEDURE — 97530 THERAPEUTIC ACTIVITIES: CPT

## 2022-03-06 PROCEDURE — 74011250636 HC RX REV CODE- 250/636: Performed by: FAMILY MEDICINE

## 2022-03-06 PROCEDURE — 87040 BLOOD CULTURE FOR BACTERIA: CPT

## 2022-03-06 PROCEDURE — 74011000250 HC RX REV CODE- 250: Performed by: INTERNAL MEDICINE

## 2022-03-06 PROCEDURE — 85025 COMPLETE CBC W/AUTO DIFF WBC: CPT

## 2022-03-06 PROCEDURE — 74011250636 HC RX REV CODE- 250/636: Performed by: INTERNAL MEDICINE

## 2022-03-06 PROCEDURE — 36415 COLL VENOUS BLD VENIPUNCTURE: CPT

## 2022-03-06 PROCEDURE — 74011250637 HC RX REV CODE- 250/637: Performed by: HOSPITALIST

## 2022-03-06 PROCEDURE — 74011000258 HC RX REV CODE- 258: Performed by: INTERNAL MEDICINE

## 2022-03-06 RX ADMIN — PIPERACILLIN SODIUM AND TAZOBACTAM SODIUM 4.5 G: 4; .5 INJECTION, POWDER, LYOPHILIZED, FOR SOLUTION INTRAVENOUS at 03:02

## 2022-03-06 RX ADMIN — SODIUM CHLORIDE, PRESERVATIVE FREE 5 ML: 5 INJECTION INTRAVENOUS at 22:06

## 2022-03-06 RX ADMIN — SODIUM CHLORIDE, PRESERVATIVE FREE 10 ML: 5 INJECTION INTRAVENOUS at 05:26

## 2022-03-06 RX ADMIN — CYANOCOBALAMIN TAB 1000 MCG 1000 MCG: 1000 TAB at 09:03

## 2022-03-06 RX ADMIN — ACETAMINOPHEN 650 MG: 325 TABLET ORAL at 09:28

## 2022-03-06 RX ADMIN — ENOXAPARIN SODIUM 40 MG: 100 INJECTION SUBCUTANEOUS at 19:00

## 2022-03-06 RX ADMIN — TAMSULOSIN HYDROCHLORIDE 0.4 MG: 0.4 CAPSULE ORAL at 09:03

## 2022-03-06 RX ADMIN — TEMAZEPAM 15 MG: 15 CAPSULE ORAL at 22:05

## 2022-03-06 RX ADMIN — EZETIMIBE 10 MG: 10 TABLET ORAL at 09:03

## 2022-03-06 RX ADMIN — POTASSIUM CHLORIDE 20 MEQ: 20 TABLET, EXTENDED RELEASE ORAL at 09:03

## 2022-03-06 RX ADMIN — PIPERACILLIN SODIUM AND TAZOBACTAM SODIUM 4.5 G: 4; .5 INJECTION, POWDER, LYOPHILIZED, FOR SOLUTION INTRAVENOUS at 19:00

## 2022-03-06 RX ADMIN — ROSUVASTATIN CALCIUM 20 MG: 20 TABLET, FILM COATED ORAL at 09:03

## 2022-03-06 RX ADMIN — PANTOPRAZOLE SODIUM 40 MG: 40 TABLET, DELAYED RELEASE ORAL at 05:27

## 2022-03-06 RX ADMIN — PREGABALIN 225 MG: 150 CAPSULE ORAL at 22:05

## 2022-03-06 RX ADMIN — PIPERACILLIN SODIUM AND TAZOBACTAM SODIUM 4.5 G: 4; .5 INJECTION, POWDER, LYOPHILIZED, FOR SOLUTION INTRAVENOUS at 09:03

## 2022-03-06 RX ADMIN — ASPIRIN 81 MG: 81 TABLET ORAL at 09:03

## 2022-03-06 RX ADMIN — MORPHINE SULFATE 2 MG: 4 INJECTION INTRAVENOUS at 19:24

## 2022-03-06 RX ADMIN — LEVOTHYROXINE SODIUM 50 MCG: 0.05 TABLET ORAL at 05:27

## 2022-03-06 NOTE — PROGRESS NOTES
Pt in bed alert and oriented x4, denies pain or need at this time. Pt was able to sleep over the night with no concerns or distress. Pt on RA with stable respirations. Call light in reach. Preparing to give report to oncoming RN.

## 2022-03-06 NOTE — PROGRESS NOTES
Assumed pt care. Report received from Cheng Perez, [facility].     Pt in bed resting with no apparent distress. Mild abd discomfort expressed but will communicate when he feels the need for medication. No need expressed at this time. Pt on room air with stable respirations. Briseno draining yellow urine. Call light in reach. Pt encouraged to call with needs. Wife at bedside. No questions asked.

## 2022-03-06 NOTE — PROGRESS NOTES
ACUTE OT GOALS:  (Developed with and agreed upon by patient and/or caregiver.)  1. Patient will complete lower body bathing and dressing with MOD I and adaptive equipment as needed. 2. Patient will complete toileting with MOD I.   3. Patient will tolerate 30 minutes of OT treatment with 1-2 rest breaks to increase activity tolerance for ADLs. 4. Patient will complete functional transfers with MOD I and adaptive equipment as needed. 5. Patient will complete functional mobility for household distances with MOD I and adaptive equipment as needed. 6. Patient will complete self-grooming while standing edge of sink with MOD I and adaptive equipment as needed. Timeframe: 7 visits         OCCUPATIONAL THERAPY ASSESSMENT: Initial Assessment and Daily Note OT Treatment Day # 1    Loreto Baires is a 68 y.o. male   PRIMARY DIAGNOSIS: Sepsis ([facility] [address]  Sepsis ([facility] [address] [A41.9]       Reason for Referral:   ICD-10: Treatment Diagnosis: Generalized Muscle Weakness (M62.81)  [place]PAT[place] / [place] B / Product Type: Medicare /   ASSESSMENT:     REHAB RECOMMENDATIONS:   Recommendation to date pending progress:  Setting:   No further skilled therapy after discharge from hospital  Equipment:    None     PRIOR LEVEL OF FUNCTION:  (Prior to Hospitalization)  INITIAL/CURRENT LEVEL OF FUNCTION:  (Based on today's evaluation)   Bathing:   Independent  Dressing:   Independent  Feeding/Grooming:   Independent  Toileting:   Independent  Functional Mobility:   Independent Bathing:   Minimal Assistance  Dressing:   Minimal Assistance  Feeding/Grooming:   Set Up  Toileting:   Standby Assistance  Functional Mobility:   Contact Guard Assistance     ASSESSMENT:  Mr. Ban Rojas presents with deficits in overall strength, activity tolerance, ADL performance and functional mobility. Admitted for sepsis and urinary retention.  Patient recently under laminectomy in February 2022; has since been home and doing well; reports graduating from [facility] therapy services and was set to begin OP PT at [facility] this week. BUE AROM and strength (4/5) are generally decreased but WFL. SBA for functional bed mobility/transfers; intact EOB sitting balance. CGA for sit <> stand and mobility for household distances. States he has been managing his ADLs during this hospitalization. At this time, Leobardo Weller is functioning below baseline for ADLs and functional mobility. Pt would benefit from skilled OT services to address OT goals and and plan of care. .     SUBJECTIVE:   Mr. Luther Flood states, \"My right leg feels a little weak. \"    SOCIAL HISTORY/LIVING ENVIRONMENT: Lives in [facility] with 14 steps to manage to living accommodations. Recent laminectomy in February 2022 and has since been home and doing well. Graduated from [facility] services and was about to begin OP PT at [facility] prior to hospitalization. Since patient has been home from surgery he has been managing ADLs independently.    Home Environment: Independent living  One/Two Story Residence: Two story  Living Alone: No  Support Systems: Spouse/Significant Other,Child(madeline)    OBJECTIVE:     PAIN: VITAL SIGNS: LINES/DRAINS:   Pre Treatment: Pain Screen  Pain Scale 1: Numeric (0 - 10)  Pain Intensity 1: 2  Pain Onset 1: ongoing  Pain Location 1: Back  Pain Orientation 1: Lower  Post Treatment: 2   Briseno Catheter and IV  O2 Device: None (Room air)     GROSS EVALUATION:  BUEs Within Functional Limits Abnormal/ Functional Abnormal/ Non-Functional (see comments) Not Tested Comments:   AROM [x] [] [] []    PROM [] [] [] []    Strength [x] [] [] [] BUE   Balance [] [x] [] [] Intact sitting balance; fair (+) standing balance    Posture [x] [] [] []    Sensation [x] [] [] []    Coordination [x] [] [] []    Tone [x] [] [] []    Edema [x] [] [] []    Activity Tolerance [] [x] [] [] Generally decreased    [] [] [] []      COGNITION/  PERCEPTION: Intact Impaired   (see comments) Comments: Orientation [x] []    Vision [x] []    Hearing [x] []    Judgment/ Insight [x] []    Attention [x] []    Memory [x] []    Command Following [x] []    Emotional Regulation [x] []     [] []      ACTIVITIES OF DAILY LIVING: I Mod I S SBA CGA Min Mod Max Total NT Comments   BASIC ADLs:              Bathing/ Showering [] [] [] [] [] [] [] [] [] [x]    Toileting [] [] [] [] [] [] [] [] [] [x]    Dressing [] [] [] [] [] [] [] [] [] [x]    Feeding [] [] [] [] [] [] [] [] [] [x]    Grooming [] [] [x] [] [] [] [] [] [] []    Personal Device Care [] [] [] [] [] [] [] [] [] [x]    Functional Mobility [] [] [] [x] [x] [] [] [] [] []    I=Independent, Mod I=Modified Independent, S=Supervision, SBA=Standby Assistance, CGA=Contact Guard Assistance,   Min=Minimal Assistance, Mod=Moderate Assistance, Max=Maximal Assistance, Total=Total Assistance, NT=Not Tested    MOBILITY: I Mod I S SBA CGA Min Mod Max Total  NT x2 Comments:   Supine to sit [] [] [] [x] [] [] [] [] [] [] []    Sit to supine [] [] [] [x] [] [] [] [] [] [] []    Sit to stand [] [] [] [x] [] [] [] [] [] [] []    Bed to chair [] [] [] [] [] [] [] [] [] [x] []    I=Independent, Mod I=Modified Independent, S=Supervision, SBA=Standby Assistance, CGA=Contact Guard Assistance,   Min=Minimal Assistance, Mod=Moderate Assistance, Max=Maximal Assistance, Total=Total Assistance, NT=Not Tested    [address] [facility] [address] [address] AM-PAC 6 Clicks   Daily Activity Inpatient Short Form        How much help from another person does the patient currently need. .. Total A Lot A Little None   1. Putting on and taking off regular lower body clothing? [] 1   [] 2   [x] 3   [] 4   2. Bathing (including washing, rinsing, drying)? [] 1   [] 2   [x] 3   [] 4   3. Toileting, which includes using toilet, bedpan or urinal?   [] 1   [] 2   [x] 3   [] 4   4. Putting on and taking off regular upper body clothing? [] 1   [] 2   [] 3   [x] 4   5. Taking care of personal grooming such as brushing teeth? [] 1   [] 2   [] 3   [x] 4   6. Eating meals? [] 1   [] 2   [] 3   [x] 4   © 2007, Trustees of [facility] [employer], under license to [employer]. All rights reserved     Score:  Initial: 21 Most Recent: X (Date: -- )   Interpretation of Tool:  Represents activities that are increasingly more difficult (i.e. Bed mobility, Transfers, Gait). PLAN:   FREQUENCY/DURATION: OT Plan of Care: 3 times/week for duration of hospital stay or until stated goals are met, whichever comes first.    PROBLEM LIST:   (Skilled intervention is medically necessary to address:)  1. Decreased ADL/Functional Activities  2. Decreased Activity Tolerance  3. Decreased AROM/PROM  4. Decreased Balance  5. Decreased Gait Ability  6. Decreased Strength  7. Decreased Transfer Abilities  8. Increased Pain   INTERVENTIONS PLANNED:   (Benefits and precautions of occupational therapy have been discussed with the patient.)  1. Self Care Training  2. Therapeutic Activity  3. Therapeutic Exercise/HEP  4. Neuromuscular Re-education  5. Education     TREATMENT:     EVALUATION: Low Complexity : (Untimed Charge)    TREATMENT:   ( $$ Therapeutic Activity: 8-22 mins   )  Therapeutic Activity (10 Minutes): Therapeutic activity included Rolling, Supine to Sit, Transfer Training, Ambulation on level ground, Sitting balance  and Standing balance to improve functional Mobility, Strength and Activity tolerance.     TREATMENT GRID:  N/A    AFTER TREATMENT POSITION/PRECAUTIONS:  Bed, Needs within reach and RN notified    INTERDISCIPLINARY COLLABORATION:  RN/PCT and OT/THORPE    TOTAL TREATMENT DURATION:  OT Patient Time In/Time Out  Time In: 3440  Time Out: [address], OT

## 2022-03-06 NOTE — PROGRESS NOTES
Assumed care of patient. Patient resting in bed. Patient stated he had the best sleep last night and wants the name of the med they gave him to help him sleep bc it was the best sleep. Patient is A&Ox4. Currently on room air. Denies any pain, flatulence, or sob. States he feels better today than he has in a while. No other needs voiced at this time. Call light and belongings within reach. Will monitor throughout shift.

## 2022-03-06 NOTE — PROGRESS NOTES
Hospitalist Progress Note   Admit Date:  3/3/2022 12:30 PM   Name:  Emma Cardenas   Age:  68 y.o. Sex:  male  :  1945   MRN:  [id number]   Room:  807/01    Presenting Complaint: No chief complaint on file. Reason(s) for Admission: Sepsis [facility]) [A41.9]     Hospital Course & Interval History:   Emma Cardenas is a 68 y.o. male with medical history of HLP, BPH, HTN, GERD, hypothyroidism, CAD who presented with report of fevers, rigors, weakness and relative hypotension with presyncope episode. Pt s/p L2-S1 laminectomy 2/10/22 and was discharged home with Briseno catheter due to urinary tension. Patient admitted with sepsis secondary to UTI and started on empiric broad-spectrum antibiotics. Blood culture and urine culture growing gram-negative rods. Subjective/24hr Events (22): Patient is seen at the bedside. Afebrile for 24 hours. Reports shivering has been improved significantly. Denies chest pain, palpitation, nausea, vomiting or abdominal pain. Reports he is feeling a lot better today. ROS:  10 systems reviewed and negative except as noted above. Assessment & Plan:     # Sepsis secondary to CAP UTI  - empiric vanco/zosyn  - Vancomycin discontinued on 3/5  Urine culture grew Pseudomonas aeruginosa. Repeat blood culture today. ID consult tomorrow a.m. for antibiotic recommendation on discharge. Continue Zosyn. # Urinary retention  - Briseno re-inserted in ED  - Continue Flomax  - Consult to urology  Urology recommend continue Briseno on discharge. Plan for cystoscopy after discharge. Patient to follow-up outpatient. # Lumbar stenosis s/p l2-s1 laminectomy 2/10  - Incision site well healed.  No focal tenderness and no overt fluid collection or abscess on CT  - doubt postoperative spine infection  - continue lyrica     # HLP  - Statin     # Hypothyroidism  - Synthroid     # HTN  - Holding home meds due to borderline BP, resume as appropriate     # Tanda Harsh  - PPI     # CAD  - continue asa/statin      Dispo/Discharge Planning:  TBD, PT/OT    Diet:  ADULT DIET Regular; Low Sodium (2 gm)  DVT PPx: Lovenox  Code status: Full Code    Hospital Problems as of 3/6/2022 Date Reviewed: 3/3/2022          Codes Class Noted - Resolved POA    * (Principal) Sepsis ([facility] [address] ICD-10-CM: A41.9  ICD-9-CM: 038.9, 995.91  3/3/2022 - Present Unknown        Spinal stenosis of lumbar region at multiple levels ICD-10-CM: M48.061  ICD-9-CM: 724.02  2/10/2022 - Present Yes        Coronary artery disease due to calcified coronary lesion ICD-10-CM: I25.10, I25.84  ICD-9-CM: 414.00, 414.4  11/12/2020 - Present Yes        Essential hypertension ICD-10-CM: I10  ICD-9-CM: 401.9  9/24/2020 - Present Yes        Mixed hyperlipidemia ICD-10-CM: E78.2  ICD-9-CM: 272.2  5/16/2016 - Present Yes        Hypothyroidism due to acquired atrophy of thyroid ICD-10-CM: E03.4  ICD-9-CM: 244.8, 246.8  4/15/2015 - Present Yes              Objective:     Patient Vitals for the past 24 hrs:   Temp Pulse Resp BP SpO2   03/06/22 1143 98.4 °F (36.9 °C) 71 20 116/69 96 %   03/06/22 0321 97.9 °F (36.6 °C) 75 19 139/84 97 %   03/05/22 2244 99.4 °F (37.4 °C) (!) 57 19 (!) 148/62 91 %   03/05/22 2015 99.1 °F (37.3 °C) 69 18 130/68 96 %   03/05/22 1454 99.5 °F (37.5 °C) 78 18 131/72 98 %     Oxygen Therapy  O2 Sat (%): 96 % (03/06/22 1143)  Pulse via Oximetry: 106 beats per minute (03/03/22 1515)  O2 Device: None (Room air) (03/06/22 0720)    Estimated body mass index is 26.83 kg/m² as calculated from the following:    Height as of 2/2/22: 6' 2\" (1.88 m). Weight as of this encounter: 94.8 kg (208 lb 15.9 oz). Intake/Output Summary (Last 24 hours) at 3/6/2022 1144  Last data filed at 3/6/2022 1043  Gross per 24 hour   Intake 300 ml   Output 3450 ml   Net -3150 ml         Physical Exam:     Blood pressure 116/69, pulse 71, temperature 98.4 °F (36.9 °C), resp. rate 20, weight 94.8 kg (208 lb 15.9 oz), SpO2 96 %.   General:    No overt distress  Head:  Normocephalic, atraumatic  Eyes:  Sclerae appear normal.  Pupils equally round. ENT:  Nares appear normal, no drainage. Moist oral mucosa  Neck:  No restricted ROM. Trachea midline   CV:   RRR. No m/r/g. No jugular venous distension. Lungs:   CTAB. No wheezing, rhonchi, or rales. Respirations even, unlabored  Abdomen: Bowel sounds present. Soft, nontender, nondistended. Extremities: No cyanosis or clubbing. No edema  Skin:     No rashes and normal coloration. Warm and dry. Neuro:  CN II-XII grossly intact. Sensation intact. A&Ox3  Psych:  Normal mood and affect. I have reviewed ordered lab tests and independently visualized imaging below:    Recent Labs:  Recent Results (from the past 48 hour(s))   BLOOD CULTURE ID PANEL    Collection Time: 03/05/22  3:20 AM    Specimen: Blood   Result Value Ref Range    Acc. no. from Micro Order [id number]     Pseudomonas aeruginosa Detected (A) NOTDET      KPC (Carbapenem Resistance Gene) NOT DETECTED NOTDET      INTERPRETATION        Gram negative craig. Identified by realtime PCR as Pseudomonas aeruginosa. VANCOMYCIN, RANDOM    Collection Time: 03/05/22  3:38 AM   Result Value Ref Range    Vancomycin, random 7.1 UG/ML   CBC WITH AUTOMATED DIFF    Collection Time: 03/05/22 11:30 AM   Result Value Ref Range    WBC 19.7 (H) 4.3 - 11.1 K/uL    RBC 3.21 (L) 4.23 - 5.6 M/uL    HGB 9.4 (L) 13.6 - 17.2 g/dL    HCT 28.6 (L) 41.1 - 50.3 %    MCV 89.1 79.6 - 97.8 FL    MCH 29.3 26.1 - 32.9 PG    MCHC 32.9 31.4 - 35.0 g/dL    RDW 14.0 11.9 - 14.6 %    PLATELET 983 108 - 701 K/uL    MPV 9.3 (L) 9.4 - 12.3 FL    ABSOLUTE NRBC 0.00 0.0 - 0.2 K/uL    DF AUTOMATED      NEUTROPHILS 78 43 - 78 %    LYMPHOCYTES 8 (L) 13 - 44 %    MONOCYTES 11 4.0 - 12.0 %    EOSINOPHILS 1 0.5 - 7.8 %    BASOPHILS 0 0.0 - 2.0 %    IMMATURE GRANULOCYTES 1 0.0 - 5.0 %    ABS. NEUTROPHILS 15.4 (H) 1.7 - 8.2 K/UL    ABS. LYMPHOCYTES 1.6 0.5 - 4.6 K/UL    ABS.  MONOCYTES 2.2 (H) 0.1 - 1.3 K/UL    ABS. EOSINOPHILS 0.2 0.0 - 0.8 K/UL    ABS. BASOPHILS 0.1 0.0 - 0.2 K/UL    ABS. IMM. GRANS. 0.3 0.0 - 0.5 K/UL   METABOLIC PANEL, BASIC    Collection Time: 03/05/22 11:30 AM   Result Value Ref Range    Sodium 136 (L) 138 - 145 mmol/L    Potassium 3.6 3.5 - 5.1 mmol/L    Chloride 106 98 - 107 mmol/L    CO2 23 21 - 32 mmol/L    Anion gap 7 7 - 16 mmol/L    Glucose 118 (H) 65 - 100 mg/dL    BUN 13 8 - 23 MG/DL    Creatinine 0.80 0.8 - 1.5 MG/DL    GFR est AA >60 >60 ml/min/1.73m2    GFR est non-AA >60 >60 ml/min/1.73m2    Calcium 8.2 (L) 8.3 - 10.4 MG/DL   CBC WITH AUTOMATED DIFF    Collection Time: 03/06/22  4:10 AM   Result Value Ref Range    WBC 17.0 (H) 4.3 - 11.1 K/uL    RBC 3.69 (L) 4.23 - 5.6 M/uL    HGB 10.8 (L) 13.6 - 17.2 g/dL    HCT 33.2 (L) 41.1 - 50.3 %    MCV 90.0 79.6 - 97.8 FL    MCH 29.3 26.1 - 32.9 PG    MCHC 32.5 31.4 - 35.0 g/dL    RDW 13.8 11.9 - 14.6 %    PLATELET 696 072 - 814 K/uL    MPV 9.4 9.4 - 12.3 FL    ABSOLUTE NRBC 0.00 0.0 - 0.2 K/uL    DF AUTOMATED      NEUTROPHILS 75 43 - 78 %    LYMPHOCYTES 12 (L) 13 - 44 %    MONOCYTES 10 4.0 - 12.0 %    EOSINOPHILS 1 0.5 - 7.8 %    BASOPHILS 0 0.0 - 2.0 %    IMMATURE GRANULOCYTES 1 0.0 - 5.0 %    ABS. NEUTROPHILS 12.8 (H) 1.7 - 8.2 K/UL    ABS. LYMPHOCYTES 2.1 0.5 - 4.6 K/UL    ABS. MONOCYTES 1.8 (H) 0.1 - 1.3 K/UL    ABS. EOSINOPHILS 0.2 0.0 - 0.8 K/UL    ABS. BASOPHILS 0.1 0.0 - 0.2 K/UL    ABS. IMM.  GRANS. 0.1 0.0 - 0.5 K/UL   METABOLIC PANEL, BASIC    Collection Time: 03/06/22  4:10 AM   Result Value Ref Range    Sodium 137 136 - 145 mmol/L    Potassium 3.5 3.5 - 5.1 mmol/L    Chloride 105 98 - 107 mmol/L    CO2 25 21 - 32 mmol/L    Anion gap 7 7 - 16 mmol/L    Glucose 108 (H) 65 - 100 mg/dL    BUN 9 8 - 23 MG/DL    Creatinine 0.70 (L) 0.8 - 1.5 MG/DL    GFR est AA >60 >60 ml/min/1.73m2    GFR est non-AA >60 >60 ml/min/1.73m2    Calcium 8.8 8.3 - 10.4 MG/DL       All Micro Results     Procedure Component Value Units Date/Time    CULTURE, URINE [[id number]]  (Abnormal)  (Susceptibility) Collected: 03/03/22 1252    Order Status: Completed Specimen: Urine from Clean catch Updated: 03/06/22 0829     Special Requests: NO SPECIAL REQUESTS        Culture result:       >100,000 COLONIES/mL PSEUDOMONAS AERUGINOSA          CULTURE, BLOOD [[id number]]  (Abnormal) Collected: 03/03/22 1338    Order Status: Completed Specimen: Blood Updated: 03/06/22 0715     Special Requests: LEFT ANTECUBITAL        GRAM STAIN GRAM NEGATIVE RODS         AEROBIC BOTTLE POSITIVE               RESULTS VERIFIED, PHONED TO AND READ BACK BY TATA MOREL RN ON 3/4/22 @0838, [address]           Culture result:       GRAM NEGATIVE RODS IDENTIFICATION AND SUSCEPTIBILITY TO FOLLOW                  Refer to Blood Culture ID Panel Accession  [id number]      CULTURE, BLOOD [[id number]] Collected: 03/06/22 0410    Order Status: Completed Specimen: Blood Updated: 03/06/22 0503    BLOOD CULTURE ID PANEL [[id number]]  (Abnormal) Collected: 03/05/22 0320    Order Status: Completed Specimen: Blood Updated: 03/05/22 0841     Acc. no. from Micro Order [id number]     Pseudomonas aeruginosa Detected        Comment: RESULTS VERIFIED, PHONED TO AND READ BACK BY  TATA MOREL RN ON 3/5/22 @4044, B[facility]          KPC (Carbapenem Resistance Gene) NOT DETECTED        Comment: WARNING:  A Not Detected result for the KPC gene does not indicate susceptibility to carbapenems. Gram negative bacteria can be resistant to carbapenems by mechanisms other than carrying the KPC gene. INTERPRETATION       Gram negative craig. Identified by realtime PCR as Pseudomonas aeruginosa. CULTURE, BLOOD [[id number]] Collected: 03/03/22 1708    Order Status: Completed Specimen: Blood Updated: 03/05/22 0733     Special Requests: --        LEFT  HAND       Culture result: NO GROWTH 2 DAYS             Other Studies:  No results found.     Current Meds:  Current Facility-Administered Medications   Medication Dose Route Frequency    simethicone (MYLICON) tablet 80 mg  80 mg Oral QID PRN    morphine injection 2 mg  2 mg IntraVENous Q6H PRN    piperacillin-tazobactam (ZOSYN) 4.5 g in 0.9% sodium chloride (MBP/ADV) 100 mL MBP  4.5 g IntraVENous Q8H    sodium chloride (NS) flush 5-40 mL  5-40 mL IntraVENous Q8H    sodium chloride (NS) flush 5-40 mL  5-40 mL IntraVENous PRN    enoxaparin (LOVENOX) injection 40 mg  40 mg SubCUTAneous Q24H    tamsulosin (FLOMAX) capsule 0.4 mg  0.4 mg Oral DAILY    [Held by provider] amLODIPine (NORVASC) tablet 5 mg  5 mg Oral DAILY    aspirin delayed-release tablet 81 mg  81 mg Oral DAILY    cyanocobalamin tablet 1,000 mcg  1,000 mcg Oral DAILY    ezetimibe (ZETIA) tablet 10 mg  10 mg Oral DAILY    levothyroxine (SYNTHROID) tablet 50 mcg  50 mcg Oral ACB    [Held by provider] lisinopril-hydroCHLOROthiazide (PRINZIDE, ZESTORETIC) 20-12.5 mg per tablet 1 Tablet  1 Tablet Oral DAILY    pantoprazole (PROTONIX) tablet 40 mg  40 mg Oral ACB    potassium chloride (K-DUR, KLOR-CON M20) SR tablet 20 mEq  20 mEq Oral DAILY    pregabalin (LYRICA) capsule 225 mg  225 mg Oral QHS    rosuvastatin (CRESTOR) tablet 20 mg  20 mg Oral DAILY    sodium chloride (NS) flush 5-40 mL  5-40 mL IntraVENous Q8H    sodium chloride (NS) flush 5-40 mL  5-40 mL IntraVENous PRN    acetaminophen (TYLENOL) tablet 650 mg  650 mg Oral Q6H PRN    Or    acetaminophen (TYLENOL) suppository 650 mg  650 mg Rectal Q6H PRN    polyethylene glycol (MIRALAX) packet 17 g  17 g Oral DAILY PRN    ondansetron (ZOFRAN ODT) tablet 4 mg  4 mg Oral Q8H PRN    Or    ondansetron (ZOFRAN) injection 4 mg  4 mg IntraVENous Q6H PRN    temazepam (RESTORIL) capsule 15 mg  15 mg Oral QHS PRN       Signed:  Brynn Dailey MD    Part of this note may have been written by using a voice dictation software. The note has been proof read but may still contain some grammatical/other typographical errors.

## 2022-03-06 NOTE — PROGRESS NOTES
Assumed pt care. Pt in bed alert and oriented x4 with mild pain and requesting Tylenol. Pt on RA with stable respirations, denies SOB. Clear speech. No acute distress noted at this time. Call light in reach. Zosyn infusing.

## 2022-03-06 NOTE — PROGRESS NOTES
Tylenol 650mg PO PRN given for abd pain 3-10, per pt's request.      Restoril 15mg PO PRN given for sleep, per pt's request.

## 2022-03-06 NOTE — CONSULTS
Infectious Disease Consult    Today's Date: 3/6/2022   Admit Date: 3/3/2022    Impression:   · Urosepsis with pseudomonas (PANS)  · Lumbar stenosis s/p L2-S1 laminectomy 2/10  · Urinary retention    Plan:   ·  Continue Zosyn for now  · Consider Cipro or LVQ when ready for discharge to complete 14 days total therapy days for complicated UTI and bacteremia  · Follow repeat blood cx  · ID will sign off today    Anti-infectives:   · Vancomycin  · Zosyn    Subjective:   Date of Consultation:  March 6, 2022  Referring Physician: Jose Hernandez    Patient is a 68 y.o. male  with medical history of HLP, BPH, HTN, GERD, hypothyroidism, CAD who presented with report of fevers, rigors, weakness and relative hypotension with presyncope episode in ER waiting room. Pt s/p L2-S1 laminectomy 2/10/22. Has been recovering well at home and working with PT and walking w/o assistance. Reports chronic back pain unchanged after surgery. Was discharged after surgery with kidd catheter and seen by urology this AM for kidd removal. Told to return at 3p if could not spontaneously void. Was unable to void but also felt so bad otherwise he decided to come to the ED. No nausea,vomiting. Admits to suprapubic tenderness. Hx of BPH and nocturia 2-3x that was unchanged prior to lumbar surgery.     ER workup WBC 20K, hgb 12, plts 507, UA wbc 20-50, bact +1, Na 134, BUn 15, Cr 1.0, ALT 23, AST 13, lactic acid 0.8  CT shows enlarged prostate, cholelithiasis, stable right adrenal mass, postop changes of L spine w/o fluid/abscess identified. Admitted by Hospitalist  for UTI/ sepsis. Blood cx growing Pseudomonas as well as urine. Isolate PANS.         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    Pain of right hip joint M25.551    Lumbar radicular pain R53.02    Systolic murmur B73.5    Elevated coronary artery calcium score R93.1    Enlarged thoracic aorta (HCC) I77.89    Essential hypertension I10    Peripheral polyneuropathy G62.9    Coronary artery disease due to calcified coronary lesion I25.10, I25.84    Spinal stenosis of lumbar region at multiple levels M48.061    Spinal stenosis M48.00    Sepsis (HCC) A41.9     Past Medical History:   Diagnosis Date    Axonal polyneuropathy 2018    B12 deficiency 2019    BPH (benign prostatic hyperplasia) 2012    CAD (coronary artery disease) 2020    Ca score 65    Diverticulitis     Family history of diabetes mellitus     GERD (gastroesophageal reflux disease)     medication    HDL deficiency 2012    Hemorrhoid     int and ext    Hypertension 2012    medication    Hypokalemia     Hypothyroid     medication    Leukocytosis     Lumbar radicular pain 2019    Murmur, cardiac     echo 20  EF 55-60%    Pain of right hip joint 2019    Paresthesia of right foot 2018    Prediabetes     diet control and weight loss  21 A1c 6.4    Right leg pain 2018      Family History   Problem Relation Age of Onset    Diabetes Mother     Cancer Sister         Had Leukemia    Cancer Brother 72        lung cancer/lung disease    Cancer Sister 76        breast cancer    Lung Disease Brother         COPD      Social History     Tobacco Use    Smoking status: Former Smoker     Packs/day: 0.00     Years: 30.00     Pack years: 0.00     Quit date:      Years since quittin.1    Smokeless tobacco: Never Used   Substance Use Topics    Alcohol use: No     Past Surgical History:   Procedure Laterality Date    HX COLONOSCOPY      HX ENDOSCOPY      HX ORTHOPAEDIC      right leg surgery to remove wire at age 12      Prior to Admission medications    Medication Sig Start Date End Date Taking?  Authorizing Provider   silodosin (Rapaflo) 8 mg capsule Take 1 Capsule by mouth daily (with breakfast). 3/3/22  Yes Concepción Aguirre NP   phenazopyridine (PYRIDIUM) 100 mg tablet Take 1 Tablet by mouth three (3) times daily as needed for Pain. 2/17/22  Yes Concepción Aguirre NP   acetaminophen (Tylenol Extra Strength) 500 mg tablet Take 1,000 mg by mouth every six (6) hours as needed (breakthrough pain). Yes Provider, Historical   pregabalin (LYRICA) 225 mg capsule Take 1 Capsule by mouth two (2) times a day. Max Daily Amount: 450 mg. Patient taking differently: Take 225 mg by mouth nightly. 11/10/21  Yes Phong Garrido MD   rosuvastatin (CRESTOR) 20 mg tablet Take 1 tablet by mouth nightly  Patient taking differently: Take 20 mg by mouth daily. 11/4/21  Yes Ever Must, MD   lisinopril-hydroCHLOROthiazide (Zestoretic) 20-12.5 mg per tablet Take 1 Tablet by mouth daily. 8/13/21  Yes Ever Must, MD   levothyroxine (SYNTHROID) 50 mcg tablet Take 1 Tablet by mouth Daily (before breakfast). 8/13/21  Yes Ever Must, MD   amLODIPine (NORVASC) 5 mg tablet Take 1 Tablet by mouth daily. 8/13/21  Yes Ever Must, MD   potassium chloride (K-DUR, KLOR-CON) 20 mEq tablet Take 1 Tablet by mouth daily. 8/13/21  Yes Ever Must, MD   ezetimibe (ZETIA) 10 mg tablet Take 1 Tablet by mouth daily. 8/13/21  Yes Ever Must, MD   omeprazole (PRILOSEC) 40 mg capsule Take 1 Capsule by mouth daily. 30 minutes prior to the largest meal of the day. 8/13/21  Yes Ever Must, MD   aspirin delayed-release 81 mg tablet Take 1 Tab by mouth daily. 11/12/20  Yes Ever Must, MD   multivitamin (ONE A DAY) tablet Take 1 Tab by mouth daily. Yes Provider, Historical   cyanocobalamin 1,000 mcg tablet Take 1,000 mcg by mouth daily. Yes Provider, Historical   pregabalin (LYRICA) 75 mg capsule Take 1-3 capsules by mouth QHS.  9/29/21   Phong Garrido MD       No Known Allergies     Review of Systems:  A comprehensive review of systems was negative except for that written in the History of Present Illness. Objective:     Visit Vitals  /84 (BP 1 Location: Right upper arm, BP Patient Position: Sitting)   Pulse 75   Temp 97.9 °F (36.6 °C)   Resp 19   Wt 94.8 kg (208 lb 15.9 oz)   SpO2 97%   BMI 26.83 kg/m²     Temp (24hrs), Av.9 °F (37.2 °C), Min:97.9 °F (36.6 °C), Max:99.5 °F (37.5 °C)       Lines:  Peripheral IV:       Physical Exam:    General:  Alert, cooperative, well noursished, well developed, appears stated age   Eyes:  Sclera anicteric. Pupils equally round and reactive to light. Mouth/Throat: Mucous membranes normal, oral pharynx clear   Neck: Supple   Lungs:   Clear to auscultation bilaterally, good effort   CV:  Regular rate and rhythm,no murmur, click, rub or gallop   Abdomen:   Soft, non-tender. bowel sounds normal. non-distended   Extremities: No cyanosis or edema   Skin: Skin color, texture, turgor normal. no acute rash or lesions, post op incision closed w/o sign of infection   Lymph nodes: Cervical and supraclavicular normal   Musculoskeletal: No swelling or deformity   Lines/Devices:  Intact, no erythema, drainage or tenderness   Psych: Alert and oriented, normal mood affect given the setting       Data Review:     CBC:  Recent Labs     22  1130 22  03422  0341   WBC 17.0* 19.7*  --  23.5*   GRANS 75 78   < > 81*   MONOS 10 11   < > 11   EOS 1 1   < > 0*   ANEU 12.8* 15.4*   < > 19.1*   ABL 2.1 1.6   < > 1.4   HGB 10.8* 9.4*  --  10.0*   HCT 33.2* 28.6*  --  30.4*    312  --  374    < > = values in this interval not displayed.        BMP:  Recent Labs     220 22  1130 22  0341   CREA 0.70* 0.80 0.90   BUN 9 13 14    136* 139   K 3.5 3.6 3.2*    106 107   CO2 25 23 21   AGAP 7 7 11   * 118* 103*       LFTS:  Recent Labs     22  0341 22  1243   TBILI 1.0 0.6   ALT 14 23   AP 57 69   TP 5.9* 7.5   ALB 2.5* 3.4       Microbiology:     All Micro Results Procedure Component Value Units Date/Time    CULTURE, URINE [[id number]]  (Abnormal)  (Susceptibility) Collected: 03/03/22 1252    Order Status: Completed Specimen: Urine from Clean catch Updated: 03/06/22 0829     Special Requests: NO SPECIAL REQUESTS        Culture result:       >100,000 COLONIES/mL PSEUDOMONAS AERUGINOSA          CULTURE, BLOOD [[id number]]  (Abnormal) Collected: 03/03/22 1338    Order Status: Completed Specimen: Blood Updated: 03/06/22 0715     Special Requests: LEFT ANTECUBITAL        GRAM STAIN GRAM NEGATIVE RODS         AEROBIC BOTTLE POSITIVE               RESULTS VERIFIED, PHONED TO AND READ BACK BY TATA MOREL RN ON 3/4/22 @0838, [address]           Culture result:       GRAM NEGATIVE RODS IDENTIFICATION AND SUSCEPTIBILITY TO FOLLOW                  Refer to Blood Culture ID Panel Accession  [id number]      CULTURE, BLOOD [[id number]] Collected: 03/06/22 0410    Order Status: Completed Specimen: Blood Updated: 03/06/22 0503    BLOOD CULTURE ID PANEL [[id number]]  (Abnormal) Collected: 03/05/22 0320    Order Status: Completed Specimen: Blood Updated: 03/05/22 0841     Acc. no. from Micro Order [id number]     Pseudomonas aeruginosa Detected        Comment: RESULTS VERIFIED, PHONED TO AND READ BACK BY  TATA MOREL RN ON 3/5/22 @6633, [facility]          KPC (Carbapenem Resistance Gene) NOT DETECTED        Comment: WARNING:  A Not Detected result for the KPC gene does not indicate susceptibility to carbapenems. Gram negative bacteria can be resistant to carbapenems by mechanisms other than carrying the KPC gene. INTERPRETATION       Gram negative craig. Identified by realtime PCR as Pseudomonas aeruginosa.           CULTURE, BLOOD [[id number]] Collected: 03/03/22 1708    Order Status: Completed Specimen: Blood Updated: 03/05/22 0733     Special Requests: --        LEFT  HAND       Culture result: NO GROWTH 2 DAYS             Imaging:   Reviewed    Signed By: Kal Bourgeois NP     March 6, 2022

## 2022-03-07 LAB
ANION GAP SERPL CALC-SCNC: 8 MMOL/L (ref 7–16)
BACTERIA SPEC CULT: ABNORMAL
BACTERIA SPEC CULT: ABNORMAL
BASOPHILS # BLD: 0.1 K/UL (ref 0–0.2)
BASOPHILS NFR BLD: 1 % (ref 0–2)
BUN SERPL-MCNC: 11 MG/DL (ref 8–23)
CALCIUM SERPL-MCNC: 8.6 MG/DL (ref 8.3–10.4)
CHLORIDE SERPL-SCNC: 104 MMOL/L (ref 98–107)
CO2 SERPL-SCNC: 25 MMOL/L (ref 21–32)
CREAT SERPL-MCNC: 0.7 MG/DL (ref 0.8–1.5)
DIFFERENTIAL METHOD BLD: ABNORMAL
EOSINOPHIL # BLD: 0.3 K/UL (ref 0–0.8)
EOSINOPHIL NFR BLD: 3 % (ref 0.5–7.8)
ERYTHROCYTE [DISTWIDTH] IN BLOOD BY AUTOMATED COUNT: 13.5 % (ref 11.9–14.6)
GLUCOSE SERPL-MCNC: 97 MG/DL (ref 65–100)
GRAM STN SPEC: ABNORMAL
HCT VFR BLD AUTO: 30.5 % (ref 41.1–50.3)
HGB BLD-MCNC: 9.9 G/DL (ref 13.6–17.2)
IMM GRANULOCYTES # BLD AUTO: 0.1 K/UL (ref 0–0.5)
IMM GRANULOCYTES NFR BLD AUTO: 1 % (ref 0–5)
LYMPHOCYTES # BLD: 1.9 K/UL (ref 0.5–4.6)
LYMPHOCYTES NFR BLD: 18 % (ref 13–44)
MCH RBC QN AUTO: 28.9 PG (ref 26.1–32.9)
MCHC RBC AUTO-ENTMCNC: 32.5 G/DL (ref 31.4–35)
MCV RBC AUTO: 89.2 FL (ref 79.6–97.8)
MONOCYTES # BLD: 1.3 K/UL (ref 0.1–1.3)
MONOCYTES NFR BLD: 13 % (ref 4–12)
NEUTS SEG # BLD: 7 K/UL (ref 1.7–8.2)
NEUTS SEG NFR BLD: 65 % (ref 43–78)
NRBC # BLD: 0 K/UL (ref 0–0.2)
PLATELET # BLD AUTO: 356 K/UL (ref 150–450)
PMV BLD AUTO: 9.1 FL (ref 9.4–12.3)
POTASSIUM SERPL-SCNC: 3.3 MMOL/L (ref 3.5–5.1)
RBC # BLD AUTO: 3.42 M/UL (ref 4.23–5.6)
SERVICE CMNT-IMP: ABNORMAL
SODIUM SERPL-SCNC: 137 MMOL/L (ref 136–145)
WBC # BLD AUTO: 10.7 K/UL (ref 4.3–11.1)

## 2022-03-07 PROCEDURE — 80048 BASIC METABOLIC PNL TOTAL CA: CPT

## 2022-03-07 PROCEDURE — 85025 COMPLETE CBC W/AUTO DIFF WBC: CPT

## 2022-03-07 PROCEDURE — 74011000258 HC RX REV CODE- 258: Performed by: INTERNAL MEDICINE

## 2022-03-07 PROCEDURE — 74011250637 HC RX REV CODE- 250/637: Performed by: FAMILY MEDICINE

## 2022-03-07 PROCEDURE — 74011250637 HC RX REV CODE- 250/637: Performed by: HOSPITALIST

## 2022-03-07 PROCEDURE — 74011250637 HC RX REV CODE- 250/637: Performed by: INTERNAL MEDICINE

## 2022-03-07 PROCEDURE — 74011250636 HC RX REV CODE- 250/636: Performed by: INTERNAL MEDICINE

## 2022-03-07 PROCEDURE — 36415 COLL VENOUS BLD VENIPUNCTURE: CPT

## 2022-03-07 PROCEDURE — 65270000029 HC RM PRIVATE

## 2022-03-07 PROCEDURE — 97530 THERAPEUTIC ACTIVITIES: CPT

## 2022-03-07 PROCEDURE — 74011000250 HC RX REV CODE- 250: Performed by: INTERNAL MEDICINE

## 2022-03-07 RX ORDER — POTASSIUM CHLORIDE 20 MEQ/1
40 TABLET, EXTENDED RELEASE ORAL
Status: COMPLETED | OUTPATIENT
Start: 2022-03-07 | End: 2022-03-07

## 2022-03-07 RX ADMIN — SODIUM CHLORIDE, PRESERVATIVE FREE 5 ML: 5 INJECTION INTRAVENOUS at 13:49

## 2022-03-07 RX ADMIN — CYANOCOBALAMIN TAB 1000 MCG 1000 MCG: 1000 TAB at 09:03

## 2022-03-07 RX ADMIN — SODIUM CHLORIDE, PRESERVATIVE FREE 5 ML: 5 INJECTION INTRAVENOUS at 22:00

## 2022-03-07 RX ADMIN — EZETIMIBE 10 MG: 10 TABLET ORAL at 09:03

## 2022-03-07 RX ADMIN — POTASSIUM CHLORIDE 40 MEQ: 20 TABLET, EXTENDED RELEASE ORAL at 09:03

## 2022-03-07 RX ADMIN — ROSUVASTATIN CALCIUM 20 MG: 20 TABLET, FILM COATED ORAL at 09:09

## 2022-03-07 RX ADMIN — TEMAZEPAM 15 MG: 15 CAPSULE ORAL at 21:12

## 2022-03-07 RX ADMIN — PREGABALIN 225 MG: 150 CAPSULE ORAL at 21:12

## 2022-03-07 RX ADMIN — ACETAMINOPHEN 650 MG: 325 TABLET ORAL at 21:13

## 2022-03-07 RX ADMIN — SODIUM CHLORIDE, PRESERVATIVE FREE 5 ML: 5 INJECTION INTRAVENOUS at 05:35

## 2022-03-07 RX ADMIN — PIPERACILLIN SODIUM AND TAZOBACTAM SODIUM 4.5 G: 4; .5 INJECTION, POWDER, LYOPHILIZED, FOR SOLUTION INTRAVENOUS at 09:09

## 2022-03-07 RX ADMIN — PIPERACILLIN SODIUM AND TAZOBACTAM SODIUM 4.5 G: 4; .5 INJECTION, POWDER, LYOPHILIZED, FOR SOLUTION INTRAVENOUS at 18:03

## 2022-03-07 RX ADMIN — ENOXAPARIN SODIUM 40 MG: 100 INJECTION SUBCUTANEOUS at 18:03

## 2022-03-07 RX ADMIN — TAMSULOSIN HYDROCHLORIDE 0.4 MG: 0.4 CAPSULE ORAL at 09:03

## 2022-03-07 RX ADMIN — PIPERACILLIN SODIUM AND TAZOBACTAM SODIUM 4.5 G: 4; .5 INJECTION, POWDER, LYOPHILIZED, FOR SOLUTION INTRAVENOUS at 02:32

## 2022-03-07 RX ADMIN — ASPIRIN 81 MG: 81 TABLET ORAL at 09:09

## 2022-03-07 RX ADMIN — PANTOPRAZOLE SODIUM 40 MG: 40 TABLET, DELAYED RELEASE ORAL at 05:35

## 2022-03-07 RX ADMIN — LEVOTHYROXINE SODIUM 50 MCG: 0.05 TABLET ORAL at 05:35

## 2022-03-07 NOTE — PROGRESS NOTES
ACUTE PHYSICAL THERAPY GOALS:  (Developed with and agreed upon by patient and/or caregiver.)  STG:  (1.)Mr. Nba Danielson will move from supine to sit and sit to supine , scoot up and down and roll side to side with STAND BY ASSIST within 4 treatment day(s). (2.)Mr. Nba Danielson will transfer from bed to chair and chair to bed with STAND BY ASSIST using the least restrictive device within 4 treatment day(s). (3.)Mr. Nba Danielson will ambulate with STAND BY ASSIST for 100 feet with the least restrictive device within 4 treatment day(s).  LTG:  (1.)Mr. Nba Danielson will move from supine to sit and sit to supine , scoot up and down and roll side to side in bed with MODIFIED INDEPENDENCE within 7 treatment day(s). (2.)Mr. Nba Danielson will transfer from bed to chair and chair to bed with MODIFIED INDEPENDENCE using the least restrictive device within 7 treatment day(s). (3.)Mr. Nba Danielson will ambulate with MODIFIED INDEPENDENCE for 200 feet with the least restrictive device within 7 treatment day(s). PHYSICAL THERAPY: Daily Note and PM Treatment Day # 2    Mj Trejo is a 68 y.o. male   PRIMARY DIAGNOSIS: Sepsis ([facility] [address]  Sepsis ([facility] [address] [A41.9]     ASSESSMENT:     REHAB RECOMMENDATIONS: CURRENT LEVEL OF FUNCTION:  (Most Recently Demonstrated)   Recommendation to date pending progress:  Setting:   Outpatient Therapy  Equipment:    None Bed Mobility:   Independent  Sit to Stand:   Independent  Transfers:   Independent  Gait/Mobility:   Standby Assistance     ASSESSMENT:  Mr. Nba Danielson presents sitting up in his bed agreeable to have therapy. He states he is feeling much better and may be going home tomorrow. He reports he has been getting up to use the bathroom or move between the bed and his bed independently . He participated in seated BLE AROM warm-up exercises then stood and ambulated 150' without an assistive device with SBA and PT pushing the IV pole.   He returned to his room to rest, then stood and worked on Arboribus, side stepping and backward ambulation. He elected to sit up in the bedside chair at the end of the treatment. Very kind and cooperative man to work with.        SUBJECTIVE:   Mr. Robert Maki states, \"It sounds like I may get to go home tomorrow\"    SOCIAL HISTORY/ LIVING ENVIRONMENT:   Home Environment: Independent living  One/Two Story Residence: Two story  Living Alone: No  Support Systems: Spouse/Significant Other,Child(madeline)  OBJECTIVE:     PAIN: VITAL SIGNS: LINES/DRAINS:   Pre Treatment: Pain Screen  Pain Scale 1: Numeric (0 - 10)  Pain Intensity 1: 0  Post Treatment: 0/10     Visit Vitals  /73 (BP 1 Location: Right upper arm, BP Patient Position: Supine)   Pulse 70   Temp 98 °F (36.7 °C)   Resp 20   Wt 94.8 kg (209 lb)   SpO2 96%   BMI 26.83 kg/m²    IV  O2 Device: None (Room air)     MOBILITY: I Mod I S SBA CGA Min Mod Max Total  NT x2 Comments:   Bed Mobility    Rolling [x] [] [] [] [] [] [] [] [] [] []    Supine to Sit [x] [] [] [] [] [] [] [] [] [] []    Scooting [x] [] [] [] [] [] [] [] [] [] []    Sit to Supine [x] [] [] [] [] [] [] [] [] [] []    Transfers    Sit to Stand [x] [] [] [] [] [] [] [] [] [] []    Bed to Chair [x] [] [] [] [] [] [] [] [] [] []    Stand to Sit [x] [] [] [] [] [] [] [] [] [] []    I=Independent, Mod I=Modified Independent, S=Supervision, SBA=Standby Assistance, CGA=Contact Guard Assistance,   Min=Minimal Assistance, Mod=Moderate Assistance, Max=Maximal Assistance, Total=Total Assistance, NT=Not Tested    BALANCE: Good Fair+ Fair Fair- Poor NT Comments   Sitting Static [x] [] [] [] [] []    Sitting Dynamic [x] [] [] [] [] []              Standing Static [x] [] [] [] [] []    Standing Dynamic [x] [] [] [] [] []      GAIT: I Mod I S SBA CGA Min Mod Max Total  NT x2 Comments:   Level of Assistance [] [] [] [x] [] [] [] [] [] [] []    Distance 150'    DME None    Gait Quality Steady and controlled gait with a normal gait pattern and functional gait speed    Weightbearing  Status N/A     I=Independent, Mod I=Modified Independent, S=Supervision, SBA=Standby Assistance, CGA=Contact Guard Assistance,   Min=Minimal Assistance, Mod=Moderate Assistance, Max=Maximal Assistance, Total=Total Assistance, NT=Not Tested    PLAN:   FREQUENCY/DURATION: PT Plan of Care: 3 times/week for duration of hospital stay or until stated goals are met, whichever comes first.  TREATMENT:     TREATMENT:   ($$ Therapeutic Activity: 23-37 mins    )  Therapeutic Activity (24 Minutes): Therapeutic activity included Rolling, Supine to Sit, Sit to Supine, Scooting, Transfer Training, Ambulation on level ground, Sitting balance , Standing balance and sit to stand to improve functional Mobility, Strength and Activity tolerance.     TREATMENT GRID:   Date:  3/7/22 Date:   Date:     Activity/Exercise Parameters Parameters Parameters   Ankle pumps 10 B     Seated knee extension 10 B     Seated hip flexion 10 B     Marching in place 10 x 2     Side stepping right/left 10 times     Backward walking 10 times             AFTER TREATMENT POSITION/PRECAUTIONS:  Chair, Needs within reach and RN notified    INTERDISCIPLINARY COLLABORATION:  RN/PCT    TOTAL TREATMENT DURATION:  PT Patient Time In/Time Out  Time In: 1318  Time Out: [address] [facility]

## 2022-03-07 NOTE — PROGRESS NOTES
MSN, cM:  Patient waiting on repeat blood cultures. Patient will be discharged with kidd catheter per urology. Bedside rounding completed with Dr. Obed Hampton today and patient has no discharge needs at this time. Case Management will continue to follow.

## 2022-03-07 NOTE — PROGRESS NOTES
Hospitalist Progress Note   Admit Date:  3/3/2022 12:30 PM   Name:  Jonnie Delgado   Age:  68 y.o. Sex:  male  :  1945   MRN:  [id number]   Room:  807/01    Presenting Complaint: No chief complaint on file. Reason(s) for Admission: Sepsis [facility]) [A41.9]     Hospital Course & Interval History:   Jonnie Delgado is a 68 y.o. male with medical history of HLP, BPH, HTN, GERD, hypothyroidism, CAD who presented with report of fevers, rigors, weakness and relative hypotension with presyncope episode. Pt s/p L2-S1 laminectomy 2/10/22 and was discharged home with Briseno catheter due to urinary tension. Patient admitted with sepsis secondary to UTI and started on empiric broad-spectrum antibiotics. Blood culture and urine culture growing gram-negative rods. Subjective/24hr Events (22): Patient is seen at the bedside. Reports feeling a lot better. No active complaints. Denies chest pain, palpitation, nausea, vomiting, abdominal pain or sob. ROS:  10 systems reviewed and negative except as noted above. Assessment & Plan:     # Sepsis secondary to CAP UTI  - empiric vanco/zosyn  - Vancomycin discontinued on 3/5  Urine culture grew Pseudomonas aeruginosa. Repeat blood culture on 3/6 till date negative. ID consulted and recommend Cipro or Levaquin on discharge to complete 14 days of abx in total.  Continue Zosyn. Anticipate discharge tomorrow if repeat bc negative for 48 hrs. # Urinary retention  - Briseno re-inserted in ED  - Continue Flomax  - Consult to urology  Urology recommend continue Briseno on discharge. Plan for cystoscopy after discharge. Patient to follow-up outpatient. # Lumbar stenosis s/p l2-s1 laminectomy 2/10  - Incision site well healed.  No focal tenderness and no overt fluid collection or abscess on CT  - doubt postoperative spine infection  - continue lyrica     # HLP  - Statin     # Hypothyroidism  - Synthroid     # HTN  - Holding home meds due to borderline BP, resume as appropriate     # Gerd  - PPI     # CAD  - continue asa/statin      Dispo/Discharge Planning:  Tomorrow if remains stable    Diet:  ADULT DIET Regular; Low Sodium (2 gm)  DVT PPx: Lovenox  Code status: Full Code    Hospital Problems as of 3/7/2022 Date Reviewed: 3/3/2022          Codes Class Noted - Resolved POA    * (Principal) Sepsis ([facility] [address] ICD-10-CM: A41.9  ICD-9-CM: 038.9, 995.91  3/3/2022 - Present Unknown        Spinal stenosis of lumbar region at multiple levels ICD-10-CM: M48.061  ICD-9-CM: 724.02  2/10/2022 - Present Yes        Coronary artery disease due to calcified coronary lesion ICD-10-CM: I25.10, I25.84  ICD-9-CM: 414.00, 414.4  11/12/2020 - Present Yes        Essential hypertension ICD-10-CM: I10  ICD-9-CM: 401.9  9/24/2020 - Present Yes        Mixed hyperlipidemia ICD-10-CM: E78.2  ICD-9-CM: 272.2  5/16/2016 - Present Yes        Hypothyroidism due to acquired atrophy of thyroid ICD-10-CM: E03.4  ICD-9-CM: 244.8, 246.8  4/15/2015 - Present Yes              Objective:     Patient Vitals for the past 24 hrs:   Temp Pulse Resp BP SpO2   03/07/22 0729 97.8 °F (36.6 °C) 74 20 128/72 94 %   03/07/22 0241 98 °F (36.7 °C) 66 19 (!) 142/83 96 %   03/06/22 2324 97.8 °F (36.6 °C) 64 19 137/72 93 %   03/06/22 2237 99.3 °F (37.4 °C)       03/06/22 2006 100 °F (37.8 °C) 81 20 (!) 147/74 95 %   03/06/22 1527 98.9 °F (37.2 °C) 66 20 135/75 96 %   03/06/22 1143 98.4 °F (36.9 °C) 71 20 116/69 96 %     Oxygen Therapy  O2 Sat (%): 94 % (03/07/22 0729)  Pulse via Oximetry: 106 beats per minute (03/03/22 1515)  O2 Device: None (Room air) (03/07/22 0241)    Estimated body mass index is 26.83 kg/m² as calculated from the following:    Height as of 2/2/22: 6' 2\" (1.88 m). Weight as of this encounter: 94.8 kg (209 lb).     Intake/Output Summary (Last 24 hours) at 3/7/2022 0952  Last data filed at 3/7/2022 0908  Gross per 24 hour   Intake 450 ml   Output 2600 ml   Net -2150 ml         Physical Exam:     Blood pressure 128/72, pulse 74, temperature 97.8 °F (36.6 °C), resp. rate 20, weight 94.8 kg (209 lb), SpO2 94 %. General:    No overt distress  Head:  Normocephalic, atraumatic  Eyes:  Sclerae appear normal.  Pupils equally round. ENT:  Nares appear normal, no drainage. Moist oral mucosa  Neck:  No restricted ROM. Trachea midline   CV:   RRR. No m/r/g. No jugular venous distension. Lungs:   CTAB. No wheezing, rhonchi, or rales. Respirations even, unlabored  Abdomen: Bowel sounds present. Soft, nontender, nondistended. Extremities: No cyanosis or clubbing. No edema  Skin:     No rashes and normal coloration. Warm and dry. Neuro:  CN II-XII grossly intact. Sensation intact. A&Ox3  Psych:  Normal mood and affect. I have reviewed ordered lab tests and independently visualized imaging below:    Recent Labs:  Recent Results (from the past 48 hour(s))   CBC WITH AUTOMATED DIFF    Collection Time: 03/05/22 11:30 AM   Result Value Ref Range    WBC 19.7 (H) 4.3 - 11.1 K/uL    RBC 3.21 (L) 4.23 - 5.6 M/uL    HGB 9.4 (L) 13.6 - 17.2 g/dL    HCT 28.6 (L) 41.1 - 50.3 %    MCV 89.1 79.6 - 97.8 FL    MCH 29.3 26.1 - 32.9 PG    MCHC 32.9 31.4 - 35.0 g/dL    RDW 14.0 11.9 - 14.6 %    PLATELET 714 312 - 911 K/uL    MPV 9.3 (L) 9.4 - 12.3 FL    ABSOLUTE NRBC 0.00 0.0 - 0.2 K/uL    DF AUTOMATED      NEUTROPHILS 78 43 - 78 %    LYMPHOCYTES 8 (L) 13 - 44 %    MONOCYTES 11 4.0 - 12.0 %    EOSINOPHILS 1 0.5 - 7.8 %    BASOPHILS 0 0.0 - 2.0 %    IMMATURE GRANULOCYTES 1 0.0 - 5.0 %    ABS. NEUTROPHILS 15.4 (H) 1.7 - 8.2 K/UL    ABS. LYMPHOCYTES 1.6 0.5 - 4.6 K/UL    ABS. MONOCYTES 2.2 (H) 0.1 - 1.3 K/UL    ABS. EOSINOPHILS 0.2 0.0 - 0.8 K/UL    ABS. BASOPHILS 0.1 0.0 - 0.2 K/UL    ABS. IMM.  GRANS. 0.3 0.0 - 0.5 K/UL   METABOLIC PANEL, BASIC    Collection Time: 03/05/22 11:30 AM   Result Value Ref Range    Sodium 136 (L) 138 - 145 mmol/L    Potassium 3.6 3.5 - 5.1 mmol/L    Chloride 106 98 - 107 mmol/L CO2 23 21 - 32 mmol/L    Anion gap 7 7 - 16 mmol/L    Glucose 118 (H) 65 - 100 mg/dL    BUN 13 8 - 23 MG/DL    Creatinine 0.80 0.8 - 1.5 MG/DL    GFR est AA >60 >60 ml/min/1.73m2    GFR est non-AA >60 >60 ml/min/1.73m2    Calcium 8.2 (L) 8.3 - 10.4 MG/DL   CBC WITH AUTOMATED DIFF    Collection Time: 03/06/22  4:10 AM   Result Value Ref Range    WBC 17.0 (H) 4.3 - 11.1 K/uL    RBC 3.69 (L) 4.23 - 5.6 M/uL    HGB 10.8 (L) 13.6 - 17.2 g/dL    HCT 33.2 (L) 41.1 - 50.3 %    MCV 90.0 79.6 - 97.8 FL    MCH 29.3 26.1 - 32.9 PG    MCHC 32.5 31.4 - 35.0 g/dL    RDW 13.8 11.9 - 14.6 %    PLATELET 664 526 - 714 K/uL    MPV 9.4 9.4 - 12.3 FL    ABSOLUTE NRBC 0.00 0.0 - 0.2 K/uL    DF AUTOMATED      NEUTROPHILS 75 43 - 78 %    LYMPHOCYTES 12 (L) 13 - 44 %    MONOCYTES 10 4.0 - 12.0 %    EOSINOPHILS 1 0.5 - 7.8 %    BASOPHILS 0 0.0 - 2.0 %    IMMATURE GRANULOCYTES 1 0.0 - 5.0 %    ABS. NEUTROPHILS 12.8 (H) 1.7 - 8.2 K/UL    ABS. LYMPHOCYTES 2.1 0.5 - 4.6 K/UL    ABS. MONOCYTES 1.8 (H) 0.1 - 1.3 K/UL    ABS. EOSINOPHILS 0.2 0.0 - 0.8 K/UL    ABS. BASOPHILS 0.1 0.0 - 0.2 K/UL    ABS. IMM.  GRANS. 0.1 0.0 - 0.5 K/UL   METABOLIC PANEL, BASIC    Collection Time: 03/06/22  4:10 AM   Result Value Ref Range    Sodium 137 136 - 145 mmol/L    Potassium 3.5 3.5 - 5.1 mmol/L    Chloride 105 98 - 107 mmol/L    CO2 25 21 - 32 mmol/L    Anion gap 7 7 - 16 mmol/L    Glucose 108 (H) 65 - 100 mg/dL    BUN 9 8 - 23 MG/DL    Creatinine 0.70 (L) 0.8 - 1.5 MG/DL    GFR est AA >60 >60 ml/min/1.73m2    GFR est non-AA >60 >60 ml/min/1.73m2    Calcium 8.8 8.3 - 10.4 MG/DL   CULTURE, BLOOD    Collection Time: 03/06/22  4:10 AM    Specimen: Blood   Result Value Ref Range    Special Requests: RIGHT  ARM        Culture result: NO GROWTH 1 DAY     CBC WITH AUTOMATED DIFF    Collection Time: 03/07/22  3:05 AM   Result Value Ref Range    WBC 10.7 4.3 - 11.1 K/uL    RBC 3.42 (L) 4.23 - 5.6 M/uL    HGB 9.9 (L) 13.6 - 17.2 g/dL    HCT 30.5 (L) 41.1 - 50.3 %    MCV 89.2 79.6 - 97.8 FL    MCH 28.9 26.1 - 32.9 PG    MCHC 32.5 31.4 - 35.0 g/dL    RDW 13.5 11.9 - 14.6 %    PLATELET 679 753 - 842 K/uL    MPV 9.1 (L) 9.4 - 12.3 FL    ABSOLUTE NRBC 0.00 0.0 - 0.2 K/uL    DF AUTOMATED      NEUTROPHILS 65 43 - 78 %    LYMPHOCYTES 18 13 - 44 %    MONOCYTES 13 (H) 4.0 - 12.0 %    EOSINOPHILS 3 0.5 - 7.8 %    BASOPHILS 1 0.0 - 2.0 %    IMMATURE GRANULOCYTES 1 0.0 - 5.0 %    ABS. NEUTROPHILS 7.0 1.7 - 8.2 K/UL    ABS. LYMPHOCYTES 1.9 0.5 - 4.6 K/UL    ABS. MONOCYTES 1.3 0.1 - 1.3 K/UL    ABS. EOSINOPHILS 0.3 0.0 - 0.8 K/UL    ABS. BASOPHILS 0.1 0.0 - 0.2 K/UL    ABS. IMM.  GRANS. 0.1 0.0 - 0.5 K/UL   METABOLIC PANEL, BASIC    Collection Time: 03/07/22  3:05 AM   Result Value Ref Range    Sodium 137 136 - 145 mmol/L    Potassium 3.3 (L) 3.5 - 5.1 mmol/L    Chloride 104 98 - 107 mmol/L    CO2 25 21 - 32 mmol/L    Anion gap 8 7 - 16 mmol/L    Glucose 97 65 - 100 mg/dL    BUN 11 8 - 23 MG/DL    Creatinine 0.70 (L) 0.8 - 1.5 MG/DL    GFR est AA >60 >60 ml/min/1.73m2    GFR est non-AA >60 >60 ml/min/1.73m2    Calcium 8.6 8.3 - 10.4 MG/DL       All Micro Results     Procedure Component Value Units Date/Time    CULTURE, BLOOD [[id number]] Collected: 03/06/22 0410    Order Status: Completed Specimen: Blood Updated: 03/07/22 0733     Special Requests: --        RIGHT  ARM       Culture result: NO GROWTH 1 DAY       CULTURE, BLOOD [[id number]] Collected: 03/03/22 1708    Order Status: Completed Specimen: Blood Updated: 03/07/22 0733     Special Requests: --        LEFT  HAND       Culture result: NO GROWTH 4 DAYS       CULTURE, BLOOD [[id number]]  (Abnormal)  (Susceptibility) Collected: 03/03/22 1338    Order Status: Completed Specimen: Blood Updated: 03/07/22 0727     Special Requests: LEFT ANTECUBITAL        GRAM STAIN GRAM NEGATIVE RODS         AEROBIC BOTTLE POSITIVE               RESULTS VERIFIED, PHONED TO AND READ BACK BY TATA MOREL RN ON 3/4/22 @7938, BETH           Culture result: PSEUDOMONAS AERUGINOSA               Refer to Blood Culture ID Panel Accession  [id number]      CULTURE, URINE [[id number]]  (Abnormal)  (Susceptibility) Collected: 03/03/22 1252    Order Status: Completed Specimen: Urine from Clean catch Updated: 03/06/22 0829     Special Requests: NO SPECIAL REQUESTS        Culture result:       >100,000 COLONIES/mL PSEUDOMONAS AERUGINOSA          BLOOD CULTURE ID PANEL [[id number]]  (Abnormal) Collected: 03/05/22 0320    Order Status: Completed Specimen: Blood Updated: 03/05/22 0841     Acc. no. from Micro Order [id number]     Pseudomonas aeruginosa Detected        Comment: RESULTS VERIFIED, PHONED TO AND READ BACK BY  TATA MOREL RN ON 3/5/22 @2018, BETH          KPC (Carbapenem Resistance Gene) NOT DETECTED        Comment: WARNING:  A Not Detected result for the KPC gene does not indicate susceptibility to carbapenems. Gram negative bacteria can be resistant to carbapenems by mechanisms other than carrying the KPC gene. INTERPRETATION       Gram negative craig. Identified by realtime PCR as Pseudomonas aeruginosa. Other Studies:  No results found.     Current Meds:  Current Facility-Administered Medications   Medication Dose Route Frequency    simethicone (MYLICON) tablet 80 mg  80 mg Oral QID PRN    morphine injection 2 mg  2 mg IntraVENous Q6H PRN    piperacillin-tazobactam (ZOSYN) 4.5 g in 0.9% sodium chloride (MBP/ADV) 100 mL MBP  4.5 g IntraVENous Q8H    sodium chloride (NS) flush 5-40 mL  5-40 mL IntraVENous Q8H    sodium chloride (NS) flush 5-40 mL  5-40 mL IntraVENous PRN    enoxaparin (LOVENOX) injection 40 mg  40 mg SubCUTAneous Q24H    tamsulosin (FLOMAX) capsule 0.4 mg  0.4 mg Oral DAILY    [Held by provider] amLODIPine (NORVASC) tablet 5 mg  5 mg Oral DAILY    aspirin delayed-release tablet 81 mg  81 mg Oral DAILY    cyanocobalamin tablet 1,000 mcg  1,000 mcg Oral DAILY    ezetimibe (ZETIA) tablet 10 mg  10 mg Oral DAILY    levothyroxine (SYNTHROID) tablet 50 mcg  50 mcg Oral ACB    [Held by provider] lisinopril-hydroCHLOROthiazide (PRINZIDE, ZESTORETIC) 20-12.5 mg per tablet 1 Tablet  1 Tablet Oral DAILY    pantoprazole (PROTONIX) tablet 40 mg  40 mg Oral ACB    potassium chloride (K-DUR, KLOR-CON M20) SR tablet 20 mEq  20 mEq Oral DAILY    pregabalin (LYRICA) capsule 225 mg  225 mg Oral QHS    rosuvastatin (CRESTOR) tablet 20 mg  20 mg Oral DAILY    sodium chloride (NS) flush 5-40 mL  5-40 mL IntraVENous Q8H    sodium chloride (NS) flush 5-40 mL  5-40 mL IntraVENous PRN    acetaminophen (TYLENOL) tablet 650 mg  650 mg Oral Q6H PRN    Or    acetaminophen (TYLENOL) suppository 650 mg  650 mg Rectal Q6H PRN    polyethylene glycol (MIRALAX) packet 17 g  17 g Oral DAILY PRN    ondansetron (ZOFRAN ODT) tablet 4 mg  4 mg Oral Q8H PRN    Or    ondansetron (ZOFRAN) injection 4 mg  4 mg IntraVENous Q6H PRN    temazepam (RESTORIL) capsule 15 mg  15 mg Oral QHS PRN       Signed:  Francesca Dominguez MD    Part of this note may have been written by using a voice dictation software. The note has been proof read but may still contain some grammatical/other typographical errors.

## 2022-03-07 NOTE — PROGRESS NOTES
SBAR from [employer], [address] [place] [address]. Patient on room air. Safety measures noted. Will continue to monitor per policy.

## 2022-03-07 NOTE — PROGRESS NOTES
Pt in bed sleeping but awakens easily. Pt denies pain or need at this time. No apparent distress noted. Call light in reach. Preparing to give report to oncoming RN.

## 2022-03-08 ENCOUNTER — APPOINTMENT (OUTPATIENT)
Dept: PHYSICAL THERAPY | Age: 77
End: 2022-03-08
Attending: ORTHOPAEDIC SURGERY
Payer: MEDICARE

## 2022-03-08 VITALS
BODY MASS INDEX: 25.96 KG/M2 | RESPIRATION RATE: 21 BRPM | HEART RATE: 81 BPM | SYSTOLIC BLOOD PRESSURE: 123 MMHG | OXYGEN SATURATION: 97 % | WEIGHT: 202.2 LBS | TEMPERATURE: 98 F | DIASTOLIC BLOOD PRESSURE: 78 MMHG

## 2022-03-08 LAB
ANION GAP SERPL CALC-SCNC: 7 MMOL/L (ref 7–16)
BACTERIA SPEC CULT: NORMAL
BASOPHILS # BLD: 0.1 K/UL (ref 0–0.2)
BASOPHILS NFR BLD: 1 % (ref 0–2)
BUN SERPL-MCNC: 14 MG/DL (ref 8–23)
CALCIUM SERPL-MCNC: 8.9 MG/DL (ref 8.3–10.4)
CHLORIDE SERPL-SCNC: 106 MMOL/L (ref 98–107)
CO2 SERPL-SCNC: 24 MMOL/L (ref 21–32)
CREAT SERPL-MCNC: 0.7 MG/DL (ref 0.8–1.5)
DIFFERENTIAL METHOD BLD: ABNORMAL
EOSINOPHIL # BLD: 0.4 K/UL (ref 0–0.8)
EOSINOPHIL NFR BLD: 4 % (ref 0.5–7.8)
ERYTHROCYTE [DISTWIDTH] IN BLOOD BY AUTOMATED COUNT: 13.5 % (ref 11.9–14.6)
GLUCOSE SERPL-MCNC: 100 MG/DL (ref 65–100)
HCT VFR BLD AUTO: 32.2 % (ref 41.1–50.3)
HGB BLD-MCNC: 10.4 G/DL (ref 13.6–17.2)
IMM GRANULOCYTES # BLD AUTO: 0.1 K/UL (ref 0–0.5)
IMM GRANULOCYTES NFR BLD AUTO: 1 % (ref 0–5)
LYMPHOCYTES # BLD: 2 K/UL (ref 0.5–4.6)
LYMPHOCYTES NFR BLD: 21 % (ref 13–44)
MCH RBC QN AUTO: 28.9 PG (ref 26.1–32.9)
MCHC RBC AUTO-ENTMCNC: 32.3 G/DL (ref 31.4–35)
MCV RBC AUTO: 89.4 FL (ref 79.6–97.8)
MONOCYTES # BLD: 1.2 K/UL (ref 0.1–1.3)
MONOCYTES NFR BLD: 12 % (ref 4–12)
NEUTS SEG # BLD: 5.8 K/UL (ref 1.7–8.2)
NEUTS SEG NFR BLD: 60 % (ref 43–78)
NRBC # BLD: 0 K/UL (ref 0–0.2)
PLATELET # BLD AUTO: 381 K/UL (ref 150–450)
PMV BLD AUTO: 9.1 FL (ref 9.4–12.3)
POTASSIUM SERPL-SCNC: 3.6 MMOL/L (ref 3.5–5.1)
RBC # BLD AUTO: 3.6 M/UL (ref 4.23–5.6)
SERVICE CMNT-IMP: NORMAL
SODIUM SERPL-SCNC: 137 MMOL/L (ref 136–145)
WBC # BLD AUTO: 9.6 K/UL (ref 4.3–11.1)

## 2022-03-08 PROCEDURE — 74011000250 HC RX REV CODE- 250: Performed by: INTERNAL MEDICINE

## 2022-03-08 PROCEDURE — 36415 COLL VENOUS BLD VENIPUNCTURE: CPT

## 2022-03-08 PROCEDURE — 97110 THERAPEUTIC EXERCISES: CPT

## 2022-03-08 PROCEDURE — 80048 BASIC METABOLIC PNL TOTAL CA: CPT

## 2022-03-08 PROCEDURE — 85025 COMPLETE CBC W/AUTO DIFF WBC: CPT

## 2022-03-08 PROCEDURE — 74011000258 HC RX REV CODE- 258: Performed by: INTERNAL MEDICINE

## 2022-03-08 PROCEDURE — 74011250637 HC RX REV CODE- 250/637: Performed by: INTERNAL MEDICINE

## 2022-03-08 PROCEDURE — 74011250636 HC RX REV CODE- 250/636: Performed by: INTERNAL MEDICINE

## 2022-03-08 RX ORDER — TAMSULOSIN HYDROCHLORIDE 0.4 MG/1
0.4 CAPSULE ORAL DAILY
Qty: 14 CAPSULE | Refills: 0 | Status: SHIPPED | OUTPATIENT
Start: 2022-03-09 | End: 2022-03-23

## 2022-03-08 RX ORDER — LEVOFLOXACIN 750 MG/1
750 TABLET ORAL DAILY
Qty: 9 TABLET | Refills: 0 | Status: SHIPPED | OUTPATIENT
Start: 2022-03-08 | End: 2022-03-17

## 2022-03-08 RX ORDER — TEMAZEPAM 15 MG/1
15 CAPSULE ORAL
Qty: 7 CAPSULE | Refills: 0 | Status: SHIPPED | OUTPATIENT
Start: 2022-03-08 | End: 2022-03-15

## 2022-03-08 RX ADMIN — SODIUM CHLORIDE, PRESERVATIVE FREE 10 ML: 5 INJECTION INTRAVENOUS at 05:48

## 2022-03-08 RX ADMIN — POTASSIUM CHLORIDE 20 MEQ: 20 TABLET, EXTENDED RELEASE ORAL at 08:24

## 2022-03-08 RX ADMIN — SODIUM CHLORIDE, PRESERVATIVE FREE 5 ML: 5 INJECTION INTRAVENOUS at 14:00

## 2022-03-08 RX ADMIN — TAMSULOSIN HYDROCHLORIDE 0.4 MG: 0.4 CAPSULE ORAL at 08:24

## 2022-03-08 RX ADMIN — PIPERACILLIN SODIUM AND TAZOBACTAM SODIUM 4.5 G: 4; .5 INJECTION, POWDER, LYOPHILIZED, FOR SOLUTION INTRAVENOUS at 02:50

## 2022-03-08 RX ADMIN — CYANOCOBALAMIN TAB 1000 MCG 1000 MCG: 1000 TAB at 08:24

## 2022-03-08 RX ADMIN — PIPERACILLIN SODIUM AND TAZOBACTAM SODIUM 4.5 G: 4; .5 INJECTION, POWDER, LYOPHILIZED, FOR SOLUTION INTRAVENOUS at 09:32

## 2022-03-08 RX ADMIN — EZETIMIBE 10 MG: 10 TABLET ORAL at 08:23

## 2022-03-08 RX ADMIN — ASPIRIN 81 MG: 81 TABLET ORAL at 08:24

## 2022-03-08 RX ADMIN — PANTOPRAZOLE SODIUM 40 MG: 40 TABLET, DELAYED RELEASE ORAL at 05:47

## 2022-03-08 RX ADMIN — LEVOTHYROXINE SODIUM 50 MCG: 0.05 TABLET ORAL at 05:47

## 2022-03-08 RX ADMIN — SODIUM CHLORIDE, PRESERVATIVE FREE 10 ML: 5 INJECTION INTRAVENOUS at 06:00

## 2022-03-08 RX ADMIN — ROSUVASTATIN CALCIUM 20 MG: 20 TABLET, FILM COATED ORAL at 08:23

## 2022-03-08 NOTE — PROGRESS NOTES
MSN, CM:  Patient to be discharged home today with no services ordered or requested. Patient declined [place] or outpatient PT. Patient states he will continue his exercises at home and then follow up with his outpatient PT. Patient and family agree with this discharge plan. Patient has met all milestones for this admission. Family to transport patient home. Care Management Interventions  PCP Verified by CM: Yes (Dr. Mary Ramirez)  Mode of Transport at Discharge:  Other (see comment) (family to transport )  Health Maintenance Reviewed: Yes  Physical Therapy Consult: Yes  Support Systems: Spouse/Significant Other,Child(madeline)  Confirm Follow Up Transport: Self  Freedom of Choice List was Provided with Basic Dialogue that Supports the Patient's Individualized Plan of Care/Goals, Treatment Preferences and Shares the Quality Data Associated with the Providers?: Yes  Discharge Location  Patient Expects to be Discharged to[de-identified] Home

## 2022-03-08 NOTE — PROGRESS NOTES
Patient resting in bed with no pain or distress at this time. Patient on RA with RR even/unlabored. Briseno cath patent and draining clear yellow urine output. Safety in place with call light in reach and will continue to assess.

## 2022-03-08 NOTE — PROGRESS NOTES
Tylenol 650 mg po given for abdominal pain with pain level at 4 and will assess medication for effectiveness.  Call light in reach

## 2022-03-08 NOTE — PROGRESS NOTES
PT offered treatment this morning prior to discharge but patient politely declined. Getting last IV then preparing for home discharge.       JEREMY PachecoT

## 2022-03-08 NOTE — DISCHARGE SUMMARY
Hospitalist Discharge Summary   Admit Date:  3/3/2022 12:30 PM   DC Note date: 3/8/2022  Name:  Silvia Mcmillan   Age:  68 y.o. Sex:  male  :  1945   MRN:  [id number]   Room:  [facility]  PCP:  Trinh Coleman MD    Presenting Complaint: No chief complaint on file. Initial Admission Diagnosis: [facility] ([facility] [A41.9]     Problem List for this Hospitalization:  Hospital Problems as of 3/8/2022 Date Reviewed: 3/3/2022          Codes Class Noted - Resolved POA    * (Principal) Sepsis ([facility] ICD-10-CM: A41.9  ICD-9-CM: 038.9, 995.91  3/3/2022 - Present Unknown        Spinal stenosis of lumbar region at multiple levels ICD-10-CM: M48.061  ICD-9-CM: 724.02  2/10/2022 - Present Yes        Coronary artery disease due to calcified coronary lesion ICD-10-CM: I25.10, I25.84  ICD-9-CM: 414.00, 414.4  2020 - Present Yes        Essential hypertension ICD-10-CM: I10  ICD-9-CM: 401.9  2020 - Present Yes        Mixed hyperlipidemia ICD-10-CM: E78.2  ICD-9-CM: 272.2  2016 - Present Yes        Hypothyroidism due to acquired atrophy of thyroid ICD-10-CM: E03.4  ICD-9-CM: 244.8, 246.8  4/15/2015 - Present Yes              Hospital Course:  Richi Cabrera a 68 y. o. male with medical history of HLP, BPH, HTN, GERD, hypothyroidism, CAD who presented with report of fevers, rigors, weakness and relative hypotension with presyncope episode. Pt s/p L2-S1 laminectomy 2/10/22 and was discharged home with Briseno catheter due to urinary tension. Patient admitted with sepsis secondary to UTI and started on empiric broad-spectrum antibiotics. Blood culture and urine culture positive for Pseudomonas aeruginosa. Repeat blood culture 3/6/2022 till date negative. ID consulted and recommend Cipro and Levaquin on discharge to complete 14 days of antibiotic in total.    Briseno's catheter reinserted on admission. Urology consulted and recommend continue Briseno on discharge. Plan for cystoscopy after discharge.   Patient to follow-up outpatient with urology. All other chronic conditions stable and we continued essential home meds. Patient is stable to discharge home today with close follow-up with PCP and urology. Disposition: Home Health Care [facility]  Diet: ADULT DIET Regular; Low Sodium (2 gm)  Code Status: Full Code    Follow Up Orders: Follow-up Appointments   Procedures    FOLLOW UP VISIT Appointment in: 3 - 5 Days PCP Urology in 1 week     PCP  Urology in 1 week     Standing Status:   Standing     Number of Occurrences:   1     Order Specific Question:   Appointment in     Answer:   3 - 5 Days       Follow-up Information     Follow up With Specialties Details Why Contact Info    Erwin Manley MD Internal Medicine On 3/16/2022 arrive at 2:10 pm for a 2:20 appointment  [place] [id number]  [phone]      [address] [place] [address]  On 3/17/2022 10:30 am  [address] [place] [id number] [phone]          Time spent in patient discharge and coordination 36 minutes. Plan was discussed with patient. All questions answered. Patient was stable at time of discharge. Instructions given to call a physician or return if any concerns. Discharge Info:   Current Discharge Medication List      START taking these medications    Details   levoFLOXacin (LEVAQUIN) 750 mg tablet Take 1 Tablet by mouth daily for 9 days. Qty: 9 Tablet, Refills: 0  Start date: 3/8/2022, End date: 3/17/2022      temazepam (RESTORIL) 15 mg capsule Take 1 Capsule by mouth nightly as needed for Sleep for up to 7 days. Max Daily Amount: 15 mg.  Qty: 7 Capsule, Refills: 0  Start date: 3/8/2022, End date: 3/15/2022    Associated Diagnoses: Lumbar radicular pain      tamsulosin (FLOMAX) 0.4 mg capsule Take 1 Capsule by mouth daily for 14 days.   Qty: 14 Capsule, Refills: 0  Start date: 3/9/2022, End date: 3/23/2022         CONTINUE these medications which have NOT CHANGED    Details   silodosin (Rapaflo) 8 mg capsule Take 1 Capsule by mouth daily (with breakfast). Qty: 30 Capsule, Refills: 1  Start date: 3/3/2022    Associated Diagnoses: Urinary retention      phenazopyridine (PYRIDIUM) 100 mg tablet Take 1 Tablet by mouth three (3) times daily as needed for Pain. Qty: 10 Tablet, Refills: 0    Associated Diagnoses: Dysuria      acetaminophen (Tylenol Extra Strength) 500 mg tablet Take 1,000 mg by mouth every six (6) hours as needed (breakthrough pain). pregabalin (LYRICA) 225 mg capsule Take 1 Capsule by mouth two (2) times a day. Max Daily Amount: 450 mg.  Qty: 60 Capsule, Refills: 1    Associated Diagnoses: Disc degeneration, lumbar; Lumbar radiculopathy; Lumbar spondylosis      rosuvastatin (CRESTOR) 20 mg tablet Take 1 tablet by mouth nightly  Qty: 90 Tablet, Refills: 0    Associated Diagnoses: Coronary artery disease due to calcified coronary lesion; Mixed hyperlipidemia      lisinopril-hydroCHLOROthiazide (Zestoretic) 20-12.5 mg per tablet Take 1 Tablet by mouth daily. Qty: 90 Tablet, Refills: 1    Associated Diagnoses: Coronary artery disease due to calcified coronary lesion; Essential hypertension      levothyroxine (SYNTHROID) 50 mcg tablet Take 1 Tablet by mouth Daily (before breakfast). Qty: 90 Tablet, Refills: 1    Associated Diagnoses: Hypothyroidism due to acquired atrophy of thyroid      amLODIPine (NORVASC) 5 mg tablet Take 1 Tablet by mouth daily. Qty: 90 Tablet, Refills: 1    Associated Diagnoses: Essential hypertension      potassium chloride (K-DUR, KLOR-CON) 20 mEq tablet Take 1 Tablet by mouth daily. Qty: 90 Tablet, Refills: 1    Associated Diagnoses: Hypokalemia      ezetimibe (ZETIA) 10 mg tablet Take 1 Tablet by mouth daily. Qty: 90 Tablet, Refills: 1    Associated Diagnoses: Mixed hyperlipidemia      omeprazole (PRILOSEC) 40 mg capsule Take 1 Capsule by mouth daily. 30 minutes prior to the largest meal of the day.   Qty: 90 Capsule, Refills: 1    Associated Diagnoses: Gastroesophageal reflux disease without esophagitis      aspirin delayed-release 81 mg tablet Take 1 Tab by mouth daily. Qty: 1 Tab, Refills: 0    Associated Diagnoses: Coronary artery disease due to calcified coronary lesion      multivitamin (ONE A DAY) tablet Take 1 Tab by mouth daily. cyanocobalamin 1,000 mcg tablet Take 1,000 mcg by mouth daily. Procedures done this admission:  * No surgery found *    Consults this admission:  IP CONSULT TO UROLOGY  IP CONSULT TO INFECTIOUS DISEASES    Echocardiogram/EKG results:  No results found for this or any previous visit. EKG Results     Procedure 720 Value Units Date/Time    EKG 12 LEAD INITIAL [[id number]]     Order Status: Completed           Diagnostic Imaging/Tests:   CT ABD PELV W CONT    Result Date: 3/3/2022  CT ABDOMEN AND PELVIS WITH CONTRAST HISTORY:  79-year-old male patient presenting to the ER with reports of generalized ill feeling, inability urinate, lightheadedness and near syncopal episode. Patient reports feeling dizzy and lightheaded -and shaky all over. TECHNIQUE: Helically acquired images were obtained from the domes of the diaphragms to the ischial tuberosities reconstructed at 5mm intervals after the uneventful administration of 100c's of intravenous Isovue-370 in order to better evaluate the solid abdominal viscera and vascular structures. Oral contrast was not administered per the emergency imaging BMI protocol. Coronal and sagittal reformatted images were submitted. Radiation dose reduction techniques were used for this study:  Our CT scanners use one or all of the following: Automated exposure control, adjustment of the mA and/or kVp according to patient's size, iterative reconstruction. COMPARISON: 11/05/2015 CT ABDOMEN: There is mild dependent atelectatic changes within the lower lobes posteriorly.  Stable low-density lesions are present within the liver with the larger most compatible with a cyst measuring up to 1.1 cm. There is a 9 mm stone within the gallbladder neck, unchanged. There is a stable right adrenal gland nodule measuring 2.2 cm. The pancreas is unremarkable. There is a 1.5 cm cyst at the midpole right kidney. There is a punctate nonobstructing calculus at the midpole right kidney. No adenopathy or ascites is present. There are no inflammatory changes. Atherosclerotic changes are present involving the abdominal aorta. There are postoperative changes of the lumbar spine status post posterior decompression from at L2, L3 and L5. No suspicious gas and fluid collection is identified CT PELVIS: No adenopathy or ascites is present. There are no inflammatory changes. A Briseno catheter is present within the urinary bladder. The prostate gland is enlarged measuring approximately 4.6 x 6.5 x 6.0 cm in AP, transverse and craniocaudal dimensions, respectively. No aggressive osseous lesion is present. There is a mild fecal impaction in the rectum. 1. Enlarged prostate gland. 2. Cholelithiasis. 3. Stable right adrenal gland mass. 4. Postoperative changes of the lumbar spine without definite fluid collection/abscess identified. XR CHEST PORT    Result Date: 3/3/2022  Portable chest: History: Severe SOB Comparison: None Findings: A single view of the chest was obtained at 12:57 PM. The cardiac silhouette is normal in size and configuration. The lungs and pleural spaces are clear. The pulmonary vascularity is within normal limits. Unremarkable portable chest radiograph.        All Micro Results     Procedure Component Value Units Date/Time    CULTURE, BLOOD [[id number]] Collected: 03/06/22 0410    Order Status: Completed Specimen: Blood Updated: 03/08/22 0811     Special Requests: --        RIGHT  ARM       Culture result: NO GROWTH 2 DAYS       CULTURE, BLOOD [[id number]] Collected: 03/03/22 1708    Order Status: Completed Specimen: Blood Updated: 03/08/22 0811     Special Requests: --        LEFT  HAND       Culture result: NO GROWTH 5 DAYS       CULTURE, BLOOD [[id number]]  (Abnormal)  (Susceptibility) Collected: 03/03/22 1338    Order Status: Completed Specimen: Blood Updated: 03/07/22 0727     Special Requests: LEFT ANTECUBITAL        GRAM STAIN GRAM NEGATIVE RODS         AEROBIC BOTTLE POSITIVE               RESULTS VERIFIED, PHONED TO AND READ BACK BY TATA MOREL RN ON 3/4/22 @0838, [facility]           Culture result: PSEUDOMONAS AERUGINOSA               Refer to Blood Culture ID Panel Accession  [id number]      CULTURE, URINE [[id number]]  (Abnormal)  (Susceptibility) Collected: 03/03/22 1252    Order Status: Completed Specimen: Urine from Clean catch Updated: 03/06/22 0829     Special Requests: NO SPECIAL REQUESTS        Culture result:       >100,000 COLONIES/mL PSEUDOMONAS AERUGINOSA          BLOOD CULTURE ID PANEL [[id number]]  (Abnormal) Collected: 03/05/22 0320    Order Status: Completed Specimen: Blood Updated: 03/05/22 0841     Acc. no. from Micro Order [id number]     Pseudomonas aeruginosa Detected        Comment: RESULTS VERIFIED, PHONED TO AND READ BACK BY  TATA MOREL RN ON 3/5/22 @8017, [facility]          KPC (Carbapenem Resistance Gene) NOT DETECTED        Comment: WARNING:  A Not Detected result for the KPC gene does not indicate susceptibility to carbapenems. Gram negative bacteria can be resistant to carbapenems by mechanisms other than carrying the KPC gene. INTERPRETATION       Gram negative craig. Identified by realtime PCR as Pseudomonas aeruginosa.                 Labs: Results:       BMP, Mg, Phos Recent Labs     03/08/22 0342 03/07/22  0305 03/06/22  0410    137 137   K 3.6 3.3* 3.5    104 105   CO2 24 25 25   AGAP 7 8 7   BUN 14 11 9   CREA 0.70* 0.70* 0.70*   CA 8.9 8.6 8.8    97 108*      CBC Recent Labs     03/08/22 0342 03/07/22  0305 03/06/22  0410   WBC 9.6 10.7 17.0*   RBC 3.60* 3.42* 3.69*   HGB 10.4* 9.9* 10.8*   HCT 32.2* 30.5* 33.2*    356 362   GRANS 60 65 75   LYMPH 21 18 12*   EOS 4 3 1   MONOS 12 13* 10   BASOS 1 1 0   IG 1 1 1   ANEU 5.8 7.0 12.8*   ABL 2.0 1.9 2.1   SIMON 0.4 0.3 0.2   ABM 1.2 1.3 1.8*   ABB 0.1 0.1 0.1   AIG 0.1 0.1 0.1      LFT No results for input(s): ALT, TBIL, AP, TP, ALB, GLOB, AGRAT in the last 72 hours.     No lab exists for component: SGOT, GPT   Cardiac Testing No results found for: BNPP, BNP, CPK, RCK1, RCK2, RCK3, RCK4, CKMB, CKNDX, CKND1, TROPT, TROIQ   Coagulation Tests No results found for: PTP, INR, APTT, INREXT   A1c Lab Results   Component Value Date/Time    Hemoglobin A1c 6.2 02/02/2022 01:15 PM    Hemoglobin A1c 6.4 (H) 08/13/2021 10:53 AM    Hemoglobin A1c 6.2 (H) 02/12/2021 11:47 AM      Lipid Panel Lab Results   Component Value Date/Time    Cholesterol, total 98 (L) 08/13/2021 10:53 AM    HDL Cholesterol 34 (L) 08/13/2021 10:53 AM    LDL, calculated 44 08/13/2021 10:53 AM    LDL, calculated 114 (H) 01/20/2020 10:39 AM    VLDL, calculated 20 08/13/2021 10:53 AM    VLDL, calculated 24 01/20/2020 10:39 AM    Triglyceride 103 08/13/2021 10:53 AM    CHOL/HDL Ratio 5.0 01/11/2017 08:42 AM      Thyroid Panel Lab Results   Component Value Date/Time    TSH 2.940 08/13/2021 10:53 AM    TSH 3.570 02/12/2021 11:47 AM        Most Recent UA Lab Results   Component Value Date/Time    Color YELLOW 03/03/2022 12:52 PM    Appearance CLEAR 03/03/2022 12:52 PM    pH (UA) 6.5 03/03/2022 12:52 PM    Protein Negative 03/03/2022 12:52 PM    Glucose Negative 03/03/2022 12:52 PM    Ketone Negative 03/03/2022 12:52 PM    Bilirubin Negative 03/03/2022 12:52 PM    Blood SMALL (A) 03/03/2022 12:52 PM    Urobilinogen 0.2 03/03/2022 12:52 PM    Nitrites Negative 03/03/2022 12:52 PM    Leukocyte Esterase SMALL (A) 03/03/2022 12:52 PM    WBC 20-50 03/03/2022 12:52 PM    RBC 5-10 03/03/2022 12:52 PM    Epithelial cells 0 03/03/2022 12:52 PM    Bacteria 1+ (H) 03/03/2022 12:52 PM    Casts 0-3 03/03/2022 12:52 PM          All Labs from Last 24 Hrs:  Recent Results (from the past 24 hour(s))   CBC WITH AUTOMATED DIFF    Collection Time: 03/08/22  3:42 AM   Result Value Ref Range    WBC 9.6 4.3 - 11.1 K/uL    RBC 3.60 (L) 4.23 - 5.6 M/uL    HGB 10.4 (L) 13.6 - 17.2 g/dL    HCT 32.2 (L) 41.1 - 50.3 %    MCV 89.4 79.6 - 97.8 FL    MCH 28.9 26.1 - 32.9 PG    MCHC 32.3 31.4 - 35.0 g/dL    RDW 13.5 11.9 - 14.6 %    PLATELET 997 951 - 114 K/uL    MPV 9.1 (L) 9.4 - 12.3 FL    ABSOLUTE NRBC 0.00 0.0 - 0.2 K/uL    DF AUTOMATED      NEUTROPHILS 60 43 - 78 %    LYMPHOCYTES 21 13 - 44 %    MONOCYTES 12 4.0 - 12.0 %    EOSINOPHILS 4 0.5 - 7.8 %    BASOPHILS 1 0.0 - 2.0 %    IMMATURE GRANULOCYTES 1 0.0 - 5.0 %    ABS. NEUTROPHILS 5.8 1.7 - 8.2 K/UL    ABS. LYMPHOCYTES 2.0 0.5 - 4.6 K/UL    ABS. MONOCYTES 1.2 0.1 - 1.3 K/UL    ABS. EOSINOPHILS 0.4 0.0 - 0.8 K/UL    ABS. BASOPHILS 0.1 0.0 - 0.2 K/UL    ABS. IMM.  GRANS. 0.1 0.0 - 0.5 K/UL   METABOLIC PANEL, BASIC    Collection Time: 03/08/22  3:42 AM   Result Value Ref Range    Sodium 137 136 - 145 mmol/L    Potassium 3.6 3.5 - 5.1 mmol/L    Chloride 106 98 - 107 mmol/L    CO2 24 21 - 32 mmol/L    Anion gap 7 7 - 16 mmol/L    Glucose 100 65 - 100 mg/dL    BUN 14 8 - 23 MG/DL    Creatinine 0.70 (L) 0.8 - 1.5 MG/DL    GFR est AA >60 >60 ml/min/1.73m2    GFR est non-AA >60 >60 ml/min/1.73m2    Calcium 8.9 8.3 - 10.4 MG/DL       Current Med List in Hospital:   Current Facility-Administered Medications   Medication Dose Route Frequency    simethicone (MYLICON) tablet 80 mg  80 mg Oral QID PRN    morphine injection 2 mg  2 mg IntraVENous Q6H PRN    piperacillin-tazobactam (ZOSYN) 4.5 g in 0.9% sodium chloride (MBP/ADV) 100 mL MBP  4.5 g IntraVENous Q8H    sodium chloride (NS) flush 5-40 mL  5-40 mL IntraVENous Q8H    sodium chloride (NS) flush 5-40 mL  5-40 mL IntraVENous PRN    enoxaparin (LOVENOX) injection 40 mg  40 mg SubCUTAneous Q24H    tamsulosin (FLOMAX) capsule 0.4 mg  0.4 mg Oral DAILY    [Held by provider] amLODIPine (NORVASC) tablet 5 mg  5 mg Oral DAILY    aspirin delayed-release tablet 81 mg  81 mg Oral DAILY    cyanocobalamin tablet 1,000 mcg  1,000 mcg Oral DAILY    ezetimibe (ZETIA) tablet 10 mg  10 mg Oral DAILY    levothyroxine (SYNTHROID) tablet 50 mcg  50 mcg Oral ACB    [Held by provider] lisinopril-hydroCHLOROthiazide (PRINZIDE, ZESTORETIC) 20-12.5 mg per tablet 1 Tablet  1 Tablet Oral DAILY    pantoprazole (PROTONIX) tablet 40 mg  40 mg Oral ACB    potassium chloride (K-DUR, KLOR-CON M20) SR tablet 20 mEq  20 mEq Oral DAILY    pregabalin (LYRICA) capsule 225 mg  225 mg Oral QHS    rosuvastatin (CRESTOR) tablet 20 mg  20 mg Oral DAILY    sodium chloride (NS) flush 5-40 mL  5-40 mL IntraVENous Q8H    sodium chloride (NS) flush 5-40 mL  5-40 mL IntraVENous PRN    acetaminophen (TYLENOL) tablet 650 mg  650 mg Oral Q6H PRN    Or    acetaminophen (TYLENOL) suppository 650 mg  650 mg Rectal Q6H PRN    polyethylene glycol (MIRALAX) packet 17 g  17 g Oral DAILY PRN    ondansetron (ZOFRAN ODT) tablet 4 mg  4 mg Oral Q8H PRN    Or    ondansetron (ZOFRAN) injection 4 mg  4 mg IntraVENous Q6H PRN    temazepam (RESTORIL) capsule 15 mg  15 mg Oral QHS PRN       No Known Allergies  Immunization History   Administered Date(s) Administered    COVID-19, Pfizer Purple top, DILUTE for use, 12+ yrs, 30mcg/0.3mL dose 03/31/2021, 04/21/2021    Influenza High Dose Vaccine PF 09/20/2016, 10/10/2017, 10/25/2018, 09/29/2020, 10/01/2021    Pneumococcal Conjugate (PCV-13) 05/16/2016    Pneumococcal Polysaccharide (PPSV-23) 01/14/2010, 05/25/2017       Recent Vital Data:  Patient Vitals for the past 24 hrs:   Temp Pulse Resp BP SpO2   03/08/22 1032 97.7 °F (36.5 °C) 76 20 125/83 96 %   03/08/22 0736 97.7 °F (36.5 °C) 73 21 121/73 94 %   03/08/22 0351 97.5 °F (36.4 °C) 61 19 (!) 140/76 96 %   03/07/22 2318 98.2 °F (36.8 °C) (!) 57 18 128/72 96 %   03/07/22 1949 98.3 °F (36.8 °C) 72 20 130/68 94 %   03/07/22 1455 98.7 °F (37.1 °C) 73 20 116/76 96 % Oxygen Therapy  O2 Sat (%): 96 % (03/08/22 1032)  Pulse via Oximetry: 106 beats per minute (03/03/22 1515)  O2 Device: None (Room air) (03/07/22 1935)    Estimated body mass index is 25.96 kg/m² as calculated from the following:    Height as of 2/2/22: 6' 2\" (1.88 m). Weight as of this encounter: 91.7 kg (202 lb 3.2 oz). Intake/Output Summary (Last 24 hours) at 3/8/2022 1445  Last data filed at 3/8/2022 1352  Gross per 24 hour   Intake 420 ml   Output 1775 ml   Net -1355 ml         Physical Exam:    General:    Well nourished. No overt distress  Head:  Normocephalic, atraumatic  Eyes:  Sclerae appear normal.  Pupils equally round. HENT:  Nares appear normal, no drainage. Moist mucous membranes  Neck:  No restricted ROM. Trachea midline  CV:   RRR. No m/r/g. No JVD  Lungs:   CTAB. No wheezing, rhonchi, or rales. Even, unlabored  Abdomen:   Soft, nontender, nondistended. Extremities: Warm and dry. No cyanosis or clubbing. No edema. Skin:     No rashes. Normal coloration  Neuro:  CN II-XII grossly intact. Psych:  Normal mood and affect. Signed:  Frankie Muñoz MD    Part of this note may have been written by using a voice dictation software. The note has been proof read but may still contain some grammatical/other typographical errors.

## 2022-03-08 NOTE — PROGRESS NOTES
ACUTE OT GOALS:  (Developed with and agreed upon by patient and/or caregiver.)    1. Patient will complete lower body bathing and dressing with MOD I and adaptive equipment as needed. 2. Patient will complete toileting with MOD I.   3. Patient will tolerate 30 minutes of OT treatment with 1-2 rest breaks to increase activity tolerance for ADLs. 4. Patient will complete functional transfers with MOD I and adaptive equipment as needed. 5. Patient will complete functional mobility for household distances with MOD I and adaptive equipment as needed. 6. Patient will complete self-grooming while standing edge of sink with MOD I and adaptive equipment as needed.     Timeframe: 7 visits     OCCUPATIONAL THERAPY: Daily Note OT Treatment Day # 2    Sara Krishna is a 68 y.o. male   PRIMARY DIAGNOSIS: Sepsis ([facility]  Sepsis ([facility] [A41.9]       [place] / [place] / Product Type: Medicare /   ASSESSMENT:     REHAB RECOMMENDATIONS: CURRENT LEVEL OF FUNCTION:  (Most Recently Demonstrated)   Recommendation to date pending progress:  Setting:   No further skilled therapy after discharge from hospital  Equipment:    None Bathing:   Not tested  Dressing:   Not tested  Feeding/Grooming:   Not tested  Toileting:   Not tested  Functional Mobility:   Not tested     ASSESSMENT:  Mr. Janet Fink is doing fair today. Pt presents supine upon arrival receiving last IV before discharge today. Pt instructed in UE exercises to increase strength and activity tolerance to perform ADLs. Pt also issued HEP today. Pt is to be discharged home today. No further needs needed.      SUBJECTIVE:   Mr. Janet Fink states, \"I have been doing my bathing at the sink\"    SOCIAL HISTORY/LIVING ENVIRONMENT:   Home Environment: Independent living  One/Two Story Residence: Two story  Living Alone: No  Support Systems: Spouse/Significant Other,Child(madeline)    OBJECTIVE:     PAIN: VITAL SIGNS: LINES/DRAINS:   Pre Treatment: Pain Screen  Pain Scale 1: Numeric (0 - 10)  Pain Intensity 1: 0  Post Treatment: 0   IV  O2 Device: None (Room air)     ACTIVITIES OF DAILY LIVING: I Mod I S SBA CGA Min Mod Max Total NT Comments   BASIC ADLs:              Bathing/ Showering [] [] [] [] [] [] [] [] [] [x]    Toileting [] [] [] [] [] [] [] [] [] [x]    Dressing [] [] [] [] [] [] [] [] [] [x]    Feeding [] [] [] [] [] [] [] [] [] [x]    Grooming [] [] [] [] [] [] [] [] [] [x]    Personal Device Care [] [] [] [] [] [] [] [] [] [x]    Functional Mobility [] [] [] [] [] [] [] [] [] [x]    I=Independent, Mod I=Modified Independent, S=Supervision, SBA=Standby Assistance, CGA=Contact Guard Assistance,   Min=Minimal Assistance, Mod=Moderate Assistance, Max=Maximal Assistance, Total=Total Assistance, NT=Not Tested    MOBILITY: I Mod I S SBA CGA Min Mod Max Total  NT x2 Comments:   Supine to sit [] [] [] [] [] [] [] [] [] [x] []    Sit to supine [] [] [] [] [] [] [] [] [] [x] []    Sit to stand [] [] [] [] [] [] [] [] [] [x] []    Bed to chair [] [] [] [] [] [] [] [] [] [x] []    I=Independent, Mod I=Modified Independent, S=Supervision, SBA=Standby Assistance, CGA=Contact Guard Assistance,   Min=Minimal Assistance, Mod=Moderate Assistance, Max=Maximal Assistance, Total=Total Assistance, NT=Not Tested    BALANCE: Good Fair+ Fair Fair- Poor NT Comments   Sitting Static [x] [] [] [] [] []    Sitting Dynamic [x] [] [] [] [] []              Standing Static [] [] [] [] [] [x]    Standing Dynamic [] [] [] [] [] [x]      PLAN:   FREQUENCY/DURATION: OT Plan of Care: 3 times/week for duration of hospital stay or until stated goals are met, whichever comes first.    TREATMENT:   TREATMENT:   ( $$ Therapeutic Exercises: 8-22 mins   )  Therapeutic Exercise (10 Minutes): Therapeutic exercises noted below to improve functional activity tolerance, strength and mobility.      TREATMENT GRID:   Date:  3/8/22 Date:   Date:     Activity/Exercise Parameters Parameters Parameters Shoulder Abd/Adduction 10 reps     Shoulder Flexion (up to 90 degrees) 10 reps     Punches  10 reps     Arm Circles 10 reps     Elbow Flexion 10 reps                   AFTER TREATMENT POSITION/PRECAUTIONS:  Bed, Needs within reach and RN notified    INTERDISCIPLINARY COLLABORATION:  RN/PCT and OT/THORPE    TOTAL TREATMENT DURATION:  OT Patient Time In/Time Out  Time In: 1050  Time Out: 253 [facility] Shanda Baer

## 2022-03-08 NOTE — PROGRESS NOTES
Restoril 15 mg po given for sleep per patient request.  Will assess med for effectiveness and call light in reach

## 2022-03-08 NOTE — PROGRESS NOTES
Patient resting in bed with no pain or distress noted. Patient remains on RA with RR even/unlabored. All needs met and patient call light in reach. Will prepare bedside report for oncoming nurse.

## 2022-03-11 LAB
BACTERIA SPEC CULT: NORMAL
SERVICE CMNT-IMP: NORMAL

## 2022-03-16 PROBLEM — A41.9 SEPSIS (HCC): Status: RESOLVED | Noted: 2022-03-03 | Resolved: 2022-03-16

## 2022-03-18 PROBLEM — R93.1 ELEVATED CORONARY ARTERY CALCIUM SCORE: Status: ACTIVE | Noted: 2020-09-03

## 2022-03-18 PROBLEM — G62.9 AXONAL POLYNEUROPATHY: Status: ACTIVE | Noted: 2018-12-19

## 2022-03-18 PROBLEM — R01.1 SYSTOLIC MURMUR: Status: ACTIVE | Noted: 2020-09-03

## 2022-03-18 PROBLEM — G62.9 PERIPHERAL POLYNEUROPATHY: Status: ACTIVE | Noted: 2020-09-24

## 2022-03-18 PROBLEM — M25.551 PAIN OF RIGHT HIP JOINT: Status: ACTIVE | Noted: 2019-06-20

## 2022-03-19 PROBLEM — M48.00 SPINAL STENOSIS: Status: ACTIVE | Noted: 2022-02-10

## 2022-03-19 PROBLEM — M79.604 RIGHT LEG PAIN: Status: ACTIVE | Noted: 2018-12-19

## 2022-03-19 PROBLEM — I77.89 ENLARGED THORACIC AORTA (HCC): Status: ACTIVE | Noted: 2020-09-03

## 2022-03-19 PROBLEM — M54.16 LUMBAR RADICULAR PAIN: Status: ACTIVE | Noted: 2019-06-20

## 2022-03-19 PROBLEM — M48.061 SPINAL STENOSIS OF LUMBAR REGION AT MULTIPLE LEVELS: Status: ACTIVE | Noted: 2022-02-10

## 2022-03-19 PROBLEM — I25.10 CORONARY ARTERY DISEASE DUE TO CALCIFIED CORONARY LESION: Status: ACTIVE | Noted: 2020-11-12

## 2022-03-19 PROBLEM — I25.84 CORONARY ARTERY DISEASE DUE TO CALCIFIED CORONARY LESION: Status: ACTIVE | Noted: 2020-11-12

## 2022-03-19 PROBLEM — R20.2 PARESTHESIA OF RIGHT FOOT: Status: ACTIVE | Noted: 2018-12-19

## 2022-03-20 PROBLEM — A41.9 SEPSIS (HCC): Status: RESOLVED | Noted: 2022-03-03 | Resolved: 2022-03-16

## 2022-03-20 PROBLEM — I10 ESSENTIAL HYPERTENSION: Status: ACTIVE | Noted: 2020-09-24

## 2022-03-23 ENCOUNTER — HOSPITAL ENCOUNTER (OUTPATIENT)
Dept: PHYSICAL THERAPY | Age: 77
Discharge: HOME | End: 2022-03-23
Attending: ORTHOPAEDIC SURGERY
Payer: MEDICARE

## 2022-03-23 PROCEDURE — 97110 THERAPEUTIC EXERCISES: CPT

## 2022-03-23 PROCEDURE — 97161 PT EVAL LOW COMPLEX 20 MIN: CPT

## 2022-03-23 NOTE — THERAPY EVALUATION
Hossein Lua  : 1945  [place]  Secondary: [address] [facility] at [facility] ELAINE CHAVEZ  [address] [place] [address],  Enrique Matta.  [phone]   [phone]         OUTPATIENT PHYSICAL THERAPY:Initial Assessment 3/23/2022   ICD-10: Treatment Diagnosis: Difficulty in walking, not elsewhere classified (R26.2); Low back pain, unspecified (M54.5)  Precautions/Allergies:   Patient has no known allergies. TREATMENT PLAN:  Effective Dates: 3/23/2022 TO 2022. Frequency/Duration: 1-2 visits per week for 8 weeks MEDICAL/REFERRING DIAGNOSIS:  Encounter for follow-up examination after completed treatment for conditions other than malignant neoplasm [Z09]   DATE OF ONSET: Surgery: 2/10/2022  REFERRING PHYSICIAN: Iraj Dee MD MD Orders: Evaluate and treat. Include modalities of soft tissue mobilization and dry needling. Return MD Appointment: TBD     INITIAL ASSESSMENT:  Mr. Cali Pickard presents to physical therapy following a laminectomy at L2-S1, which was further complicated by a hospital stay for sepsis due to a UTI. He exhibits neural tension in R LE, gluteal weakness, and reduced lower extremity flexibility. These impairments likely contribute to his difficulty with prolonged ambulation and altered gait mechanics. Patient is likely to benefit from PT intervention to improve his gait performance and endurance, and facilitate return to pre-surgery levels of activity participation. PROBLEM LIST (Impacting functional limitations):  1. Decreased Strength  2. Decreased Ambulation Ability/Technique  3. Increased Pain  4. Decreased Activity Tolerance  5. Decreased Flexibility/Joint Mobility  6. Decreased [place] with Home Exercise Program INTERVENTIONS PLANNED: (Treatment may consist of any combination of the following)  1. Home Exercise Program (HEP)  2. Manual Therapy  3. Neuromuscular Re-education/Strengthening  4.  Range of Motion (ROM)  5. Therapeutic Activites  6. Therapeutic Exercise/Strengthening  7. Ultrasound (US)  8. Dry needling     GOALS: (Goals have been discussed and agreed upon with patient.)  Short-Term Functional Goals: Time Frame: 4 weeks  1. Patient will report 50% improvement in distal R LE symptoms. 2. Patient will be able to walk for 30 minutes until he has to take a rest break. 3. Patient will have 0/10 R foot/LE discomfort at rest.  4. Patient will have 4/5 hip extensor strength bilaterally with manual muscle testing. Discharge Goals: Time Frame: 8 weeks  1. Patient will be able to walk for >2 miles without having to stop due to back or R LE pain. 2. Patient will report centralization of R LE symptoms. 3. Patient will have 5/5 hip extensor strength bilaterally. 4. Patient will be independent with HEP. OUTCOME MEASURE:   Tool Used: Modified Oswestry Low Back Pain Questionnaire  Score:  Initial: 18/50 or 36% limited Most Recent: X/50 (Date: -- )   Interpretation of Score: Each section is scored on a 0-5 scale, 5 representing the greatest disability. The scores of each section are added together for a total score of 50. MEDICAL NECESSITY:   · Skilled intervention continues to be required due to lower back and R LE pain and weakness that impairs his mobility. .  REASON FOR SERVICES/OTHER COMMENTS:  · Patient continues to require skilled intervention due to lower back and R LE pain that interferes with his mobility. .  Total Duration: 38 minutes  PT Patient Time In/Time Out  Time In: 1300  Time Out: 1338    Rehabilitation Potential For Stated Goals: Good  Regarding Ernie Hurtado's therapy, I certify that the treatment plan above will be carried out by a therapist or under their direction.   Thank you for this referral,    Ej Armenta PT       Referring Physician Signature: Chato Schuster MD _______________________________ Date _____________      PAIN/SUBJECTIVE:   Initial:   2/10 R foot Post Session: Not rated   HISTORY:   History of Injury/Illness (Reason for Referral):  Patient underwent bilateral lumbar laminectomy at L2, L3, L4, L5 and S1 bilateral laminectomy, partial facetectomy and foraminotomy on 2/10/2022 to relieve R leg pain and weakness he was experiencing. Previously he was reporting that he could walk for exercise, but if he had to stand for a prolonged period of time he would feel heaviness in his R leg. Patient reports that since the surgery he has felt R leg burning and pain, and that if he walks for 15 minutes he has to sit down due to pain. Both legs feel very weak too. Has not had any falls. After his surgery, he developed bladder problems due to some of the medications he was own, and stayed in the hospital for 5 days. He was released, and then developed sepsis following a UTI, and was hospitalized again. He wants to get stronger and get back to walking for exercise. Past Medical History/Comorbidities: per EMR  Mr. 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 He has no past medical history of Malignant hyperthermia due to anesthesia or Pseudocholinesterase deficiency. Mr. Otf Blankenship  has a past surgical history that includes hx orthopaedic; hx colonoscopy (2020); and hx endoscopy. Social History/Living Environment:    Patient lives with her spouse. Prior Level of Function/Work/Activity:  Patient previously walked for exercise. He is retired.      Ambulatory/Rehab Services H2 Model Falls Risk Assessment   Risk Factors:       (1)  Gender [Male] Ability to Rise from Chair:       (0)  Ability to rise in a single movement   Falls Prevention Plan:       No modifications necessary   Total: (5 or greater = High Risk): 1   ©2010 [facility] of [facility]. All Rights Reserved. [facility] Patent #[id number]. Federal Law prohibits the replication, distribution or use without written permission from [facility] of [facility]   Current Medications: per EMR      Current Outpatient Medications:     rosuvastatin (CRESTOR) 20 mg tablet, Take 1 Tablet by mouth nightly., Disp: 90 Tablet, Rfl: 0    levothyroxine (SYNTHROID) 50 mcg tablet, Take 1 Tablet by mouth Daily (before breakfast). , Disp: 90 Tablet, Rfl: 0    amLODIPine (NORVASC) 5 mg tablet, Take 1 Tablet by mouth daily. , Disp: 90 Tablet, Rfl: 0    potassium chloride (K-DUR, KLOR-CON M20) 20 mEq tablet, Take 1 Tablet by mouth daily. , Disp: 90 Tablet, Rfl: 0    ezetimibe (ZETIA) 10 mg tablet, Take 1 Tablet by mouth daily. , Disp: 90 Tablet, Rfl: 0    omeprazole (PRILOSEC) 40 mg capsule, Take 1 Capsule by mouth daily. 30 minutes prior to the largest meal of the day., Disp: 90 Capsule, Rfl: 0    lisinopril-hydroCHLOROthiazide (Zestoretic) 20-12.5 mg per tablet, Take 1 Tablet by mouth daily. , Disp: 90 Tablet, Rfl: 0    tamsulosin (FLOMAX) 0.4 mg capsule, Take 1 Capsule by mouth daily for 14 days. , Disp: 14 Capsule, Rfl: 0    acetaminophen (Tylenol Extra Strength) 500 mg tablet, Take 1,000 mg by mouth every six (6) hours as needed (breakthrough pain). , Disp: , Rfl:     pregabalin (LYRICA) 225 mg capsule, Take 1 Capsule by mouth two (2) times a day. Max Daily Amount: 450 mg. (Patient taking differently: Take 225 mg by mouth nightly.), Disp: 60 Capsule, Rfl: 1    aspirin delayed-release 81 mg tablet, Take 1 Tab by mouth daily. , Disp: 1 Tab, Rfl: 0    multivitamin (ONE A DAY) tablet, Take 1 Tab by mouth daily. , Disp: , Rfl:    cyanocobalamin 1,000 mcg tablet, Take 1,000 mcg by mouth daily. , Disp: , Rfl:    Date Last Reviewed:  3/23/2022   Number of Personal Factors/Comorbidities that affect the Plan of Care: 3+: HIGH COMPLEXITY   EXAMINATION:   3/23/2022    Observation/Orthostatic Postural Assessment:          Patient ambulates with reduced gait speed. Wide base of support. Reduced hip extension bilaterally, and toes out when ambulating. Independent with transfers, bed mobility and ambulation. Lumbar incision is well healed. Palpation:          Sensation to light touch intact to bilateral LEs along all dermatomes. ROM:          Lumbar motion grossly within functional limits. Not formally assessed due to surgical restrictions of twisting and bending. Hip AROM grossly WFL B  Strength:          Hip flexion 5/5 B        Hip abduction 5/5 B        Hip extension 4-/5 B        Knee extension 5/5 B        Knee flexion 5/5 B        Ankle DF 5/5 B  Special Tests:       Positive slump test to R LE, negative to L LE; positive straight leg raise to R, negative straight leg raise on L     Body Structures Involved:  1. Nerves  2. Bones  3. Joints  4. Muscles  5. Ligaments Body Functions Affected:  1. Sensory/Pain  2. Neuromusculoskeletal  3. Movement Related Activities and Participation Affected:  1. General Tasks and Demands  2. Mobility  3.  Domestic Life   Number of elements (examined above) that affect the Plan of Care: 4+: HIGH COMPLEXITY   CLINICAL PRESENTATION:   Presentation: Stable and uncomplicated: LOW COMPLEXITY   CLINICAL DECISION MAKING:   Use of outcome tool(s) and clinical judgement create a POC that gives a: Clear prediction of patient's progress: LOW COMPLEXITY

## 2022-03-24 NOTE — PROGRESS NOTES
German Norris  : 1945  [place] / Product Type: Medicare /  [address] at [facility] ELAINE CHAVEZ  [address] [facility], [address], [place] [address].  [phone]   [phone]       OUTPATIENT PHYSICAL THERAPY: Daily Treatment Note 3/24/2022  Visit Count: 1     ICD-10: Treatment Diagnosis: Difficulty in walking, not elsewhere classified (R26.2); Low back pain, unspecified (M54.5)  Precautions/Allergies:   Patient has no known allergies. TREATMENT PLAN:  Effective Dates: 3/23/2022 TO 2022  Frequency/Duration: 1-2 visits per week for 8 weeks MEDICAL/REFERRING DIAGNOSIS:  Encounter for follow-up examination after completed treatment for conditions other than malignant neoplasm [Z09]   DATE OF ONSET: Surgery: 2/10/2022  REFERRING PHYSICIAN: Suzette Navas MD MD Orders: Evaluate and treat. Include modalities of soft tissue mobilization and dry needling. Return MD Appointment: TBD       Pre-treatment Symptoms/Complaints:  See initial evaluation note for details. Pain: Initial:   2/10 R foot Post Session:  Not rated   Medications Last Reviewed:  3/23/2022    Updated Objective Findings:   See evaluation note from today     TREATMENT:     THERAPEUTIC EXERCISE (15 minutes) to improve lower back/trunk strength and improve B LE strength. Patient performed posterior pelvic tilts with glute sets for improved hip and trunk strength x 10, clamshells in side-lying x 10 each, and sciatic nerve glides in sitting x 10 to each LE for reduced neural tension. HEP: Patient received handout for the exercises above. [facility] Portal    Treatment/Session Summary:    · Response to Treatment:  Patient did well with exercises. .  · Communication/Consultation:  None today  · Equipment provided today:  None today  · Recommendations/Intent for next treatment session: Next visit will focus on improving B LE strength and reducing discomfort.     Total Treatment Billable Duration:  15 minutes for therex + 23 minutes for evaluation  PT Patient Time In/Time Out  Time In: 1300  Time Out: Romulo Burton, PT    Future Appointments   Date Time Provider Cheyenne Martell   3/25/2022 12:30 PM [facility]   3/28/2022  2:00 PM Brando Hay., PT [facility]   3/29/2022 10:30 AM Elliot Nava NP [id number] PGU   3/30/2022  2:30 PM Mg Grewal, PTA [facility]   4/4/2022  9:30 AM Stacie Collazo MD [id number] PGU   4/5/2022  2:45 PM Brando Hay., PT [facility]   4/7/2022 11:15 AM Brando Hay., PT [facility]   4/11/2022 11:15 AM Brando Hay., PT [facility]   4/13/2022  9:45 AM Susana Bergeron MD POAG POA   4/13/2022 11:15 AM Brando Hay., PT [facility]   4/18/2022 10:30 AM Brando Hay., PT [facility]   4/20/2022 10:45 AM [facility]   4/22/2022 10:40 AM Mg Hobbs MD [facility]   9/20/2022 11:00 AM Mg Hobbs MD [facility]

## 2022-03-25 ENCOUNTER — HOSPITAL ENCOUNTER (OUTPATIENT)
Dept: PHYSICAL THERAPY | Age: 77
Discharge: HOME | End: 2022-03-25
Attending: ORTHOPAEDIC SURGERY
Payer: MEDICARE

## 2022-03-25 PROCEDURE — 97110 THERAPEUTIC EXERCISES: CPT

## 2022-03-25 NOTE — PROGRESS NOTES
Devi Mcpherson  : 1945  [place] / Product Type: Medicare /  [address] at [facility] ELAINE CHAVEZ  [address] [place] [address], Suite [facility], [place] [address].  [phone]   [phone]       OUTPATIENT PHYSICAL THERAPY: Daily Treatment Note 3/25/2022  Visit Count: 2     ICD-10: Treatment Diagnosis: Difficulty in walking, not elsewhere classified (R26.2); Low back pain, unspecified (M54.5)  Precautions/Allergies:   Patient has no known allergies. TREATMENT PLAN:  Effective Dates: 3/23/2022 TO 2022  Frequency/Duration: 1-2 visits per week for 8 weeks MEDICAL/REFERRING DIAGNOSIS:  Encounter for follow-up examination after completed treatment for conditions other than malignant neoplasm [Z09]   DATE OF ONSET: Surgery: 2/10/2022  REFERRING PHYSICIAN: Felipe Horowitz MD MD Orders: Evaluate and treat. Include modalities of soft tissue mobilization and dry needling. Return MD Appointment: TBD       Pre-treatment Symptoms/Complaints: Pt reports walking this morning for half an hour. Still has mild pain and radicular signs and symptoms in the R LE. Pain: Initial:   2/10 R LE Post Session:  No increase reported   Medications Last Reviewed:  3/23/2022    Updated Objective Findings:        TREATMENT:     Therapeutic Exercise: ( 40 minutes):  Exercises per grid below to improve mobility and strength. Required moderate visual and tactile cues to promote proper body mechanics. Progressed repetitions as indicated. Therapist assisted LE nerve glides x15.      Date:  3/25/22 Date:   Date:     Activity/Exercise Parameters Parameters Parameters   Supine pelvic tilts 5 sec hold x 10     Low glute bridge 3x10 with glute squeeze     Supine B SLR 2x10     Side lying hip abduction 2x10 B     B clamshells in side lying 3x10     Supine nerve flossing to sciatic nerve 2x10     Standing gastroc stretch 20 sec hold x 4 B at incline board     Log rolling (bed mobility) Reviewed log rolling with pt and he demonstrated twice. To protect lumbar spine and decrease pain     Sit to stand 3x10 from mat table         HEP: Continue HEP from 3/23/22. [employer] Portal    Treatment/Session Summary:    · Response to Treatment: Pt did not report increased pain but had a brief moment of dizziness when transferring from sitting to standing. Pt requires SBA with transfers due to reports of dizziness. Reviewed log rolling to protect the lumbar spine. · Communication/Consultation:  None today  · Equipment provided today:  None today  · Recommendations/Intent for next treatment session: Next visit will focus on improving B LE strength and reducing discomfort.     Total Treatment Billable Duration: 45 mins  PT Patient Time In/Time Out  Time In: 0068  Time Out: [address], PTA    Future Appointments   Date Time Provider Cheyenne Martell   3/28/2022  2:00 PM [facility]., [facility]   3/29/2022 10:30 AM Brando Mahoney NP [id number] PGU   3/30/2022  2:30 PM Alejandro Lovelace P[facility]   4/4/2022  9:30 AM Dequan Phillips MD [id number] PGU   4/5/2022  2:45 PM Marnette Broccoli., [facility]   4/7/2022 11:15 AM Marnette Broccoli., PT [facility]   4/11/2022 11:15 AM [facility]arnette Broccoli., [facility]   4/13/2022  9:45 AM Delmi Ling MD POAG POA   4/13/2022 11:15 AM Marnette Broccoli., PT [facility]   4/18/2022 10:30 AM Marnette Broccoli., [facility]   4/20/2022 10:45 AM Marnette Broccoli., [facility]   4/22/2022 10:40 AM Jonah Asher MD [facility]   9/20/2022 11:00 AM Jonah Asher MD [facility]

## 2022-03-28 ENCOUNTER — HOSPITAL ENCOUNTER (OUTPATIENT)
Dept: PHYSICAL THERAPY | Age: 77
Discharge: HOME | End: 2022-03-28
Attending: ORTHOPAEDIC SURGERY
Payer: MEDICARE

## 2022-03-28 PROCEDURE — 97110 THERAPEUTIC EXERCISES: CPT

## 2022-03-28 NOTE — PROGRESS NOTES
Alexis Gregg  : 1945  [place] / Product Type: Medicare /  [address] at [facility] ELAINE CHAVEZ  [address] [place] [address], [address], [place] [address].  [phone]   [phone]       OUTPATIENT PHYSICAL THERAPY: Daily Treatment Note 3/28/2022  Visit Count: 3     ICD-10: Treatment Diagnosis: Difficulty in walking, not elsewhere classified (R26.2); Low back pain, unspecified (M54.5)  Precautions/Allergies:   Patient has no known allergies. TREATMENT PLAN:  Effective Dates: 3/23/2022 TO 2022  Frequency/Duration: 1-2 visits per week for 8 weeks MEDICAL/REFERRING DIAGNOSIS:  Encounter for follow-up examination after completed treatment for conditions other than malignant neoplasm [Z09]   DATE OF ONSET: Surgery: 2/10/2022  REFERRING PHYSICIAN: Jakub Goddard MD MD Orders: Evaluate and treat. Include modalities of soft tissue mobilization and dry needling. Return MD Appointment: TBD       Pre-treatment Symptoms/Complaints: Pt reports walking this morning for 3/4 of a mile. Pain: Initial:   10 R LE Post Session:  No increase reported   Medications Last Reviewed:  3/28/22    Updated Objective Findings:        TREATMENT:     Therapeutic Exercise: ( 40 minutes):  Exercises per grid below to improve mobility and strength. Required moderate visual and tactile cues to promote proper body mechanics. Progressed repetitions as indicated. Therapist assisted LE nerve glides x15.      Date:  3/25/22 Date:  3/28/22 Date:     Activity/Exercise Parameters Parameters Parameters   Supine pelvic tilts 5 sec hold x 10 5 sec hold x 15    Low glute bridge 3x10 with glute squeeze 3x10 with ball between the knees    Supine B SLR 2x10 2x15 B    Side lying hip abduction 2x10 B 2x10 B    B clamshells in side lying 3x10 2x10 B    Supine nerve flossing to sciatic nerve 2x10 See above    Standing gastroc stretch 20 sec hold x 4 B at incline board 30 sec hold x 2 B Log rolling (bed mobility) Reviewed log rolling with pt and he demonstrated twice. To protect lumbar spine and decrease pain Verbally reviewed    Sit to stand 3x10 from mat table 3x10 from mat table    Standing exercises to promote good posture  Scapula retraction and shoulder ext 3x10 blue band in staggered stance         HEP: Continue HEP from 3/23/22. [employer] Portal    Treatment/Session Summary:    · Response to Treatment: Pt did not report increased pain with activity. He requires verbal cues for correct body mechanics. · Communication/Consultation:  None today  · Equipment provided today:  None today  · Recommendations/Intent for next treatment session: Next visit will focus on improving B LE strength and reducing discomfort.     Total Treatment Billable Duration: 45 mins  PT Patient Time In/Time Out  Time In: 1300  Time Out: 1345    Tha Izquierdo PTA    Future Appointments   Date Time Provider Cheyenne Martell   3/29/2022 10:30 AM Therese Aguirre NP [id number] PGU   3/30/2022  2:30 PM Jemma Coburn PTA [facility]   4/4/2022  9:30 AM William Handley MD [id number] PGU   4/5/2022  2:45 PM Dylon Oates., PT [facility]   4/7/2022 11:15 AM Dylon Oates., PT [facility]   4/11/2022 11:15 AM Dylon Oates., PT [facility]   4/13/2022  9:45 AM Myrna Zaidi MD POAG POA   4/13/2022 11:15 AM Dylon Oates., PT [facility]   4/18/2022 10:30 AM Dylon Oates., PT [facility]   4/20/2022 10:45 AM Dylon Oates., PT [facility]   4/22/2022 10:40 AM Elvin Vinson MD [facility]   9/20/2022 11:00 AM Elvin Vinson MD [facility]

## 2022-03-30 ENCOUNTER — HOSPITAL ENCOUNTER (OUTPATIENT)
Dept: PHYSICAL THERAPY | Age: 77
Discharge: HOME | End: 2022-03-30
Attending: ORTHOPAEDIC SURGERY
Payer: MEDICARE

## 2022-03-30 PROCEDURE — 97110 THERAPEUTIC EXERCISES: CPT

## 2022-03-30 NOTE — PROGRESS NOTES
Sera Cuba  : 1945  [place] / Product Type: Medicare /  [address] at [facility] ELAINE CHAVEZ  [address] [place] [address], [address], [place] [address].  [phone]   [phone]       OUTPATIENT PHYSICAL THERAPY: Daily Treatment Note 3/30/2022  Visit Count: 4     ICD-10: Treatment Diagnosis: Difficulty in walking, not elsewhere classified (R26.2); Low back pain, unspecified (M54.5)  Precautions/Allergies:   Patient has no known allergies. TREATMENT PLAN:  Effective Dates: 3/23/2022 TO 2022  Frequency/Duration: 1-2 visits per week for 8 weeks MEDICAL/REFERRING DIAGNOSIS:  Encounter for follow-up examination after completed treatment for conditions other than malignant neoplasm [Z09]   DATE OF ONSET: Surgery: 2/10/2022  REFERRING PHYSICIAN: Lisbeth Sr MD MD Orders: Evaluate and treat. Include modalities of soft tissue mobilization and dry needling. Return MD Appointment: TBD       Pre-treatment Symptoms/Complaints: Pt reports walking this morning for 3/4 of a mile. Pt reports mild tingling and pain in the R foot. Pain: Initial:   1/10 R LE Post Session:  No increase reported   Medications Last Reviewed:  3/28/22    Updated Objective Findings:        TREATMENT:     Therapeutic Exercise: ( 40 minutes):  Exercises per grid below to improve mobility and strength. Required moderate visual and tactile cues to promote proper body mechanics. Progressed repetitions as indicated. Therapist assisted LE nerve glides x15.      Date:  3/25/22 Date:  3/28/22 Date:  3/30/22   Activity/Exercise Parameters Parameters Parameters   Supine pelvic tilts 5 sec hold x 10 5 sec hold x 15 5 sec hold x15 with LE on stability ball   Low glute bridge 3x10 with glute squeeze 3x10 with ball between the knees 3x10 with LEs on stability ball   Supine B SLR 2x10 2x15 B 2x10 B   Side lying hip abduction 2x10 B 2x10 B 2x10 B   B clamshells in side lying 3x10 2x10 B 3x10 B   Supine nerve flossing to sciatic nerve 2x10 See above 2x10 B LE   Standing gastroc stretch 20 sec hold x 4 B at incline board 30 sec hold x 2 B    Log rolling (bed mobility) Reviewed log rolling with pt and he demonstrated twice. To protect lumbar spine and decrease pain Verbally reviewed    Heel raises   2x10 standing   Step ups   x15 B LE onto 6 in box, no UE support   Sit to stand 3x10 from mat table 3x10 from mat table 3x10 from mat table   Standing exercises to promote good posture  Scapula retraction and shoulder ext 3x10 blue band in staggered stance         HEP: Continue HEP from 3/23/22. [employer] Portal    Treatment/Session Summary:    · Response to Treatment: Pt is making good progress. Continue to progress functional strength training. · Communication/Consultation:  None today  · Equipment provided today:  None today  · Recommendations/Intent for next treatment session: Next visit will focus on improving B LE strength and reducing discomfort.     Total Treatment Billable Duration: 45 mins  PT Patient Time In/Time Out  Time In: 1430  Time Out: 1515    Jade Marrero PTA    Future Appointments   Date Time Provider Cheyenne Aguiari   4/4/2022  9:30 AM Kaitlyn Murillo MD [id number] PGU   4/5/2022  2:45 PM Wynelle Allegra., PT [facility]   4/7/2022 11:15 AM Wynelle Allegra., PT [facility]   4/11/2022 11:15 AM Wynelle Allegra., PT [facility]   4/13/2022  9:45 AM Amalia Hamm MD [facility] POA   4/13/2022 11:15 AM Wynelle Allegra., PT [facility]   4/18/2022 10:30 AM Wynelle Allegra., PT [facility]   4/20/2022 10:45 AM Wynelle Allegra., PT [facility]   4/22/2022 10:40 AM Jayesh Washington MD [facility]   9/20/2022 11:00 AM Jayesh Washington MD [facility]

## 2022-04-05 ENCOUNTER — HOSPITAL ENCOUNTER (OUTPATIENT)
Dept: PHYSICAL THERAPY | Age: 77
Discharge: HOME | End: 2022-04-05
Attending: ORTHOPAEDIC SURGERY
Payer: MEDICARE

## 2022-04-05 PROCEDURE — 97110 THERAPEUTIC EXERCISES: CPT

## 2022-04-07 ENCOUNTER — HOSPITAL ENCOUNTER (OUTPATIENT)
Dept: PHYSICAL THERAPY | Age: 77
Discharge: HOME | End: 2022-04-07
Attending: ORTHOPAEDIC SURGERY
Payer: MEDICARE

## 2022-04-07 PROCEDURE — 97110 THERAPEUTIC EXERCISES: CPT

## 2022-04-07 NOTE — PROGRESS NOTES
Sandy Bains  : 1945  [place] / Product Type: Medicare /  [address] at [facility] ELAINE CHAVEZ  [address] [facility], [address], [place] [address].  [phone]   [phone]       OUTPATIENT PHYSICAL THERAPY: Daily Treatment Note 2022  Visit Count: 6     ICD-10: Treatment Diagnosis: Difficulty in walking, not elsewhere classified (R26.2); Low back pain, unspecified (M54.5)  Precautions/Allergies:   Patient has no known allergies. TREATMENT PLAN:  Effective Dates: 3/23/2022 TO 2022  Frequency/Duration: 1-2 visits per week for 8 weeks MEDICAL/REFERRING DIAGNOSIS:  Encounter for follow-up examination after completed treatment for conditions other than malignant neoplasm [Z09]   DATE OF ONSET: Surgery: 2/10/2022  REFERRING PHYSICIAN: Sky Mchugh MD MD Orders: Evaluate and treat. Include modalities of soft tissue mobilization and dry needling. Return MD Appointment: TBD       Pre-treatment Symptoms/Complaints: R leg was numb earlier today, but this was short lived. He has minimal discomfort to R leg and feels that his leg is getting stronger. Pain: Initial:   1/10 R LE Post Session:  No increase reported   Medications Last Reviewed:  3/28/22    Updated Objective Findings:   None today     TREATMENT:     Therapeutic Exercise: ( 40 minutes):  Exercises per grid below to improve mobility and strength. Required moderate visual and tactile cues to promote proper body mechanics. Progressed repetitions as indicated. Therapist assisted LE nerve glides x15.      Date:  3/25/22 Date:  3/28/22 Date:  3/30/22 Date  22 Date  22   Activity/Exercise Parameters Parameters Parameters     Supine pelvic tilts 5 sec hold x 10 5 sec hold x 15 5 sec hold x15 with LE on stability ball X 20, 5 sec hold in hook lyingg X 20, 5 sec hold in hook lying    X 20 with hip adductor ball squeezes   Low glute bridge 3x10 with glute squeeze 3x10 with ball between the knees 3x10 with LEs on stability ball 3 x 10 with LEs on stability ball 3 x 10 with LEs   Supine B SLR 2x10 2x15 B 2x10 B -    Side lying hip abduction 2x10 B 2x10 B 2x10 B 3 x 10 B 3 x 10 B   B clamshells in side lying 3x10 2x10 B 3x10 B     Supine nerve flossing to sciatic nerve 2x10 See above 2x10 B LE R LE See above   Standing gastroc stretch 20 sec hold x 4 B at incline board 30 sec hold x 2 B  1 minute    Log rolling (bed mobility) Reviewed log rolling with pt and he demonstrated twice. To protect lumbar spine and decrease pain Verbally reviewed      Heel raises   2x10 standing 2 x 10 standing 2 x 10 standing   Step ups   x15 B LE onto 6 in box, no UE support 6 inch  3 x 6-8 w/o support 6\" x 20 B   Sit to stand 3x10 from mat table 3x10 from mat table 3x10 from mat table 3 x 8-10 from standard chair, hands on thighs 2 x 10 from standard chair   Standing exercises to promote good posture  Scapula retraction and shoulder ext 3x10 blue band in staggered stance   Scapular retractions   3 x 10 black, feet staggered Fwd weight shifts  2 x 15    Tandem stance  3 x 20\" B                                HEP: Continue HEP. [employer] Portal    Treatment/Session Summary:    · Response to Treatment: Continues to exhibit good performance with exercises. R LE strength appears to be progressing. · Communication/Consultation:  None today  · Equipment provided today:  None today  · Recommendations/Intent for next treatment session: Next visit will focus on improving B LE strength and reducing discomfort.     Total Treatment Billable Duration: 40 mins  PT Patient Time In/Time Out  Time In: 1115  Time Out: [address] [facility],[address], PT    Future Appointments   Date Time Provider Cheyenne Martell   4/7/2022 12:30 PM SPENCER PHONE APPOINTMENT [facility]   4/11/2022 11:15 AM Mian Ruvalcaba, PT [facility]   4/13/2022  9:45 AM [facility]   4/13/2022 11:15 AM Mian Ruvalcaba, PT [facility]   4/22/2022 10:40 AM Rosa Jerez MD [facility]   9/20/2022 11:00 AM Rosa Jerez MD [facility]

## 2022-04-11 ENCOUNTER — HOSPITAL ENCOUNTER (OUTPATIENT)
Dept: PHYSICAL THERAPY | Age: 77
Discharge: HOME | End: 2022-04-11
Attending: ORTHOPAEDIC SURGERY
Payer: MEDICARE

## 2022-04-11 PROCEDURE — 97140 MANUAL THERAPY 1/> REGIONS: CPT

## 2022-04-11 PROCEDURE — 97110 THERAPEUTIC EXERCISES: CPT

## 2022-04-11 NOTE — PROGRESS NOTES
Ann Marie Avilez  : 1945  [place] / Product Type: Medicare /  [address]  at [facility] ELAINE CHAVEZ  [address] [place] [address], [address], [place] [address].  [phone]   [phone]       OUTPATIENT PHYSICAL THERAPY: Daily Treatment Note 2022  Visit Count: 7     ICD-10: Treatment Diagnosis: Difficulty in walking, not elsewhere classified (R26.2); Low back pain, unspecified (M54.5)  Precautions/Allergies:   Patient has no known allergies. TREATMENT PLAN:  Effective Dates: 3/23/2022 TO 2022  Frequency/Duration: 1-2 visits per week for 8 weeks MEDICAL/REFERRING DIAGNOSIS:  Encounter for follow-up examination after completed treatment for conditions other than malignant neoplasm [Z09]   DATE OF ONSET: Surgery: 2/10/2022  REFERRING PHYSICIAN: Arvel Moritz, MD MD Orders: Evaluate and treat. Include modalities of soft tissue mobilization and dry needling. Return MD Appointment: TBD       Pre-treatment Symptoms/Complaints; Continues to feel pain when walking uphill to R LE. R LE has gotten somewhat stronger but still fatigues quickly. Pain: Initial:   Not rated Post Session:  Not rated   Medications Last Reviewed:  3/28/22    Updated Objective Findings:   None today     TREATMENT:     Manual Therapy (15 minutes): Soft tissue mobilizations to reduce R piriformis tightness. Therapeutic Exercise: ( 25 minutes):  Exercises per grid below to improve mobility and strength. Required moderate visual and tactile cues to promote proper body mechanics. Progressed repetitions as indicated. Therapist assisted LE nerve glides x15. Stretches to R gluteals in supine to reduce tightness to R piriformis.      Date:  3/25/22 Date:  3/28/22 Date:  3/30/22 Date  22 Date  22 Date  22   Activity/Exercise Parameters Parameters Parameters      Supine pelvic tilts 5 sec hold x 10 5 sec hold x 15 5 sec hold x15 with LE on stability ball X 20, 5 sec hold in hook lyingg X 20, 5 sec hold in hook lying    X 20 with hip adductor ball squeezes X 20, 5 sec     Low glute bridge 3x10 with glute squeeze 3x10 with ball between the knees 3x10 with LEs on stability ball 3 x 10 with LEs on stability ball 3 x 10 with LEs 3 x 10 B LEs   Supine B SLR 2x10 2x15 B 2x10 B -     Side lying hip abduction 2x10 B 2x10 B 2x10 B 3 x 10 B 3 x 10 B 3 x 10 B   B clamshells in side lying 3x10 2x10 B 3x10 B      Supine nerve flossing to sciatic nerve 2x10 See above 2x10 B LE R LE  See above   Standing gastroc stretch 20 sec hold x 4 B at incline board 30 sec hold x 2 B  1 minute     Log rolling (bed mobility) Reviewed log rolling with pt and he demonstrated twice. To protect lumbar spine and decrease pain Verbally reviewed       Heel raises   2x10 standing 2 x 10 standing 2 x 10 standing    Step ups   x15 B LE onto 6 in box, no UE support 6 inch  3 x 6-8 w/o support 6\" x 20 B 6\" x 20 R   Sit to stand 3x10 from mat table 3x10 from mat table 3x10 from mat table 3 x 8-10 from standard chair, hands on thighs 2 x 10 from standard chair 3 x 10 from weight bench w/o hands   Standing exercises to promote good posture  Scapula retraction and shoulder ext 3x10 blue band in staggered stance   Scapular retractions   3 x 10 black, feet staggered Fwd weight shifts  2 x 15    Tandem stance  3 x 20\" B                                    HEP: Continue HEP. [facility] Portal    Treatment/Session Summary:    · Response to Treatment: Continues to have R LE fatigue. Very tender to R piriformis. Cued patient to walk more upright in order to improve hip extension and gluteal activation to reduce R piriformis symptoms. · Communication/Consultation:  None today  · Equipment provided today:  None today  · Recommendations/Intent for next treatment session: Next visit will focus on improving B LE strength and reducing discomfort. May dry needle R piriformis to reduce tightness.     Total Treatment Billable Duration: 40 mins  PT Patient Time In/Time Out  Time In: 1120  Time Out: [address], PT    Future Appointments   Date Time Provider Cheyenne Martell   4/13/2022  9:45 AM Paula Collazo MD [facility]   4/13/2022 11:15 AM Teddy Martinez, PT [facility]   4/22/2022 10:40 AM Corey Farrell MD [facility]   9/20/2022 11:00 AM Corey Farrell MD [facility]

## 2022-04-13 ENCOUNTER — HOSPITAL ENCOUNTER (OUTPATIENT)
Dept: PHYSICAL THERAPY | Age: 77
Discharge: HOME | End: 2022-04-13
Attending: ORTHOPAEDIC SURGERY
Payer: MEDICARE

## 2022-04-13 ENCOUNTER — ANESTHESIA EVENT (OUTPATIENT)
Dept: SURGERY | Age: 77
End: 2022-04-13
Payer: MEDICARE

## 2022-04-13 PROCEDURE — 97140 MANUAL THERAPY 1/> REGIONS: CPT

## 2022-04-13 PROCEDURE — 97110 THERAPEUTIC EXERCISES: CPT

## 2022-04-13 NOTE — PROGRESS NOTES
Izabel Oh  : 1945  [place] / Product Type: Medicare /  [address]  at [address] [place] [address] Rd  [address] [facility], [address], [place] [address].  [phone]   [phone]       OUTPATIENT PHYSICAL THERAPY: Daily Treatment Note 2022  Visit Count: 8     ICD-10: Treatment Diagnosis: Difficulty in walking, not elsewhere classified (R26.2); Low back pain, unspecified (M54.5)  Precautions/Allergies:   Patient has no known allergies. TREATMENT PLAN:  Effective Dates: 3/23/2022 TO 2022  Frequency/Duration: 1-2 visits per week for 8 weeks MEDICAL/REFERRING DIAGNOSIS:  Encounter for follow-up examination after completed treatment for conditions other than malignant neoplasm [Z09]   DATE OF ONSET: Surgery: 2/10/2022  REFERRING PHYSICIAN: Ridge Riggins MD MD Orders: Evaluate and treat. Include modalities of soft tissue mobilization and dry needling. Return MD Appointment: TBD       Pre-treatment Symptoms/Complaints; Saw MD earlier today. Feels that he is doing better today. R leg feels stronger and is not really experiencing much pain in it today. Pain: Initial:   Not rated Post Session:  Not rated   Medications Last Reviewed:  3/28/22    Updated Objective Findings:   None today     TREATMENT:     Manual Therapy (10 minutes): Soft tissue mobilizations to reduce R piriformis tightness. Therapeutic Exercise: ( 30 minutes):  Exercises per grid below to improve mobility and strength. Required moderate visual and tactile cues to promote proper body mechanics. Progressed repetitions as indicated.       Date:  3/25/22 Date:  3/28/22 Date:  3/30/22 Date  22 Date  22 Date  22 Date  22   Activity/Exercise Parameters Parameters Parameters       Supine pelvic tilts 5 sec hold x 10 5 sec hold x 15 5 sec hold x15 with LE on stability ball X 20, 5 sec hold in hook lyingg X 20, 5 sec hold in hook lying    X 20 with hip adductor ball squeezes X 20, 5 sec   X 20    Hook lying marches 2 x 10 ea LE    + SLR R 3 x 8-10 0#   Low glute bridge 3x10 with glute squeeze 3x10 with ball between the knees 3x10 with LEs on stability ball 3 x 10 with LEs on stability ball 3 x 10 with LEs 3 x 10 B LEs 3 x 10 B LEs   Supine B SLR 2x10 2x15 B 2x10 B -      Side lying hip abduction 2x10 B 2x10 B 2x10 B 3 x 10 B 3 x 10 B 3 x 10 B 3 x 10 R   B clamshells in side lying 3x10 2x10 B 3x10 B       Supine nerve flossing to sciatic nerve 2x10 See above 2x10 B LE R LE  See above    Standing gastroc stretch 20 sec hold x 4 B at incline board 30 sec hold x 2 B  1 minute      Log rolling (bed mobility) Reviewed log rolling with pt and he demonstrated twice. To protect lumbar spine and decrease pain Verbally reviewed        Heel raises   2x10 standing 2 x 10 standing 2 x 10 standing     Step ups   x15 B LE onto 6 in box, no UE support 6 inch  3 x 6-8 w/o support 6\" x 20 B 6\" x 20 R 6\" 3 x 10 B   Sit to stand 3x10 from mat table 3x10 from mat table 3x10 from mat table 3 x 8-10 from standard chair, hands on thighs 2 x 10 from standard chair 3 x 10 from weight bench w/o hands    Standing exercises to promote good posture  Scapula retraction and shoulder ext 3x10 blue band in staggered stance   Scapular retractions   3 x 10 black, feet staggered Fwd weight shifts  2 x 15    Tandem stance  3 x 20\" B      Shuttle press       Bilateral  75# 1 x 12  87.5# 2 x 12     Unilateral  50# 3 x 10-12 R                           HEP: Provided patient with handout for side-lying hip abductions. [employer] Portal    Treatment/Session Summary:    · Response to Treatment: Did not appear as tender to R piriformis today. Improved performance on strength exercises today with R LE.  · Communication/Consultation:  None today  · Equipment provided today:  None today  · Recommendations/Intent for next treatment session: Next visit will focus on improving B LE strength and reducing discomfort.  May dry needle R piriformis to reduce tightness.     Total Treatment Billable Duration: 40 mins  PT Patient Time In/Time Out  Time In: 1115  Time Out: [address] [facility],[address], PT    Future Appointments   Date Time Provider Cheyenne Martell   4/22/2022 10:40 AM Kimberly Pineda MD [facility]   9/20/2022 11:00 AM Kimberly Pineda MD [facility]

## 2022-04-14 ENCOUNTER — ANESTHESIA (OUTPATIENT)
Dept: SURGERY | Age: 77
End: 2022-04-14
Payer: MEDICARE

## 2022-04-14 ENCOUNTER — HOSPITAL ENCOUNTER (OUTPATIENT)
Age: 77
Setting detail: OUTPATIENT SURGERY
Discharge: HOME | End: 2022-04-14
Attending: UROLOGY | Admitting: UROLOGY
Payer: MEDICARE

## 2022-04-14 VITALS
HEART RATE: 61 BPM | DIASTOLIC BLOOD PRESSURE: 79 MMHG | SYSTOLIC BLOOD PRESSURE: 149 MMHG | WEIGHT: 202 LBS | HEIGHT: 74 IN | TEMPERATURE: 98 F | RESPIRATION RATE: 18 BRPM | BODY MASS INDEX: 25.93 KG/M2 | OXYGEN SATURATION: 98 %

## 2022-04-14 DIAGNOSIS — R33.8 BENIGN PROSTATIC HYPERPLASIA WITH URINARY RETENTION: ICD-10-CM

## 2022-04-14 DIAGNOSIS — N40.1 BENIGN PROSTATIC HYPERPLASIA WITH URINARY RETENTION: ICD-10-CM

## 2022-04-14 LAB
APPEARANCE UR: ABNORMAL
BACTERIA URNS QL MICRO: 0 /HPF
BILIRUB UR QL: NEGATIVE
COLOR UR: YELLOW
GLUCOSE BLD STRIP.AUTO-MCNC: 107 MG/DL (ref 65–100)
GLUCOSE UR STRIP.AUTO-MCNC: NEGATIVE MG/DL
HGB UR QL STRIP: ABNORMAL
KETONES UR QL STRIP.AUTO: NEGATIVE MG/DL
LEUKOCYTE ESTERASE UR QL STRIP.AUTO: ABNORMAL
MUCOUS THREADS URNS QL MICRO: ABNORMAL /LPF
NITRITE UR QL STRIP.AUTO: NEGATIVE
PH UR STRIP: 6 [PH] (ref 5–9)
POTASSIUM BLD-SCNC: 3.9 MMOL/L (ref 3.5–5.1)
PROT UR STRIP-MCNC: ABNORMAL MG/DL
RBC #/AREA URNS HPF: ABNORMAL /HPF
SERVICE CMNT-IMP: ABNORMAL
SP GR UR REFRACTOMETRY: 1.02 (ref 1–1.02)
UROBILINOGEN UR QL STRIP.AUTO: 0.2 EU/DL (ref 0.2–1)
WBC URNS QL MICRO: >100 /HPF

## 2022-04-14 PROCEDURE — 77030010509 HC AIRWY LMA MSK TELE -A: Performed by: ANESTHESIOLOGY

## 2022-04-14 PROCEDURE — 52441 CYSTO INSJ TRNSPRSTC 1 IMPLT: CPT | Performed by: UROLOGY

## 2022-04-14 PROCEDURE — 74011250636 HC RX REV CODE- 250/636: Performed by: NURSE ANESTHETIST, CERTIFIED REGISTERED

## 2022-04-14 PROCEDURE — 74011250636 HC RX REV CODE- 250/636: Performed by: ANESTHESIOLOGY

## 2022-04-14 PROCEDURE — 76210000020 HC REC RM PH II FIRST 0.5 HR: Performed by: UROLOGY

## 2022-04-14 PROCEDURE — 2709999900 HC NON-CHARGEABLE SUPPLY: Performed by: UROLOGY

## 2022-04-14 PROCEDURE — 81001 URINALYSIS AUTO W/SCOPE: CPT

## 2022-04-14 PROCEDURE — 77030040922 HC BLNKT HYPOTHRM STRY -A: Performed by: ANESTHESIOLOGY

## 2022-04-14 PROCEDURE — 76010000138 HC OR TIME 0.5 TO 1 HR: Performed by: UROLOGY

## 2022-04-14 PROCEDURE — 76060000032 HC ANESTHESIA 0.5 TO 1 HR: Performed by: UROLOGY

## 2022-04-14 PROCEDURE — L8699 PROSTHETIC IMPLANT NOS: HCPCS | Performed by: UROLOGY

## 2022-04-14 PROCEDURE — 82962 GLUCOSE BLOOD TEST: CPT

## 2022-04-14 PROCEDURE — 84132 ASSAY OF SERUM POTASSIUM: CPT

## 2022-04-14 PROCEDURE — 77030019908 HC STETH ESOPH SIMS -A: Performed by: ANESTHESIOLOGY

## 2022-04-14 PROCEDURE — 74011250637 HC RX REV CODE- 250/637: Performed by: ANESTHESIOLOGY

## 2022-04-14 PROCEDURE — 52442 CYSTO INS TRNSPRSTC IMPLT EA: CPT | Performed by: UROLOGY

## 2022-04-14 PROCEDURE — 76210000063 HC OR PH I REC FIRST 0.5 HR: Performed by: UROLOGY

## 2022-04-14 PROCEDURE — 74011250636 HC RX REV CODE- 250/636: Performed by: UROLOGY

## 2022-04-14 PROCEDURE — 74011000250 HC RX REV CODE- 250: Performed by: NURSE ANESTHETIST, CERTIFIED REGISTERED

## 2022-04-14 DEVICE — SYSTEM URO W/ IMPL DEL DEV FOR TREAT OF URIN OUTFLO: Type: IMPLANTABLE DEVICE | Site: PROSTATE | Status: FUNCTIONAL

## 2022-04-14 RX ORDER — DEXAMETHASONE SODIUM PHOSPHATE 4 MG/ML
INJECTION, SOLUTION INTRA-ARTICULAR; INTRALESIONAL; INTRAMUSCULAR; INTRAVENOUS; SOFT TISSUE AS NEEDED
Status: DISCONTINUED | OUTPATIENT
Start: 2022-04-14 | End: 2022-04-14 | Stop reason: HOSPADM

## 2022-04-14 RX ORDER — ACETAMINOPHEN 500 MG
1000 TABLET ORAL ONCE
Status: COMPLETED | OUTPATIENT
Start: 2022-04-14 | End: 2022-04-14

## 2022-04-14 RX ORDER — CEPHALEXIN 500 MG/1
500 CAPSULE ORAL 2 TIMES DAILY
Qty: 10 CAPSULE | Refills: 0 | Status: SHIPPED | OUTPATIENT
Start: 2022-04-14 | End: 2022-04-19

## 2022-04-14 RX ORDER — HYDROMORPHONE HYDROCHLORIDE 2 MG/ML
0.5 INJECTION, SOLUTION INTRAMUSCULAR; INTRAVENOUS; SUBCUTANEOUS
Status: DISCONTINUED | OUTPATIENT
Start: 2022-04-14 | End: 2022-04-15 | Stop reason: HOSPADM

## 2022-04-14 RX ORDER — HYDROCODONE BITARTRATE AND ACETAMINOPHEN 5; 325 MG/1; MG/1
1 TABLET ORAL
Qty: 20 TABLET | Refills: 0 | Status: SHIPPED | OUTPATIENT
Start: 2022-04-14 | End: 2022-04-21

## 2022-04-14 RX ORDER — MIDAZOLAM HYDROCHLORIDE 1 MG/ML
2 INJECTION, SOLUTION INTRAMUSCULAR; INTRAVENOUS ONCE
Status: DISCONTINUED | OUTPATIENT
Start: 2022-04-14 | End: 2022-04-14 | Stop reason: HOSPADM

## 2022-04-14 RX ORDER — PROPOFOL 10 MG/ML
INJECTION, EMULSION INTRAVENOUS AS NEEDED
Status: DISCONTINUED | OUTPATIENT
Start: 2022-04-14 | End: 2022-04-14 | Stop reason: HOSPADM

## 2022-04-14 RX ORDER — EPHEDRINE SULFATE/0.9% NACL/PF 50 MG/5 ML
SYRINGE (ML) INTRAVENOUS AS NEEDED
Status: DISCONTINUED | OUTPATIENT
Start: 2022-04-14 | End: 2022-04-14 | Stop reason: HOSPADM

## 2022-04-14 RX ORDER — SODIUM CHLORIDE, SODIUM LACTATE, POTASSIUM CHLORIDE, CALCIUM CHLORIDE 600; 310; 30; 20 MG/100ML; MG/100ML; MG/100ML; MG/100ML
100 INJECTION, SOLUTION INTRAVENOUS CONTINUOUS
Status: DISCONTINUED | OUTPATIENT
Start: 2022-04-14 | End: 2022-04-14 | Stop reason: HOSPADM

## 2022-04-14 RX ORDER — OXYCODONE HYDROCHLORIDE 5 MG/1
5 TABLET ORAL
Status: COMPLETED | OUTPATIENT
Start: 2022-04-14 | End: 2022-04-14

## 2022-04-14 RX ORDER — FENTANYL CITRATE 50 UG/ML
100 INJECTION, SOLUTION INTRAMUSCULAR; INTRAVENOUS AS NEEDED
Status: DISCONTINUED | OUTPATIENT
Start: 2022-04-14 | End: 2022-04-14 | Stop reason: HOSPADM

## 2022-04-14 RX ORDER — DIPHENHYDRAMINE HYDROCHLORIDE 50 MG/ML
12.5 INJECTION, SOLUTION INTRAMUSCULAR; INTRAVENOUS
Status: DISCONTINUED | OUTPATIENT
Start: 2022-04-14 | End: 2022-04-15 | Stop reason: HOSPADM

## 2022-04-14 RX ORDER — FLUMAZENIL 0.1 MG/ML
0.2 INJECTION INTRAVENOUS
Status: DISCONTINUED | OUTPATIENT
Start: 2022-04-14 | End: 2022-04-15 | Stop reason: HOSPADM

## 2022-04-14 RX ORDER — CEFAZOLIN SODIUM/WATER 2 G/20 ML
2 SYRINGE (ML) INTRAVENOUS
Status: COMPLETED | OUTPATIENT
Start: 2022-04-14 | End: 2022-04-14

## 2022-04-14 RX ORDER — HYOSCYAMINE SULFATE 0.12 MG/1
0.12 TABLET SUBLINGUAL
Qty: 30 TABLET | Refills: 1 | Status: SHIPPED | OUTPATIENT
Start: 2022-04-14

## 2022-04-14 RX ORDER — PROCHLORPERAZINE EDISYLATE 5 MG/ML
5 INJECTION INTRAMUSCULAR; INTRAVENOUS
Status: DISCONTINUED | OUTPATIENT
Start: 2022-04-14 | End: 2022-04-15 | Stop reason: HOSPADM

## 2022-04-14 RX ORDER — LIDOCAINE HYDROCHLORIDE 20 MG/ML
INJECTION, SOLUTION EPIDURAL; INFILTRATION; INTRACAUDAL; PERINEURAL AS NEEDED
Status: DISCONTINUED | OUTPATIENT
Start: 2022-04-14 | End: 2022-04-14 | Stop reason: HOSPADM

## 2022-04-14 RX ORDER — SODIUM CHLORIDE, SODIUM LACTATE, POTASSIUM CHLORIDE, CALCIUM CHLORIDE 600; 310; 30; 20 MG/100ML; MG/100ML; MG/100ML; MG/100ML
75 INJECTION, SOLUTION INTRAVENOUS CONTINUOUS
Status: DISCONTINUED | OUTPATIENT
Start: 2022-04-14 | End: 2022-04-15 | Stop reason: HOSPADM

## 2022-04-14 RX ORDER — NALOXONE HYDROCHLORIDE 0.4 MG/ML
0.1 INJECTION, SOLUTION INTRAMUSCULAR; INTRAVENOUS; SUBCUTANEOUS AS NEEDED
Status: DISCONTINUED | OUTPATIENT
Start: 2022-04-14 | End: 2022-04-15 | Stop reason: HOSPADM

## 2022-04-14 RX ORDER — LIDOCAINE HYDROCHLORIDE 10 MG/ML
0.1 INJECTION INFILTRATION; PERINEURAL AS NEEDED
Status: DISCONTINUED | OUTPATIENT
Start: 2022-04-14 | End: 2022-04-14 | Stop reason: HOSPADM

## 2022-04-14 RX ORDER — ONDANSETRON 2 MG/ML
INJECTION INTRAMUSCULAR; INTRAVENOUS AS NEEDED
Status: DISCONTINUED | OUTPATIENT
Start: 2022-04-14 | End: 2022-04-14 | Stop reason: HOSPADM

## 2022-04-14 RX ADMIN — SODIUM CHLORIDE, POTASSIUM CHLORIDE, SODIUM LACTATE AND CALCIUM CHLORIDE 100 ML/HR: 600; 310; 30; 20 INJECTION, SOLUTION INTRAVENOUS at 08:31

## 2022-04-14 RX ADMIN — PROPOFOL 100 MG: 10 INJECTION, EMULSION INTRAVENOUS at 08:57

## 2022-04-14 RX ADMIN — LIDOCAINE HYDROCHLORIDE 100 MG: 20 INJECTION, SOLUTION EPIDURAL; INFILTRATION; INTRACAUDAL; PERINEURAL at 08:56

## 2022-04-14 RX ADMIN — ONDANSETRON 4 MG: 2 INJECTION INTRAMUSCULAR; INTRAVENOUS at 09:26

## 2022-04-14 RX ADMIN — DEXAMETHASONE SODIUM PHOSPHATE 4 MG: 4 INJECTION, SOLUTION INTRAMUSCULAR; INTRAVENOUS at 09:26

## 2022-04-14 RX ADMIN — PROPOFOL 200 MG: 10 INJECTION, EMULSION INTRAVENOUS at 08:56

## 2022-04-14 RX ADMIN — ACETAMINOPHEN 1000 MG: 500 TABLET ORAL at 08:31

## 2022-04-14 RX ADMIN — OXYCODONE 5 MG: 5 TABLET ORAL at 09:51

## 2022-04-14 RX ADMIN — Medication 10 MG: at 09:26

## 2022-04-14 RX ADMIN — Medication 2 G: at 09:00

## 2022-04-14 NOTE — OP NOTES
[address] [facility] OPERATIVE REPORT     Name:  Shital Danielson  MR#:  [id number]  :   1945    DATE OF SERVICE:  2022     PREOPERATIVE DIAGNOSIS:  Benign prostatic hyperplasia/lower urinary tract symptoms. POSTOPERATIVE DIAGNOSIS:  Benign prostatic hyperplasia/lower urinary tract symptoms. PROCEDURE PERFORMED:  UroLift. SURGEON:  Fermín Tesfaye MD     ASSISTANT: None     ANESTHESIA:  General.     COMPLICATIONS:  None. SPECIMENS REMOVED:  None. FINDINGS:  Lateral lobe hyperplasia bilaterally, No significant median lobe, Deployment of 4 UroLift implants with an open anterior channel at the end of the case. IMPLANTS:  UroLift     ESTIMATED BLOOD LOSS:  1 mL. INDICATIONS FOR OPERATIVE PROCEDURE:  The patient is a 68year old gentleman with symptomatic BPH/LUTS refractory to medications who opted for UroLift procedure today. Preoperative cystoscopy revealed no significant median lobe, but did show lateral lobe hyperplasia. He was counseled extensively on management options and risks/benefits and opted to proceed today with Urolift. DESCRIPTION OF OPERATIVE PROCEDURE:  After informed consent was obtained, the correct patient was identified in preoperative holding area, he was taken back to operating suite and placed on the table in the supine position. Time-out was performed confirming the correct patient and planned procedure. He received 2 g of IV Ancef prior to smooth induction of general endotracheal anesthesia. He was then moved to the dorsal lithotomy position, prepped and draped in usual sterile fashion. I began the case by inserting a 20-Japanese rigid urolift cystoscope with the visual obturator into the urethra and advanced it into the patient's bladder. .  Pancystoscopy was performed which was unremarkable. There was no significant median lobe and but significant lateral lobe bilateral hypertrophy.   I then removed the cystoscope back to the verumontanum and examined the prostate very carefully. At this point, I then placed the cystoscope back into the patient's bladder. I removed the obturator and loaded the first UroLift implant onto the camera. I then turned to the right anterolateral position, about 10 o'clock on the clock face, backed the implant system into the patient's prostatic urethra about 2 cm distal to the bladder neck. I then compressed the prostate tissue laterally and then fired the implant into the prostate and released it. The implant appeared in good position. I then came back to the midline and reinserted my scope into the patient's bladder. I removed the implant device and loaded a second implant. I then turned my attention to the 2 o'clock position on the clock face on the left side of the patient and performed gentle compression of the prostate again about 2 cm distal to the bladder neck, just across from my first implant. I injected the second implant in this location which helped to create an open anterior channel. I then again moved back into the bladder before removing the implant device. I then proceeded to deploy two more implants at the verumontanum distally to create an open anterior channel in the patient's prostate gland. These implants were delivered in the exact same fashion as the initial two. I then switched back to the visual obturator after the delivery of four implants and investigated the prostatic urethra.  0 additional implants were placed beyond the typical four to allow for an open channel. After placement of all of the implants, final cystoscopy using the visual obturator showed a nice wide open anterior channel at the end of the case. I then drained the bladder completely. I inspected the prostatic urethra. There was no evidence of any significant bleeding, no evidence of any injury to the bladder neck or bladder and all of the implants appear to be in a satisfactory location.   At this point, I then removed the rigid cystoscope and did leave a Briseno catheter due to his history of urinary retention. The patient was then awoken from anesthesia and transferred to the PACU in stable condition. He tolerated the procedure well. There were no complications. All counts were correct at the end of the procedure. He will follow up in the office next week for catheter removal.    Carole Caro. Casey Mcgraw M.D.     [facility]  [address]  Phone: [phone]  Fax: [phone]

## 2022-04-14 NOTE — ANESTHESIA PREPROCEDURE EVALUATION
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 Cardiovascular    Hypertension: well controlled          Hyperlipidemia    Exercise tolerance: >4 METS  Comments: Echo 2020 - normal LV function, mild TR   GI/Hepatic/Renal     GERD: well controlled           Endo/Other      Hypothyroidism: well controlled       Other Findings              Physical Exam    Airway  Mallampati: III  TM Distance: > 6 cm  Neck ROM: normal range of motion   Mouth opening: Diminished (comment)     Cardiovascular    Rhythm: regular  Rate: normal    Murmur: Grade 2, Aortic area     Dental    Dentition: Caps/crowns     Pulmonary  Breath sounds clear to auscultation               Abdominal  GI exam deferred       Other Findings            Anesthetic Plan    ASA: 2  Anesthesia type: general  Plan for LMA        Induction: Intravenous  Anesthetic plan and risks discussed with: Patient

## 2022-04-14 NOTE — PERIOP NOTES
Discharge instructions given to patient's wife, Víctor Murillo, at bedside. Verbalized understanding. No questions at this time. Briseno instructions given.

## 2022-04-14 NOTE — DISCHARGE INSTRUCTIONS
Your Recovery  Your urethra may be sore at first, and it may burn when you urinate for the first few days after the procedure. You may feel the need to urinate more often, and your urine may be pink. These symptoms should get better in a few days. You will probably be able to go back to work or most of your usual activities in a couple days. How can you care for yourself at home? Activity  · Rest when you feel tired. Getting enough sleep will help you recover. · Try to walk each day. Start by walking a little more than you did the day before. Bit by bit, increase the amount you walk. Walking boosts blood flow and helps prevent pneumonia and constipation. · Avoid strenuous activities, such as bicycle riding, jogging, weight lifting, or aerobic exercise, until your doctor says it is okay. · Ask your doctor when you can drive again. · Most people are able to return to work within 1 or 2 days after the procedure. · You may shower as usual.  · Ask your doctor when it is okay for you to have sex. Diet  · You can eat your normal diet. If your stomach is upset, try bland, low-fat foods like plain rice, broiled chicken, toast, and yogurt. · Drink plenty of fluids (unless your doctor tells you not to). Medicines  · If you take a narcotic pain medication, take a stool softender. Follow-up care is a key part of your treatment and safety. Be sure to make and go to all appointments, and call your doctor if you are having problems. It's also a good idea to know your test results and keep a list of the medicines you take. When should you call for help? Call 911 anytime you think you may need emergency care. For example, call if:  · You passed out (lost consciousness). · You have severe trouble breathing. · You have sudden chest pain and shortness of breath, or you cough up blood. · You have severe belly pain.   Call your doctor now or seek immediate medical care if:  · You are sick to your stomach or cannot keep fluids down. · Your urine is still red or you see blood clots after you have urinated several times. · You have trouble passing urine. · You have signs of a blood clot, such as:  ¨ Pain in your calf, back of the knee, thigh, or groin. ¨ Redness and swelling in your leg or groin. · You develop a fever or severe chills. Watch closely for changes in your health, and be sure to contact your doctor if:  · A burning feeling is normal for a day or two after the test, but call if it does not get better. · You have a frequent urge to urinate but can pass only small amounts of urine. · It is normal for the urine to have a pinkish color for a few days after the test, but call if it does not get better or if your urine is cloudy, smells bad, or has pus in it. After general anesthesia or intravenous sedation, for 24 hours or while taking prescription Narcotics:  · Limit your activities  · A responsible adult needs to be with you for the next 24 hours  · Do not drive and operate hazardous machinery  · Do not make important personal or business decisions  · Do not drink alcoholic beverages  · If you have not urinated within 8 hours after discharge, and you are experiencing discomfort from urinary retention, please go to the nearest ED. · If you have sleep apnea and have a CPAP machine, please use it for all naps and sleeping. · Please use caution when taking narcotics and any of your home medications that may cause drowsiness. *  Please give a list of your current medications to your Primary Care Provider. *  Please update this list whenever your medications are discontinued, doses are      changed, or new medications (including over-the-counter products) are added. *  Please carry medication information at all times in case of emergency situations.     These are general instructions for a healthy lifestyle:  No smoking/ No tobacco products/ Avoid exposure to second hand smoke  Surgeon General's Warning:  Quitting smoking now greatly reduces serious risk to your health. Obesity, smoking, and sedentary lifestyle greatly increases your risk for illness  A healthy diet, regular physical exercise & weight monitoring are important for maintaining a healthy lifestyle    You may be retaining fluid if you have a history of heart failure or if you experience any of the following symptoms:  Weight gain of 3 pounds or more overnight or 5 pounds in a week, increased swelling in our hands or feet or shortness of breath while lying flat in bed. Please call your doctor as soon as you notice any of these symptoms; do not wait until your next office visit. An indwelling Kidd Catheter is a tube that empties urine from your bladder into a drainage bag. To prevent blockage of the catheter and contamination of the urine, it is important that you follow a few guidelines in caring for your catheter:    1. Empty your drainage bag once every 8 hours or as soon as it fills. 2.  Empty the bag by loosening the clamp on the end of the bag (leg or bedside bag). Do not touch the tip of the tube. After draining the urine into the toilet, you may clean the tip with a povidone-iodine (Betadine) solution. Wash hands well before and after emptying drainage bag.    3.  Always keep the drainage bag lower than the catheter. Remember that urine will not drain uphill or against gravity. Check the tubing for kinks, since urine will not drain past a kink. 4. Flush your bladder by drinking plenty of liquids. Clear liquids and water are ideal; remember to drink at least 8 glasses of water each day. 5.  It is important to clean around your meatus every day and after having a bowel movement (mason. Female)  while you have an indwelling kidd catheter. Using a soapy wash cloth, wash area thoroughly and rinse, using a forward to backward motion being careful not to introduce bacteria  around catheter.     6.  If you are to keep your catheter for an extended period of time, you may be given a leg bag that attaches to your thigh or calf with Velcro straps. If you are sent home with more than one bag, you will be given instructions on how to care for the bags and change them. 7.  If you are to remove your catheter at home, you will be sent home with specific instructions on how to do that.

## 2022-04-15 PROBLEM — Z09 STATUS POST ORTHOPEDIC SURGERY, FOLLOW-UP EXAM: Status: ACTIVE | Noted: 2022-04-15

## 2022-04-18 ENCOUNTER — APPOINTMENT (OUTPATIENT)
Dept: PHYSICAL THERAPY | Age: 77
End: 2022-04-18
Attending: ORTHOPAEDIC SURGERY
Payer: MEDICARE

## 2022-04-20 ENCOUNTER — APPOINTMENT (OUTPATIENT)
Dept: PHYSICAL THERAPY | Age: 77
End: 2022-04-20
Attending: ORTHOPAEDIC SURGERY
Payer: MEDICARE

## 2022-04-26 ENCOUNTER — HOSPITAL ENCOUNTER (OUTPATIENT)
Dept: PHYSICAL THERAPY | Age: 77
End: 2022-04-26
Attending: ORTHOPAEDIC SURGERY
Payer: MEDICARE

## 2022-04-27 ENCOUNTER — APPOINTMENT (OUTPATIENT)
Dept: PHYSICAL THERAPY | Age: 77
End: 2022-04-27
Attending: ORTHOPAEDIC SURGERY
Payer: MEDICARE

## 2022-05-02 ENCOUNTER — HOSPITAL ENCOUNTER (OUTPATIENT)
Dept: PHYSICAL THERAPY | Age: 77
Discharge: HOME | End: 2022-05-02
Attending: ORTHOPAEDIC SURGERY
Payer: MEDICARE

## 2022-05-02 PROCEDURE — 97140 MANUAL THERAPY 1/> REGIONS: CPT

## 2022-05-02 PROCEDURE — 97110 THERAPEUTIC EXERCISES: CPT

## 2022-05-02 NOTE — PROGRESS NOTES
Emma Cardenas  : 1945  [place] / Product Type: Medicare /  [address]  at [address] [place] [address] Rd  [address] [facility], [address], [address] [facility]  [phone]   [phone]       OUTPATIENT PHYSICAL THERAPY: Daily Treatment Note 2022  Visit Count: 9     ICD-10: Treatment Diagnosis: Difficulty in walking, not elsewhere classified (R26.2); Low back pain, unspecified (M54.5)  Precautions/Allergies:   Patient has no known allergies. TREATMENT PLAN:  Effective Dates: 3/23/2022 TO 2022  Frequency/Duration: 1-2 visits per week for 8 weeks MEDICAL/REFERRING DIAGNOSIS:  Encounter for follow-up examination after completed treatment for conditions other than malignant neoplasm [Z09]   DATE OF ONSET: Surgery: 2/10/2022  REFERRING PHYSICIAN: Zeke Friend MD MD Orders: Evaluate and treat. Include modalities of soft tissue mobilization and dry needling. Return MD Appointment: TBD       Pre-treatment Symptoms/Complaints; Did more yardwork \"than I probably should have\" this weekend. R leg continues to be weak after 5-10 minutes and has pain in R leg. He underwent a procedure unrelated to this episode which halted his therapy. Pain: Initial:   3/10 Post Session:  Not rated   Medications Last Reviewed:  2022    Updated Objective Findings:   None today     TREATMENT:   Manual Therapy (10 minutes): Soft tissue mobilizations to reduce tenderness and tightness to R piriformis. Therapeutic Exercise: ( 30 minutes):  Exercises per grid below to improve mobility and strength. Required moderate visual and tactile cues to promote proper body mechanics. Progressed repetitions as indicated. Hamstring stretches (3 x 20\") to R LE.       Date:  3/25/22 Date:  3/28/22 Date:  3/30/22 Date  22 Date  22 Date  22 Date  22 Date  22   Activity/Exercise Parameters Parameters Parameters        Supine pelvic tilts 5 sec hold x 10 5 sec hold x 15 5 sec hold x15 with LE on stability ball X 20, 5 sec hold in hook lyingg X 20, 5 sec hold in hook lying    X 20 with hip adductor ball squeezes X 20, 5 sec   X 20    Hook lying marches 2 x 10 ea LE    + SLR R 3 x 8-10 0# X 30    Hook lying marches 2 x 10 B   Low glute bridge 3x10 with glute squeeze 3x10 with ball between the knees 3x10 with LEs on stability ball 3 x 10 with LEs on stability ball 3 x 10 with LEs 3 x 10 B LEs 3 x 10 B LEs 3 x 10 B LEs on swiss ball (red)   Supine B SLR 2x10 2x15 B 2x10 B -       Side lying hip abduction 2x10 B 2x10 B 2x10 B 3 x 10 B 3 x 10 B 3 x 10 B 3 x 10 R 3 x 10 R   B clamshells in side lying 3x10 2x10 B 3x10 B        Supine nerve flossing to sciatic nerve 2x10 See above 2x10 B LE R LE  See above     Standing gastroc stretch 20 sec hold x 4 B at incline board 30 sec hold x 2 B  1 minute       Log rolling (bed mobility) Reviewed log rolling with pt and he demonstrated twice. To protect lumbar spine and decrease pain Verbally reviewed         Heel raises   2x10 standing 2 x 10 standing 2 x 10 standing      Step ups   x15 B LE onto 6 in box, no UE support 6 inch  3 x 6-8 w/o support 6\" x 20 B 6\" x 20 R 6\" 3 x 10 B 6\" 3 x 10 B   Sit to stand 3x10 from mat table 3x10 from mat table 3x10 from mat table 3 x 8-10 from standard chair, hands on thighs 2 x 10 from standard chair 3 x 10 from weight bench w/o hands  3 x 10 from   Standing exercises to promote good posture  Scapula retraction and shoulder ext 3x10 blue band in staggered stance   Scapular retractions   3 x 10 black, feet staggered Fwd weight shifts  2 x 15    Tandem stance  3 x 20\" B       Shuttle press       Bilateral  75# 1 x 12  87.5# 2 x 12     Unilateral  50# 3 x 10-12 R Bilateral  87.5# 3 x 10                                HEP: No changes to HEP. [facility]    Treatment/Session Summary:    · Response to Treatment: Patient did well with exercises today.  R LE endurance appears improved, and she was able to do step ups with good performance. Remains tender to R piriformis. · Communication/Consultation:  None today  · Equipment provided today:  None today  · Recommendations/Intent for next treatment session: Next visit will focus on improving B LE strength and reducing discomfort. May dry needle R piriformis to reduce tightness.     Total Treatment Billable Duration: 40 minutes  PT Patient Time In/Time Out  Time In: 1030  Time Out: [address] Kenny Espinal, PT    Future Appointments   Date Time Provider Cheyenne Aguiari   5/2/2022 10:30 AM Dylon Oates., PT KENDY [facility]   5/4/2022  9:15 AM Dylon Oates., PT COLTORPTCHITRA [facility]   5/11/2022  2:30 PM William Handley MD [id number] [facility]   5/12/2022 10:40 AM Elvin Vinson MD [facility]   9/20/2022 11:00 AM Elvin Vinson MD [facility]

## 2022-05-04 ENCOUNTER — HOSPITAL ENCOUNTER (OUTPATIENT)
Dept: PHYSICAL THERAPY | Age: 77
Discharge: HOME | End: 2022-05-04
Attending: ORTHOPAEDIC SURGERY
Payer: MEDICARE

## 2022-05-04 PROCEDURE — 97110 THERAPEUTIC EXERCISES: CPT

## 2022-05-04 NOTE — PROGRESS NOTES
Loreto Baires  : 1945  [place]  Secondary: [address] [facility] at [facility] ELAINE CHAVEZ  [address] [place] [address],  Enrique Matta.  [phone]   [phone]         OUTPATIENT PHYSICAL THERAPY:Progress Report 2022   ICD-10: Treatment Diagnosis: Difficulty in walking, not elsewhere classified (R26.2); Low back pain, unspecified (M54.5)  Precautions/Allergies:   Patient has no known allergies. TREATMENT PLAN:  Effective Dates: 3/23/2022 TO 2022. Frequency/Duration: 1-2 visits per week for 8 weeks MEDICAL/REFERRING DIAGNOSIS:  Encounter for follow-up examination after completed treatment for conditions other than malignant neoplasm [Z09]   DATE OF ONSET: Surgery: 2/10/2022  REFERRING PHYSICIAN: Cameron Douglas MD MD Orders: Evaluate and treat. Include modalities of soft tissue mobilization and dry needling. Return MD Appointment: TBD     PROGRESS ASSESSMENT:  Mr. Ban Rojas has attended 9 PT appointments to address back and R leg pain and weakness following a laminectomy at L2-S1, which was further complicated by a hospital stay for sepsis due to a UTI. He has made good progress with R LE endurance and strength, and has improved his ambulation endurance but remains weak to R LE as compared to L LE. He will likely benefit from continued PT to improve his R leg strength, body mechanics with lifting, and improve his gait endurance. PROBLEM LIST (Impacting functional limitations):  1. Decreased Strength  2. Decreased Ambulation Ability/Technique  3. Increased Pain  4. Decreased Activity Tolerance  5. Decreased Flexibility/Joint Mobility  6. Decreased [place] with Home Exercise Program INTERVENTIONS PLANNED: (Treatment may consist of any combination of the following)  1. Home Exercise Program (HEP)  2. Manual Therapy  3. Neuromuscular Re-education/Strengthening  4. Range of Motion (ROM)  5.  Therapeutic Activites  6. Therapeutic Exercise/Strengthening  7. Ultrasound (US)  8. Dry needling     GOALS: (Goals have been discussed and agreed upon with patient.)  Short-Term Functional Goals: Time Frame: 4 weeks  1. Patient will report 50% improvement in distal R LE symptoms. Ongoing, progressing \"30-40%, 5/4/22  2. Patient will be able to walk for 30 minutes until he has to take a rest break. Ongoing, progressing \"15-20 minutes\" 5/4/22  3. Patient will have 0/10 R foot/LE discomfort at rest. Ongoing, progressing 5/4/22 (\"maybe at 2\")  4. Patient will have 4/5 hip extensor strength bilaterally with manual muscle testing. Ongoing, 5/4/22  Discharge Goals: Time Frame: 8 weeks  1. Patient will be able to walk for >2 miles without having to stop due to back or R LE pain. 2. Patient will report centralization of R LE symptoms. 3. Patient will have 5/5 hip extensor strength bilaterally. 4. Patient will be independent with HEP. OUTCOME MEASURE:   Tool Used: Modified Oswestry Low Back Pain Questionnaire  Score:  Initial: 18/50 or 36% limited Most Recent: 14/50 or 28% limited (Date: 5/4/2022 )   Interpretation of Score: Each section is scored on a 0-5 scale, 5 representing the greatest disability. The scores of each section are added together for a total score of 50.       Ivan Rojas, PT  5/4/2022

## 2022-05-04 NOTE — PROGRESS NOTES
Deisy Vera  : 1945  [place] / Product Type: Medicare /  [address] at [facility] ELAINE CHAVEZ  [address] [place] [address], [address], [place] [address].  [phone]   [phone]       OUTPATIENT PHYSICAL THERAPY: Daily Treatment Note 2022  Visit Count: 9     ICD-10: Treatment Diagnosis: Difficulty in walking, not elsewhere classified (R26.2); Low back pain, unspecified (M54.5)  Precautions/Allergies:   Patient has no known allergies. TREATMENT PLAN:  Effective Dates: 3/23/2022 TO 2022  Frequency/Duration: 1-2 visits per week for 8 weeks MEDICAL/REFERRING DIAGNOSIS:  Encounter for follow-up examination after completed treatment for conditions other than malignant neoplasm [Z09]   DATE OF ONSET: Surgery: 2/10/2022  REFERRING PHYSICIAN: Mary Diallo MD MD Orders: Evaluate and treat. Include modalities of soft tissue mobilization and dry needling. Return MD Appointment: TBD       Pre-treatment Symptoms/Complaints; Reports that he is feeling ok today. Leg fatigues quickly but is becoming stronger. Pain: Initial:   Not rated Post Session:  Not rated   Medications Last Reviewed:  2022    Updated Objective Findings:   See progress report     TREATMENT:     Therapeutic Exercise: ( 40 minutes):  Exercises per grid below to improve mobility and strength. Required moderate visual and tactile cues to promote proper body mechanics. Progressed repetitions as indicated. Hamstring stretches (3 x 20\") to R LE, and stretches to R piriformis (3 x 20\") to reduce discomfort.      Date:  3/25/22 Date:  3/28/22 Date:  3/30/22 Date  22 Date  22 Date  22 Date  22 Date  22 Date  22   Activity/Exercise Parameters Parameters Parameters         Supine pelvic tilts 5 sec hold x 10 5 sec hold x 15 5 sec hold x15 with LE on stability ball X 20, 5 sec hold in hook lyingg X 20, 5 sec hold in hook lying    X 20 with hip adductor ball squeezes X 20, 5 sec   X 20    Hook lying marches 2 x 10 ea LE    + SLR R 3 x 8-10 0# X 30    Hook lying marches 2 x 10 B X 20     Hook lying marches 3 x 10 B   Low glute bridge 3x10 with glute squeeze 3x10 with ball between the knees 3x10 with LEs on stability ball 3 x 10 with LEs on stability ball 3 x 10 with LEs 3 x 10 B LEs 3 x 10 B LEs 3 x 10 B LEs on swiss ball (red) 3 x 10 B LEs on swiss ball (yellow)   Supine B SLR 2x10 2x15 B 2x10 B -        Side lying hip abduction 2x10 B 2x10 B 2x10 B 3 x 10 B 3 x 10 B 3 x 10 B 3 x 10 R 3 x 10 R -   B clamshells in side lying 3x10 2x10 B 3x10 B         Supine nerve flossing to sciatic nerve 2x10 See above 2x10 B LE R LE  See above      Standing gastroc stretch 20 sec hold x 4 B at incline board 30 sec hold x 2 B  1 minute        Log rolling (bed mobility) Reviewed log rolling with pt and he demonstrated twice. To protect lumbar spine and decrease pain Verbally reviewed          Heel raises   2x10 standing 2 x 10 standing 2 x 10 standing       Step ups   x15 B LE onto 6 in box, no UE support 6 inch  3 x 6-8 w/o support 6\" x 20 B 6\" x 20 R 6\" 3 x 10 B 6\" 3 x 10 B 6\" 3 x 10 B   Sit to stand 3x10 from mat table 3x10 from mat table 3x10 from mat table 3 x 8-10 from standard chair, hands on thighs 2 x 10 from standard chair 3 x 10 from weight bench w/o hands  3 x 10 from 3 x 10 from standard chair   Standing exercises to promote good posture  Scapula retraction and shoulder ext 3x10 blue band in staggered stance   Scapular retractions   3 x 10 black, feet staggered Fwd weight shifts  2 x 15    Tandem stance  3 x 20\" B        Shuttle press       Bilateral  75# 1 x 12  87.5# 2 x 12     Unilateral  50# 3 x 10-12 R Bilateral  87.5# 3 x 10      Bilateral  87.5# 3 x 10    Unilateral  37.5# 3 x 10 R                             HEP: No changes to HEP. [facility] Portal    Treatment/Session Summary:    · Response to Treatment: Remains tender to R piriformis.  Did well with exercises and is making good progress with R LE strength. · Communication/Consultation:  None today  · Equipment provided today:  None today  · Recommendations/Intent for next treatment session: Next visit will focus on improving B LE strength and reducing discomfort. May dry needle R piriformis to reduce tightness.     Total Treatment Billable Duration: 40 minutes  PT Patient Time In/Time Out  Time In: 0823  Time Out: [address],     Future Appointments   Date Time Provider \[facility]\""   5/11/2022  2:30 PM Fara Francois MD [id number] [facility]   5/12/2022 10:40 AM Trinh Coleman MD [facility]   9/20/2022 11:00 AM Trinh Coleman MD [facility]

## 2022-05-09 ENCOUNTER — HOSPITAL ENCOUNTER (OUTPATIENT)
Dept: PHYSICAL THERAPY | Age: 77
Discharge: HOME | End: 2022-05-09
Attending: ORTHOPAEDIC SURGERY
Payer: MEDICARE

## 2022-05-09 PROCEDURE — 97110 THERAPEUTIC EXERCISES: CPT

## 2022-05-09 NOTE — PROGRESS NOTES
Colette Robles  : 1945  [place] / Product Type: Medicare /  [address]  at [facility] ELAINE CHAVEZ  [address] [place] [address], [address], [place] [address].  [phone]   [phone]       OUTPATIENT PHYSICAL THERAPY: Daily Treatment Note 2022  Visit Count:  9       ICD-10: Treatment Diagnosis: Difficulty in walking, not elsewhere classified (R26.2); Low back pain, unspecified (M54.5)  Precautions/Allergies:   Patient has no known allergies. TREATMENT PLAN:  Effective Dates: 3/23/2022 TO 2022  Frequency/Duration: 1-2 visits per week for 8 weeks MEDICAL/REFERRING DIAGNOSIS:  Encounter for follow-up examination after completed treatment for conditions other than malignant neoplasm [Z09]   DATE OF ONSET: Surgery: 2/10/2022  REFERRING PHYSICIAN: Anila Dozier MD MD Orders: Evaluate and treat. Include modalities of soft tissue mobilization and dry needling. Return MD Appointment: TBD       Pre-treatment Symptoms/Complaints; Pt reports pain in the R LE with walking, standing, and bending over. Pain: Initial:   right hip with movement Post Session:  No pain reported   Medications Last Reviewed:  2022    Updated Objective Findings:   See progress report     TREATMENT:     Therapeutic Exercise: ( 40 minutes):  Exercises per grid below to improve mobility and strength. Required moderate visual and tactile cues to promote proper body mechanics. Progressed repetitions as indicated. Hamstring stretches (3 x 20\") to R LE, and stretches to B piriformis ( 4x 30\") to reduce discomfort.      Date:  3/28/22 Date:  3/30/22 Date  22 Date  22 Date  22 Date  22 Date  22 Date  22 Date  22   Activity/Exercise Parameters Parameters          Supine pelvic tilts 5 sec hold x 15 5 sec hold x15 with LE on stability ball X 20, 5 sec hold in hook lyingg X 20, 5 sec hold in hook lying    X 20 with hip adductor ball squeezes X 20, 5 sec   X 20    Hook lying marches 2 x 10 ea LE    + SLR R 3 x 8-10 0# X 30    Hook lying marches 2 x 10 B X 20     Hook lying marches 3 x 10 B x15    Hook lying marches 3x10   Low glute bridge 3x10 with ball between the knees 3x10 with LEs on stability ball 3 x 10 with LEs on stability ball 3 x 10 with LEs 3 x 10 B LEs 3 x 10 B LEs 3 x 10 B LEs on swiss ball (red) 3 x 10 B LEs on swiss ball (yellow) 3x10 B LE on green stability ball   Supine B SLR 2x15 B 2x10 B -      3x10 B   Side lying hip abduction 2x10 B 2x10 B 3 x 10 B 3 x 10 B 3 x 10 B 3 x 10 R 3 x 10 R -    B clamshells in side lying 2x10 B 3x10 B          Supine nerve flossing to sciatic nerve See above 2x10 B LE R LE  See above    x10   Standing gastroc stretch 30 sec hold x 2 B  1 minute         Log rolling (bed mobility) Verbally reviewed           Heel raises  2x10 standing 2 x 10 standing 2 x 10 standing        Step ups  x15 B LE onto 6 in box, no UE support 6 inch  3 x 6-8 w/o support 6\" x 20 B 6\" x 20 R 6\" 3 x 10 B 6\" 3 x 10 B 6\" 3 x 10 B 8 in x15 B   Sit to stand 3x10 from mat table 3x10 from mat table 3 x 8-10 from standard chair, hands on thighs 2 x 10 from standard chair 3 x 10 from weight bench w/o hands  3 x 10 from 3 x 10 from standard chair 3x10 from standard chair, no UE   Standing exercises to promote good posture Scapula retraction and shoulder ext 3x10 blue band in staggered stance   Scapular retractions   3 x 10 black, feet staggered Fwd weight shifts  2 x 15    Tandem stance  3 x 20\" B         Shuttle press      Bilateral  75# 1 x 12  87.5# 2 x 12     Unilateral  50# 3 x 10-12 R Bilateral  87.5# 3 x 10      Bilateral  87.5# 3 x 10    Unilateral  37.5# 3 x 10 R    Box squat and golfer's tilt         x5 each to review proper lifting techniques                 HEP: No changes to HEP. [facility] Portal    Treatment/Session Summary:    · Response to Treatment: Reviewed correct lifting techniques with the pt to decrease pain with ADLs.  Pt demonstrated good body mechanics with acitivity. · Communication/Consultation:  None today  · Equipment provided today:  None today  · Recommendations/Intent for next treatment session: Next visit will focus on improving B LE strength and reducing discomfort. May dry needle R piriformis to reduce tightness.     Total Treatment Billable Duration: 40 minutes  PT Patient Time In/Time Out  Time In: 1100  Time Out: [address], [facility]    Future Appointments   Date Time Provider Cheyenne Aguiari   5/11/2022  2:30 PM Vernell Fuentes MD [id number] [facility]   5/12/2022 10:40 AM Emmett Mondragon MD [facility]   5/13/2022 10:00 AM Aura Rangel, PT [id number] [facility]

## 2022-05-15 PROBLEM — Z09 STATUS POST ORTHOPEDIC SURGERY, FOLLOW-UP EXAM: Status: RESOLVED | Noted: 2022-04-15 | Resolved: 2022-05-15

## 2022-08-08 ENCOUNTER — TELEPHONE (OUTPATIENT)
Dept: ORTHOPEDIC SURGERY | Age: 77
End: 2022-08-08

## 2022-08-08 DIAGNOSIS — M51.36 DISC DEGENERATION, LUMBAR: Primary | ICD-10-CM

## 2022-08-08 RX ORDER — EZETIMIBE 10 MG/1
TABLET ORAL
Qty: 90 TABLET | Refills: 0 | Status: SHIPPED | OUTPATIENT
Start: 2022-08-08 | End: 2022-09-20 | Stop reason: SDUPTHER

## 2022-08-08 RX ORDER — PREGABALIN 225 MG/1
225 CAPSULE ORAL 2 TIMES DAILY
Qty: 60 CAPSULE | Refills: 2 | Status: SHIPPED | OUTPATIENT
Start: 2022-08-08 | End: 2022-11-06

## 2022-09-01 RX ORDER — POTASSIUM CHLORIDE 20 MEQ/1
TABLET, EXTENDED RELEASE ORAL
Qty: 30 TABLET | Refills: 0 | Status: SHIPPED | OUTPATIENT
Start: 2022-09-01 | End: 2022-09-20 | Stop reason: SDUPTHER

## 2022-09-20 ENCOUNTER — OFFICE VISIT (OUTPATIENT)
Dept: INTERNAL MEDICINE CLINIC | Facility: CLINIC | Age: 77
End: 2022-09-20
Payer: MEDICARE

## 2022-09-20 VITALS
DIASTOLIC BLOOD PRESSURE: 72 MMHG | BODY MASS INDEX: 27.34 KG/M2 | TEMPERATURE: 97.9 F | SYSTOLIC BLOOD PRESSURE: 131 MMHG | HEART RATE: 69 BPM | HEIGHT: 74 IN | WEIGHT: 213 LBS | OXYGEN SATURATION: 96 %

## 2022-09-20 DIAGNOSIS — K21.9 GASTROESOPHAGEAL REFLUX DISEASE WITHOUT ESOPHAGITIS: ICD-10-CM

## 2022-09-20 DIAGNOSIS — Z23 ENCOUNTER FOR IMMUNIZATION: ICD-10-CM

## 2022-09-20 DIAGNOSIS — I25.10 CORONARY ARTERY DISEASE DUE TO CALCIFIED CORONARY LESION: ICD-10-CM

## 2022-09-20 DIAGNOSIS — I10 ESSENTIAL HYPERTENSION: ICD-10-CM

## 2022-09-20 DIAGNOSIS — G89.29 CHRONIC BILATERAL LOW BACK PAIN WITHOUT SCIATICA: ICD-10-CM

## 2022-09-20 DIAGNOSIS — E03.4 HYPOTHYROIDISM DUE TO ACQUIRED ATROPHY OF THYROID: ICD-10-CM

## 2022-09-20 DIAGNOSIS — E87.6 HYPOKALEMIA: ICD-10-CM

## 2022-09-20 DIAGNOSIS — I25.84 CORONARY ARTERY DISEASE DUE TO CALCIFIED CORONARY LESION: ICD-10-CM

## 2022-09-20 DIAGNOSIS — M54.50 CHRONIC BILATERAL LOW BACK PAIN WITHOUT SCIATICA: ICD-10-CM

## 2022-09-20 DIAGNOSIS — Z12.5 PROSTATE CANCER SCREENING: ICD-10-CM

## 2022-09-20 DIAGNOSIS — E78.2 MIXED HYPERLIPIDEMIA: ICD-10-CM

## 2022-09-20 DIAGNOSIS — R73.03 PREDIABETES: ICD-10-CM

## 2022-09-20 DIAGNOSIS — I77.89 ENLARGED THORACIC AORTA (HCC): ICD-10-CM

## 2022-09-20 DIAGNOSIS — Z00.00 MEDICARE ANNUAL WELLNESS VISIT, SUBSEQUENT: Primary | ICD-10-CM

## 2022-09-20 PROBLEM — M79.604 RIGHT LEG PAIN: Status: RESOLVED | Noted: 2018-12-19 | Resolved: 2022-09-20

## 2022-09-20 PROBLEM — R20.2 PARESTHESIA OF RIGHT FOOT: Status: RESOLVED | Noted: 2018-12-19 | Resolved: 2022-09-20

## 2022-09-20 PROBLEM — R01.1 SYSTOLIC MURMUR: Status: RESOLVED | Noted: 2020-09-03 | Resolved: 2022-09-20

## 2022-09-20 PROBLEM — M54.16 LUMBAR RADICULAR PAIN: Status: RESOLVED | Noted: 2019-06-20 | Resolved: 2022-09-20

## 2022-09-20 PROBLEM — R93.1 ELEVATED CORONARY ARTERY CALCIUM SCORE: Status: RESOLVED | Noted: 2020-09-03 | Resolved: 2022-09-20

## 2022-09-20 PROBLEM — G62.9 AXONAL POLYNEUROPATHY: Status: RESOLVED | Noted: 2018-12-19 | Resolved: 2022-09-20

## 2022-09-20 PROBLEM — G62.9 PERIPHERAL POLYNEUROPATHY: Status: RESOLVED | Noted: 2020-09-24 | Resolved: 2022-09-20

## 2022-09-20 PROBLEM — M48.00 SPINAL STENOSIS: Status: RESOLVED | Noted: 2022-02-10 | Resolved: 2022-09-20

## 2022-09-20 PROBLEM — M25.551 PAIN OF RIGHT HIP JOINT: Status: RESOLVED | Noted: 2019-06-20 | Resolved: 2022-09-20

## 2022-09-20 LAB
APPEARANCE UR: CLEAR
BACTERIA URNS QL MICRO: 0 /HPF
BASOPHILS # BLD: 0.1 K/UL (ref 0–0.2)
BASOPHILS NFR BLD: 1 % (ref 0–2)
BILIRUB UR QL: NEGATIVE
COLOR UR: NORMAL
DIFFERENTIAL METHOD BLD: ABNORMAL
EOSINOPHIL # BLD: 0.1 K/UL (ref 0–0.8)
EOSINOPHIL NFR BLD: 1 % (ref 0.5–7.8)
ERYTHROCYTE [DISTWIDTH] IN BLOOD BY AUTOMATED COUNT: 13.3 % (ref 11.9–14.6)
EST. AVERAGE GLUCOSE BLD GHB EST-MCNC: 117 MG/DL
GLUCOSE UR STRIP.AUTO-MCNC: NEGATIVE MG/DL
HBA1C MFR BLD: 5.7 % (ref 4.8–5.6)
HCT VFR BLD AUTO: 40.4 % (ref 41.1–50.3)
HGB BLD-MCNC: 13.5 G/DL (ref 13.6–17.2)
HGB UR QL STRIP: NEGATIVE
IMM GRANULOCYTES # BLD AUTO: 0 K/UL (ref 0–0.5)
IMM GRANULOCYTES NFR BLD AUTO: 0 % (ref 0–5)
KETONES UR QL STRIP.AUTO: NEGATIVE MG/DL
LEUKOCYTE ESTERASE UR QL STRIP.AUTO: NEGATIVE
LYMPHOCYTES # BLD: 2.3 K/UL (ref 0.5–4.6)
LYMPHOCYTES NFR BLD: 33 % (ref 13–44)
MCH RBC QN AUTO: 29.3 PG (ref 26.1–32.9)
MCHC RBC AUTO-ENTMCNC: 33.4 G/DL (ref 31.4–35)
MCV RBC AUTO: 87.8 FL (ref 79.6–97.8)
MONOCYTES # BLD: 0.9 K/UL (ref 0.1–1.3)
MONOCYTES NFR BLD: 13 % (ref 4–12)
NEUTS SEG # BLD: 3.7 K/UL (ref 1.7–8.2)
NEUTS SEG NFR BLD: 52 % (ref 43–78)
NITRITE UR QL STRIP.AUTO: NEGATIVE
NRBC # BLD: 0 K/UL (ref 0–0.2)
PH UR STRIP: 6.5 [PH] (ref 5–9)
PLATELET # BLD AUTO: 323 K/UL (ref 150–450)
PMV BLD AUTO: 9.7 FL (ref 9.4–12.3)
PROT UR STRIP-MCNC: NEGATIVE MG/DL
RBC # BLD AUTO: 4.6 M/UL (ref 4.23–5.6)
SP GR UR REFRACTOMETRY: 1.01 (ref 1–1.02)
UROBILINOGEN UR QL STRIP.AUTO: 1 EU/DL (ref 0.2–1)
WBC # BLD AUTO: 7.1 K/UL (ref 4.3–11.1)

## 2022-09-20 PROCEDURE — G0439 PPPS, SUBSEQ VISIT: HCPCS | Performed by: INTERNAL MEDICINE

## 2022-09-20 PROCEDURE — G8417 CALC BMI ABV UP PARAM F/U: HCPCS | Performed by: INTERNAL MEDICINE

## 2022-09-20 PROCEDURE — G0008 ADMIN INFLUENZA VIRUS VAC: HCPCS | Performed by: INTERNAL MEDICINE

## 2022-09-20 PROCEDURE — 90694 VACC AIIV4 NO PRSRV 0.5ML IM: CPT | Performed by: INTERNAL MEDICINE

## 2022-09-20 PROCEDURE — G8427 DOCREV CUR MEDS BY ELIG CLIN: HCPCS | Performed by: INTERNAL MEDICINE

## 2022-09-20 PROCEDURE — 1123F ACP DISCUSS/DSCN MKR DOCD: CPT | Performed by: INTERNAL MEDICINE

## 2022-09-20 PROCEDURE — 1036F TOBACCO NON-USER: CPT | Performed by: INTERNAL MEDICINE

## 2022-09-20 PROCEDURE — 99214 OFFICE O/P EST MOD 30 MIN: CPT | Performed by: INTERNAL MEDICINE

## 2022-09-20 RX ORDER — EZETIMIBE 10 MG/1
TABLET ORAL
Qty: 90 TABLET | Refills: 1 | Status: SHIPPED | OUTPATIENT
Start: 2022-09-20

## 2022-09-20 RX ORDER — ROSUVASTATIN CALCIUM 20 MG/1
20 TABLET, COATED ORAL DAILY
Qty: 90 TABLET | Refills: 1 | Status: SHIPPED | OUTPATIENT
Start: 2022-09-20

## 2022-09-20 RX ORDER — DULOXETIN HYDROCHLORIDE 30 MG/1
30 CAPSULE, DELAYED RELEASE ORAL DAILY
Qty: 90 CAPSULE | Refills: 1 | Status: SHIPPED | OUTPATIENT
Start: 2022-09-20

## 2022-09-20 RX ORDER — AMLODIPINE BESYLATE 5 MG/1
5 TABLET ORAL DAILY
Qty: 90 TABLET | Refills: 1 | Status: SHIPPED | OUTPATIENT
Start: 2022-09-20

## 2022-09-20 RX ORDER — LEVOTHYROXINE SODIUM 0.05 MG/1
50 TABLET ORAL
Qty: 90 TABLET | Refills: 1 | Status: SHIPPED | OUTPATIENT
Start: 2022-09-20

## 2022-09-20 RX ORDER — LISINOPRIL AND HYDROCHLOROTHIAZIDE 20; 12.5 MG/1; MG/1
1 TABLET ORAL DAILY
Qty: 90 TABLET | Refills: 1 | Status: SHIPPED | OUTPATIENT
Start: 2022-09-20

## 2022-09-20 RX ORDER — POTASSIUM CHLORIDE 20 MEQ/1
TABLET, EXTENDED RELEASE ORAL
Qty: 90 TABLET | Refills: 1 | Status: SHIPPED | OUTPATIENT
Start: 2022-09-20

## 2022-09-20 RX ORDER — OMEPRAZOLE 40 MG/1
40 CAPSULE, DELAYED RELEASE ORAL DAILY
Qty: 90 CAPSULE | Refills: 1 | Status: SHIPPED | OUTPATIENT
Start: 2022-09-20

## 2022-09-20 ASSESSMENT — ANXIETY QUESTIONNAIRES
IF YOU CHECKED OFF ANY PROBLEMS ON THIS QUESTIONNAIRE, HOW DIFFICULT HAVE THESE PROBLEMS MADE IT FOR YOU TO DO YOUR WORK, TAKE CARE OF THINGS AT HOME, OR GET ALONG WITH OTHER PEOPLE: NOT DIFFICULT AT ALL
7. FEELING AFRAID AS IF SOMETHING AWFUL MIGHT HAPPEN: 0
3. WORRYING TOO MUCH ABOUT DIFFERENT THINGS: 0
1. FEELING NERVOUS, ANXIOUS, OR ON EDGE: 0
4. TROUBLE RELAXING: 0
5. BEING SO RESTLESS THAT IT IS HARD TO SIT STILL: 0
6. BECOMING EASILY ANNOYED OR IRRITABLE: 0
2. NOT BEING ABLE TO STOP OR CONTROL WORRYING: 0
GAD7 TOTAL SCORE: 0

## 2022-09-20 ASSESSMENT — PATIENT HEALTH QUESTIONNAIRE - PHQ9
SUM OF ALL RESPONSES TO PHQ9 QUESTIONS 1 & 2: 0
1. LITTLE INTEREST OR PLEASURE IN DOING THINGS: 0
SUM OF ALL RESPONSES TO PHQ QUESTIONS 1-9: 0
2. FEELING DOWN, DEPRESSED OR HOPELESS: 0
SUM OF ALL RESPONSES TO PHQ QUESTIONS 1-9: 0

## 2022-09-20 ASSESSMENT — ENCOUNTER SYMPTOMS
BLOOD IN STOOL: 0
SHORTNESS OF BREATH: 0
BACK PAIN: 1

## 2022-09-20 NOTE — PATIENT INSTRUCTIONS
I recommend all standard healthcare maintenance and cancer screenings (Colon cancer screening, Mammogram and Bone Density if female and appropriate, etc) and standard immunizations (Flu, Pneumonia, Covid-19, Tetanus boosters, etc) as appropriate and due to lower your risk for potentially preventable morbidity/mortality from these diseases. Personalized Preventive Plan for Coleen Chaidez - 9/20/2022  Medicare offers a range of preventive health benefits. Some of the tests and screenings are paid in full while other may be subject to a deductible, co-insurance, and/or copay. Some of these benefits include a comprehensive review of your medical history including lifestyle, illnesses that may run in your family, and various assessments and screenings as appropriate. After reviewing your medical record and screening and assessments performed today your provider may have ordered immunizations, labs, imaging, and/or referrals for you. A list of these orders (if applicable) as well as your Preventive Care list are included within your After Visit Summary for your review. Other Preventive Recommendations:    A preventive eye exam performed by an eye specialist is recommended every 1-2 years to screen for glaucoma; cataracts, macular degeneration, and other eye disorders. A preventive dental visit is recommended every 6 months. Try to get at least 150 minutes of exercise per week or 10,000 steps per day on a pedometer . Order or download the FREE \"Exercise & Physical Activity: Your Everyday Guide\" from [employer]. Call 8-482.312.5893 or search [employer] online. You need 2840-3427 mg of calcium and 9966-9971 IU of vitamin D per day.  It is possible to meet your calcium requirement with diet alone, but a vitamin D supplement is usually necessary to meet this goal.  When exposed to the sun, use a sunscreen that protects against both UVA and UVB radiation with an SPF of 30 or greater. Reapply every 2 to 3 hours or after sweating, drying off with a towel, or swimming. Always wear a seat belt when traveling in a car. Always wear a helmet when riding a bicycle or motorcycle.

## 2022-09-20 NOTE — PROGRESS NOTES
Stevie Christopher M.D. Internal Medicine  [facility]  [address], [address]  Phone: [phone]  Fax: [phone]    Hypertension  This is a chronic problem. The current episode started more than 1 year ago. The problem has been waxing and waning since onset. The problem is controlled. Pertinent negatives include no chest pain or shortness of breath. Agents associated with hypertension include NSAIDs. Risk factors for coronary artery disease include male gender and dyslipidemia. Past treatments include ACE inhibitors, diuretics and calcium channel blockers. The current treatment provides moderate improvement. Back Pain  This is a chronic problem. The current episode started more than 1 year ago. The problem occurs constantly. The problem has been gradually worsening (Current progression/exacerbation.) since onset. The pain is present in the lumbar spine. The quality of the pain is described as aching. The pain is at a severity of 8/10. The pain is severe. Pertinent negatives include no chest pain. Ana Rosa Garcia is a 68 y.o. White (non-) male. I last saw him for routine f/u 8/2021 and recommended 6 month routine f/u. Current Outpatient Medications   Medication Sig Dispense Refill    potassium chloride (KLOR-CON M) 20 MEQ extended release tablet TAKE ONE TABLET BY MOUTH ONE TIME DAILY 30 tablet 0    ezetimibe (ZETIA) 10 MG tablet TAKE ONE TABLET BY MOUTH ONE TIME DAILY 90 tablet 0    pregabalin (LYRICA) 225 MG capsule Take 1 capsule by mouth in the morning and 1 capsule before bedtime. Do all this for 90 days.  60 capsule 2    amLODIPine (NORVASC) 5 MG tablet Take 5 mg by mouth daily      aspirin 81 MG EC tablet Take 81 mg by mouth daily      cyanocobalamin 1000 MCG tablet Take 1,000 mcg by mouth daily      Hyoscyamine Sulfate SL 0.125 MG SUBL Place 0.125 mg under the tongue every 4 hours as needed      levothyroxine (SYNTHROID) 50 MCG tablet Take 50 mcg by mouth every morning (before breakfast)      lisinopril-hydroCHLOROthiazide (PRINZIDE;ZESTORETIC) 20-12.5 MG per tablet Take 1 tablet by mouth daily      omeprazole (PRILOSEC) 40 MG delayed release capsule Take 40 mg by mouth daily      pregabalin (LYRICA) 225 MG capsule Take 225 mg by mouth 2 times daily. rosuvastatin (CRESTOR) 20 MG tablet Take 20 mg by mouth      tamsulosin (FLOMAX) 0.4 MG capsule Take 0.4 mg by mouth daily      finasteride (PROSCAR) 5 MG tablet Take 5 mg by mouth daily       No current facility-administered medications for this visit.      No Known Allergies    Past Medical History:   Diagnosis Date    Axonal polyneuropathy 2018    B12 deficiency 2019    BPH (benign prostatic hyperplasia) 2012    CAD (coronary artery disease) 2020    Ca score 468    Diverticulitis     Family history of diabetes mellitus     GERD (gastroesophageal reflux disease)     medication    HDL deficiency 2012    Hemorrhoid     int and ext    Hypertension 2012    medication    Hypokalemia     Hypothyroid     medication    Leukocytosis     Lumbar radicular pain 2019    Murmur, cardiac     echo 20  EF 55-60%    Pain of right hip joint 2019    Paresthesia of right foot 2018    Prediabetes     diet control and weight loss  21 A1c 6.4    Right leg pain 2018     Past Surgical History:   Procedure Laterality Date    COLONOSCOPY      LUMBAR LAMINECTOMY      ORTHOPEDIC SURGERY      right leg surgery to remove wire at age 12    UPPER GASTROINTESTINAL ENDOSCOPY       Social History     Tobacco Use    Smoking status: Former     Packs/day: 0.00     Types: Cigarettes     Quit date: 2000     Years since quittin.7    Smokeless tobacco: Never   Substance Use Topics    Alcohol use: No    Drug use: Not Currently     Family History   Problem Relation Age of Onset    Cancer Brother 72        lung cancer/lung disease    Cancer Sister         Had Leukemia    Diabetes Mother     Lung Disease Brother         COPD    Cancer Sister 76        breast cancer      Review of Systems   Respiratory:  Negative for shortness of breath. Cardiovascular:  Negative for chest pain. Gastrointestinal:  Negative for blood in stool. Musculoskeletal:  Positive for back pain. Physical Exam  Vitals and nursing note reviewed. Constitutional:       General: He is not in acute distress. Appearance: Normal appearance. He is not ill-appearing, toxic-appearing or diaphoretic. HENT:      Head: Normocephalic and atraumatic. Right Ear: External ear normal.      Left Ear: External ear normal.   Eyes:      General: No scleral icterus. Right eye: No discharge. Left eye: No discharge. Conjunctiva/sclera: Conjunctivae normal.   Cardiovascular:      Rate and Rhythm: Normal rate and regular rhythm. Heart sounds: Normal heart sounds. No murmur heard. No friction rub. No gallop. Pulmonary:      Effort: Pulmonary effort is normal. No respiratory distress. Breath sounds: Normal breath sounds. No stridor. No wheezing, rhonchi or rales. Abdominal:      General: Abdomen is flat. Bowel sounds are normal. There is no distension. Palpations: Abdomen is soft. There is no mass. Tenderness: There is no abdominal tenderness. There is no guarding or rebound. Musculoskeletal:      Right lower leg: No edema. Left lower leg: No edema. Skin:     General: Skin is warm and dry. Coloration: Skin is not jaundiced or pale. Findings: No erythema. Neurological:      General: No focal deficit present. Mental Status: He is alert and oriented to person, place, and time. Mental status is at baseline. Psychiatric:         Mood and Affect: Mood normal.         Behavior: Behavior normal.         Thought Content: Thought content normal.         Judgment: Judgment normal.   I have reviewed/discussed the above normal BMI with the patient.   I have recommended the following interventions: dietary management education, guidance, and counseling and encourage exercise . ASSESSMENT AND PLAN:    Low Back Pain (Chronic illness with current progression/exacerbation): Add Cymbalta. Needs labs. CAD: CAD: Ca Score 8/19/20 = 468. NM Stress 9/18/20 = Norm Perf; EF 64%. Follows with Cardiology (Dr. Tristian Goodwin). Maintained on Asa. Risk factors medically modified per below. Taking medications w/o problems. Well controlled w/o angina or MCHUGH. Continue Asa. Continue risk factor modification per below. Follow up with Dr. Emerald Garcia (Also with Thoracic Aortic Enlargement). HTN: Taking current regimen (Lisinopril-HCT 20-12.5, Norvasc 5) w/o problems. Home BPs =  120-130s/70s. Well controlled. Continue current regimen. Asked to monitor BP at home, call me if it runs above 140/80, and bring in a log next time. HLD: Taking current regimen (Crestor 20 and Zetia 10) w/o problems. Well controlled. Continue current regimen. FLPs:   1/20/20 on Pravachol 40 = [174/114/36/118]. 11/12/20 on Crestor 20 and Zetia 10 = [98/45/32/115]. Hypothyroidism: Taking current regimen (Synthroid 50) w/o problems. Well controlled. Continue current regimen. TSHs:  7/11/19 on Synthroid 50 = 4.870.   2/12/21 on Synthroid 50 = 3.570. Prediabetes: Continue to monitor. A1Cs:   12/20/18 = 5.9.   2/12/21 = 6.2. Hypokalemia: Taking current regimen (KCl 20 QDay) w/o problems. Well controlled. Continue current regimen. HCM:   Colonoscopy: 10/21/20 by Dr. Dacia Mejia = IH: Tics; No Polyps; 5 Year Repeat Rec. Prostate:   PSA's:  7/11/19 = 1.3.   8/11/20 = 1.6.   9/20/22 =   Flu: 9/20/22. Covid: Recommend staying UTD on Covid vaccinations/boosters. F/u: 6 Months. Non-fasting labs at that visit. Marinell Halon was seen today for hypertension and cholesterol problem.     Diagnoses and all orders for this visit:    Medicare annual wellness visit, subsequent    Chronic bilateral low back pain without sciatica  -     DULoxetine (CYMBALTA) 30 MG extended release capsule; Take 1 capsule by mouth daily  -     Basic Metabolic Panel; Future  -     CBC with Auto Differential; Future  -     Hepatic Function Panel; Future  -     TSH; Future  -     Urinalysis; Future    Coronary artery disease due to calcified coronary lesion  -     ezetimibe (ZETIA) 10 MG tablet; TAKE ONE TABLET BY MOUTH ONE TIME DAILY  -     lisinopril-hydroCHLOROthiazide (PRINZIDE;ZESTORETIC) 20-12.5 MG per tablet; Take 1 tablet by mouth daily  -     rosuvastatin (CRESTOR) 20 MG tablet; Take 1 tablet by mouth daily  -     Basic Metabolic Panel; Future  -     CBC with Auto Differential; Future  -     Lipid Panel; Future  -     Hepatic Function Panel; Future  -     TSH; Future  -     Urinalysis; Future    Essential hypertension  -     amLODIPine (NORVASC) 5 MG tablet; Take 1 tablet by mouth daily  -     lisinopril-hydroCHLOROthiazide (PRINZIDE;ZESTORETIC) 20-12.5 MG per tablet; Take 1 tablet by mouth daily  -     Basic Metabolic Panel; Future  -     CBC with Auto Differential; Future  -     Lipid Panel; Future  -     Hepatic Function Panel; Future  -     TSH; Future  -     Urinalysis; Future    Mixed hyperlipidemia  -     ezetimibe (ZETIA) 10 MG tablet; TAKE ONE TABLET BY MOUTH ONE TIME DAILY  -     rosuvastatin (CRESTOR) 20 MG tablet; Take 1 tablet by mouth daily  -     Basic Metabolic Panel; Future  -     CBC with Auto Differential; Future  -     Lipid Panel; Future  -     Hepatic Function Panel; Future  -     TSH; Future  -     Urinalysis; Future    Hypothyroidism due to acquired atrophy of thyroid  -     levothyroxine (SYNTHROID) 50 MCG tablet; Take 1 tablet by mouth every morning (before breakfast)  -     TSH; Future    Prediabetes  -     Basic Metabolic Panel;  Future  -     Hemoglobin A1C; Future    Hypokalemia  -     potassium chloride (KLOR-CON M) 20 MEQ extended release tablet; TAKE ONE TABLET BY MOUTH ONE TIME DAILY  - Basic Metabolic Panel; Future    Enlarged thoracic aorta ([facility] [address]  -     Basic Metabolic Panel; Future  -     CBC with Auto Differential; Future  -     Lipid Panel; Future  -     Hepatic Function Panel; Future  -     TSH; Future  -     Urinalysis; Future    Gastroesophageal reflux disease without esophagitis  -     omeprazole (PRILOSEC) 40 MG delayed release capsule; Take 1 capsule by mouth daily  -     Basic Metabolic Panel; Future  -     CBC with Auto Differential; Future  -     Hepatic Function Panel; Future    Prostate cancer screening  -     PSA Screening; Future    Encounter for immunization  -     Influenza, FLUAD, (age 72 y+), IM, Preservative Free, 0.5 mL      No follow-ups on file. Medicare Annual Wellness Visit    Misael Ledesma is here for Hypertension, Cholesterol Problem, Lower Back Pain, and Medicare AWV    Assessment & Plan   Medicare annual wellness visit, subsequent  Chronic bilateral low back pain without sciatica  -     DULoxetine (CYMBALTA) 30 MG extended release capsule; Take 1 capsule by mouth daily, Disp-90 capsule, R-1Normal  -     Basic Metabolic Panel; Future  -     CBC with Auto Differential; Future  -     Hepatic Function Panel; Future  -     TSH; Future  -     Urinalysis; Future  Coronary artery disease due to calcified coronary lesion  -     ezetimibe (ZETIA) 10 MG tablet; TAKE ONE TABLET BY MOUTH ONE TIME DAILY, Disp-90 tablet, R-1Normal  -     lisinopril-hydroCHLOROthiazide (PRINZIDE;ZESTORETIC) 20-12.5 MG per tablet; Take 1 tablet by mouth daily, Disp-90 tablet, R-1Normal  -     rosuvastatin (CRESTOR) 20 MG tablet; Take 1 tablet by mouth daily, Disp-90 tablet, R-1Normal  -     Basic Metabolic Panel; Future  -     CBC with Auto Differential; Future  -     Lipid Panel; Future  -     Hepatic Function Panel; Future  -     TSH; Future  -     Urinalysis; Future  Essential hypertension  -     amLODIPine (NORVASC) 5 MG tablet;  Take 1 tablet by mouth daily, Disp-90 tablet, R-1Normal  - lisinopril-hydroCHLOROthiazide (PRINZIDE;ZESTORETIC) 20-12.5 MG per tablet; Take 1 tablet by mouth daily, Disp-90 tablet, R-1Normal  -     Basic Metabolic Panel; Future  -     CBC with Auto Differential; Future  -     Lipid Panel; Future  -     Hepatic Function Panel; Future  -     TSH; Future  -     Urinalysis; Future  Mixed hyperlipidemia  -     ezetimibe (ZETIA) 10 MG tablet; TAKE ONE TABLET BY MOUTH ONE TIME DAILY, Disp-90 tablet, R-1Normal  -     rosuvastatin (CRESTOR) 20 MG tablet; Take 1 tablet by mouth daily, Disp-90 tablet, R-1Normal  -     Basic Metabolic Panel; Future  -     CBC with Auto Differential; Future  -     Lipid Panel; Future  -     Hepatic Function Panel; Future  -     TSH; Future  -     Urinalysis; Future  Hypothyroidism due to acquired atrophy of thyroid  -     levothyroxine (SYNTHROID) 50 MCG tablet; Take 1 tablet by mouth every morning (before breakfast), Disp-90 tablet, R-1Normal  -     TSH; Future  Prediabetes  -     Basic Metabolic Panel; Future  -     Hemoglobin A1C; Future  Hypokalemia  -     potassium chloride (KLOR-CON M) 20 MEQ extended release tablet; TAKE ONE TABLET BY MOUTH ONE TIME DAILY, Disp-90 tablet, R-1Normal  -     Basic Metabolic Panel; Future  Enlarged thoracic aorta ([address]  -     Basic Metabolic Panel; Future  -     CBC with Auto Differential; Future  -     Lipid Panel; Future  -     Hepatic Function Panel; Future  -     TSH; Future  -     Urinalysis; Future  Gastroesophageal reflux disease without esophagitis  -     omeprazole (PRILOSEC) 40 MG delayed release capsule; Take 1 capsule by mouth daily, Disp-90 capsule, R-1Normal  -     Basic Metabolic Panel; Future  -     CBC with Auto Differential; Future  -     Hepatic Function Panel; Future  Prostate cancer screening  -     PSA Screening;  Future  Encounter for immunization  -     Influenza, FLUAD, (age 72 y+), IM, Preservative Free, 0.5 mL    Recommendations for Preventive Services Due: see orders and patient instructions/AVS.  Recommended screening schedule for the next 5-10 years is provided to the patient in written form: see Patient Instructions/AVS.     Return for Medicare Annual Wellness Visit in 1 year. Subjective   The following acute and/or chronic problems were also addressed today:  Chronic low back pain with current progression/exacerbation. Patient's complete Health Risk Assessment and screening values have been reviewed and are found in Flowsheets. The following problems were reviewed today and where indicated follow up appointments were made and/or referrals ordered. Positive Risk Factor Screenings with Interventions:             General Health and ACP:       Advance Directives       Power of  Living Will ACP-Advance Directive ACP-Power of     Not on File Not on File Not on File Not on File          General Health Risk Interventions:  Pain issues: medication adjusted- Adding Cymblta. Objective   Vitals:    09/20/22 1042   BP: 131/72   Pulse: 69   Temp: 97.9 °F (36.6 °C)   TempSrc: Temporal   SpO2: 96%   Weight: 213 lb (96.6 kg)   Height: 6' 2\" (1.88 m)      Body mass index is 27.35 kg/m². No Known Allergies  Prior to Visit Medications    Medication Sig Taking?  Authorizing Provider   ezetimibe (ZETIA) 10 MG tablet TAKE ONE TABLET BY MOUTH ONE TIME DAILY Yes Anisha Umaña MD   potassium chloride (KLOR-CON M) 20 MEQ extended release tablet TAKE ONE TABLET BY MOUTH ONE TIME DAILY Yes Anisha Umaña MD   amLODIPine (NORVASC) 5 MG tablet Take 1 tablet by mouth daily Yes Anisha Umaña MD   levothyroxine (SYNTHROID) 50 MCG tablet Take 1 tablet by mouth every morning (before breakfast) Yes Anisha Umaña MD   lisinopril-hydroCHLOROthiazide (PRINZIDE;ZESTORETIC) 20-12.5 MG per tablet Take 1 tablet by mouth daily Yes Anisha Umaña MD   omeprazole (PRILOSEC) 40 MG delayed release capsule Take 1 capsule by mouth daily Yes Anisha Umaña MD   rosuvastatin (CRESTOR) 20 MG tablet Take 1 tablet by mouth daily Yes Mary Diehl MD   DULoxetine (CYMBALTA) 30 MG extended release capsule Take 1 capsule by mouth daily Yes Mary Dihel MD   pregabalin (LYRICA) 225 MG capsule Take 1 capsule by mouth in the morning and 1 capsule before bedtime. Do all this for 90 days. Yes Caty Castañeda MD   aspirin 81 MG EC tablet Take 81 mg by mouth daily Yes Ar Automatic Reconciliation   cyanocobalamin 1000 MCG tablet Take 1,000 mcg by mouth daily Yes Ar Automatic Reconciliation   Hyoscyamine Sulfate SL 0.125 MG SUBL Place 0.125 mg under the tongue every 4 hours as needed Yes Ar Automatic Reconciliation   pregabalin (LYRICA) 225 MG capsule Take 225 mg by mouth 2 times daily.  Yes Ar Automatic Reconciliation   tamsulosin (FLOMAX) 0.4 MG capsule Take 0.4 mg by mouth daily Yes Ar Automatic Reconciliation   finasteride (PROSCAR) 5 MG tablet Take 5 mg by mouth daily  Ar Automatic Reconciliation       CareTeam (Including outside providers/suppliers regularly involved in providing care):   Patient Care Team:  Mary Diehl MD as PCP - Elvi Estrada MD as PCP - [facility] Empaneled Provider     Reviewed and updated this visit:  Tobacco  Allergies  Meds  Problems  Med Hx  Surg Hx  Soc Hx  Fam Hx

## 2022-09-21 LAB
ALBUMIN SERPL-MCNC: 4.1 G/DL (ref 3.2–4.6)
ALBUMIN/GLOB SERPL: 1.4 {RATIO} (ref 1.2–3.5)
ALP SERPL-CCNC: 60 U/L (ref 50–136)
ALT SERPL-CCNC: 35 U/L (ref 12–65)
ANION GAP SERPL CALC-SCNC: 9 MMOL/L (ref 4–13)
AST SERPL-CCNC: 27 U/L (ref 15–37)
BILIRUB DIRECT SERPL-MCNC: 0.2 MG/DL
BILIRUB SERPL-MCNC: 0.6 MG/DL (ref 0.2–1.1)
BUN SERPL-MCNC: 14 MG/DL (ref 8–23)
CALCIUM SERPL-MCNC: 9.1 MG/DL (ref 8.3–10.4)
CHLORIDE SERPL-SCNC: 102 MMOL/L (ref 101–110)
CHOLEST SERPL-MCNC: 91 MG/DL
CO2 SERPL-SCNC: 23 MMOL/L (ref 21–32)
CREAT SERPL-MCNC: 0.9 MG/DL (ref 0.8–1.5)
GLOBULIN SER CALC-MCNC: 3 G/DL (ref 2.3–3.5)
GLUCOSE SERPL-MCNC: 129 MG/DL (ref 65–100)
HDLC SERPL-MCNC: 32 MG/DL (ref 40–60)
HDLC SERPL: 2.8 {RATIO}
LDLC SERPL CALC-MCNC: 29.2 MG/DL
POTASSIUM SERPL-SCNC: 4.1 MMOL/L (ref 3.5–5.1)
PROT SERPL-MCNC: 7.1 G/DL (ref 6.3–8.2)
PSA SERPL-MCNC: 2.5 NG/ML
SODIUM SERPL-SCNC: 134 MMOL/L (ref 136–145)
TRIGL SERPL-MCNC: 149 MG/DL (ref 35–150)
TSH, 3RD GENERATION: 3.56 UIU/ML (ref 0.36–3.74)
VLDLC SERPL CALC-MCNC: 29.8 MG/DL (ref 6–23)

## 2022-11-07 NOTE — PROGRESS NOTES
[facility]  Consultation Note    Patient ID:  Name: Erin Parr  MRN: [id number]  AGE: 68 y.o.  : 1945    Date of Consultation:  2022    CC:   Chief Complaint   Patient presents with    Lower Back Pain     Patient state he has pain from lower back to radiates down his right leg          HPI:  Mr. Wanda Lundberg is a 60-year-old man who presents today for evaluation of low back pain. Since I last saw him, he underwent L2-S1 laminectomy with  [facility] February 10, 2022. He has pain in the right low back. It radiates to the right leg. Since he had surgery, he has not had paresthesias in the right leg. He also has pain in the right groin, radiating to the right anterior thigh. The pain is aggravated with leaning forward or putting on his pants. He has neuropathy in his right foot and right foot pain. He takes duloxetine 30 mg and thinks that may help some. He has not had any lumbar spine or hip imaging recently. MRI lumbar spine preoperatively was performed 2021. This showed multilevel central stenosis with right L5-S1 neuroforaminal narrowing. He also had right L4-5 neuroforaminal narrowing.      Past Medical History Includes:   Past Medical History:   Diagnosis Date    Axonal polyneuropathy 2018    B12 deficiency 2019    BPH (benign prostatic hyperplasia) 2012    CAD (coronary artery disease) 2020    Ca score 468    Diverticulitis     Family history of diabetes mellitus     GERD (gastroesophageal reflux disease)     medication    HDL deficiency 2012    Hemorrhoid     int and ext    Hypertension 2012    medication    Hypokalemia     Hypothyroid     medication    Leukocytosis     Lumbar radicular pain 2019    Murmur, cardiac     echo 20  EF 55-60%    Pain of right hip joint 2019    Paresthesia of right foot 2018    Prediabetes     diet control and weight loss  21 A1c 6.4    Right leg pain     Systolic murmur 6/3/7565   ,   Past Surgical History:   Procedure Laterality Date    COLONOSCOPY      LUMBAR LAMINECTOMY      ORTHOPEDIC SURGERY      right leg surgery to remove wire at age 12    UPPER GASTROINTESTINAL ENDOSCOPY       Family History:   Family History   Problem Relation Age of Onset    Cancer Brother 72        lung cancer/lung disease    Cancer Sister         Had Leukemia    Diabetes Mother     Lung Disease Brother         COPD    Cancer Sister 76        breast cancer      Social History:   Social History     Tobacco Use    Smoking status: Former     Packs/day: 0.00     Types: Cigarettes     Quit date: 2000     Years since quittin.8    Smokeless tobacco: Never   Substance Use Topics    Alcohol use: No       ALLERGIES: No Known Allergies     Patient Medications    Current Outpatient Medications   Medication Sig    DULoxetine (CYMBALTA) 60 MG extended release capsule Take 1 capsule by mouth daily    pregabalin (LYRICA) 225 MG capsule Take 1 capsule by mouth 2 times daily for 90 days. ezetimibe (ZETIA) 10 MG tablet TAKE ONE TABLET BY MOUTH ONE TIME DAILY    potassium chloride (KLOR-CON M) 20 MEQ extended release tablet TAKE ONE TABLET BY MOUTH ONE TIME DAILY    amLODIPine (NORVASC) 5 MG tablet Take 1 tablet by mouth daily    levothyroxine (SYNTHROID) 50 MCG tablet Take 1 tablet by mouth every morning (before breakfast)    lisinopril-hydroCHLOROthiazide (PRINZIDE;ZESTORETIC) 20-12.5 MG per tablet Take 1 tablet by mouth daily    omeprazole (PRILOSEC) 40 MG delayed release capsule Take 1 capsule by mouth daily    rosuvastatin (CRESTOR) 20 MG tablet Take 1 tablet by mouth daily    DULoxetine (CYMBALTA) 30 MG extended release capsule Take 1 capsule by mouth daily    pregabalin (LYRICA) 225 MG capsule Take 1 capsule by mouth in the morning and 1 capsule before bedtime. Do all this for 90 days.     aspirin 81 MG EC tablet Take 81 mg by mouth daily    cyanocobalamin 1000 MCG tablet Take 1,000 mcg by mouth daily    finasteride (PROSCAR) 5 MG tablet Take 5 mg by mouth daily    Hyoscyamine Sulfate SL 0.125 MG SUBL Place 0.125 mg under the tongue every 4 hours as needed    pregabalin (LYRICA) 225 MG capsule Take 225 mg by mouth 2 times daily. tamsulosin (FLOMAX) 0.4 MG capsule Take 0.4 mg by mouth daily     No current facility-administered medications for this visit. ROS/Meds/PSH/PMH/FH/SH: I personally reviewed the patients standard intake form. Physical Exam:      Lumbar: PE: General: Awake, alert, no distress. HEENT: Mucous membranes moist and pink. Cher Danes Psych: Appropriate and conversant. Derm: No rash Gait: Unassisted, non-ataxic MS: Straight leg raise negative bilaterally. He has right posterolateral hip pain and right groin discomfort with right hip internal rotation and resisted right hip flexion. No pain with left hip internal or external rotation or with resisted hip flexion on the left. He has pain with lumbar flexion or extension. He has right low back pain and right leg paresthesias with right lumbar facet load. Non-tender lumbar spine and paraspinals. Strength is 5/5 and symmetric in the bilateral lower extremities. No foot drop. Neurological: Sensation to light touch is intact in the bilateral lower extremities. Reflexes are absent and symmetric in the bilateral lower extremities. VITALS: @IPVITALS(8:)@ , [id number]       No flowsheet data found. Lumbar spine, pelvis, right hip XR: AP, Lateral views     Clinical Indication    ICD-10-CM    1. Spinal stenosis, lumbar region with neurogenic claudication  M48.062 XR LUMBAR SPINE (2-3 VIEWS)     DULoxetine (CYMBALTA) 60 MG extended release capsule     pregabalin (LYRICA) 225 MG capsule     MRI LUMBAR SPINE WO CONTRAST     MRI LUMBAR SPINE W CONTRAST      2.  Disc degeneration, lumbar  M51.36 XR LUMBAR SPINE (2-3 VIEWS)     DULoxetine (CYMBALTA) 60 MG extended release capsule     pregabalin (LYRICA) 225 MG capsule MRI LUMBAR SPINE WO CONTRAST     MRI LUMBAR SPINE W CONTRAST      3. Radiculopathy, lumbar region  M54.16 XR LUMBAR SPINE (2-3 VIEWS)     DULoxetine (CYMBALTA) 60 MG extended release capsule     pregabalin (LYRICA) 225 MG capsule     MRI LUMBAR SPINE WO CONTRAST     MRI LUMBAR SPINE W CONTRAST      4. Pain in right leg  M79.604 XR HIP 2-3 VW W PELVIS RIGHT      5. Right hip pain  M25.551              Report: AP, lateral x-ray of the lumbar spine, pelvis, right hip demonstrates no significant abnormality of the pelvis or right hip. L2-S1 laminectomy with multilevel degenerative change of the lumbar spine, lumbar scoliosis. We discussed these findings and reviewed these images together today. Impression: no significant abnormality of the pelvis or right hip. L2-S1 laminectomy with multilevel degenerative change of the lumbar spine, lumbar scoliosis. Cody Loyd MD            Results for orders placed or performed in visit on 11/08/22   XR LUMBAR SPINE (2-3 VIEWS)    Narrative    AUTOMATIC ADMINISTRATIVE RESULT    The result for this exam can be found in the Progress note in the chart. Impression    See Progress note in the chart. XR HIP 2-3 VW W PELVIS RIGHT    Narrative    AUTOMATIC ADMINISTRATIVE RESULT    The result for this exam can be found in the Progress note in the chart. Impression    See Progress note in the chart. Assessment and Plan:     ICD-10-CM    1. Spinal stenosis, lumbar region with neurogenic claudication  M48.062 XR LUMBAR SPINE (2-3 VIEWS)     DULoxetine (CYMBALTA) 60 MG extended release capsule     pregabalin (LYRICA) 225 MG capsule     MRI LUMBAR SPINE WO CONTRAST     MRI LUMBAR SPINE W CONTRAST      2. Disc degeneration, lumbar  M51.36 XR LUMBAR SPINE (2-3 VIEWS)     DULoxetine (CYMBALTA) 60 MG extended release capsule     pregabalin (LYRICA) 225 MG capsule     MRI LUMBAR SPINE WO CONTRAST     MRI LUMBAR SPINE W CONTRAST      3.  Radiculopathy, lumbar region  M54.16 XR LUMBAR SPINE (2-3 VIEWS)     DULoxetine (CYMBALTA) 60 MG extended release capsule     pregabalin (LYRICA) 225 MG capsule     MRI LUMBAR SPINE WO CONTRAST     MRI LUMBAR SPINE W CONTRAST      4. Pain in right leg  M79.604 XR HIP 2-3 VW W PELVIS RIGHT      5. Right hip pain  M25.551           Orders Placed This Encounter   Procedures    XR LUMBAR SPINE (2-3 VIEWS)     Standing Status:   Future     Number of Occurrences:   1     Standing Expiration Date:   11/8/2023    XR HIP 2-3 VW W PELVIS RIGHT     PELVIS VIEW     Standing Status:   Future     Number of Occurrences:   1     Standing Expiration Date:   11/8/2023    MRI LUMBAR SPINE WO CONTRAST     Standing Status:   Future     Standing Expiration Date:   11/8/2023     Order Specific Question:   What is the sedation requirement? Answer:   None    MRI LUMBAR SPINE W CONTRAST     Standing Status:   Future     Standing Expiration Date:   11/8/2023     Scheduling Instructions:      PLEASE CALL AND SCHEDULE PATIENT     Order Specific Question:   STAT Creatinine as needed:     Answer:   No     Order Specific Question:   What is the sedation requirement? Answer:   None        4   This is a chronic illness/condition with exacerbation and progression  Treatment at this time: Prescription Drug Management. For neuropathic pain relief, he will increase the Cymbalta from 30 mg once a day to 60 mg once a day. Also for neuropathic pain relief, he was given a refill for Lyrica and counseled regarding the possibility of risk, complications, and alternative treatment options from these medications. We discussed if his groin pain persists or worsens and does not respond to treatment for the lumbar spine, he may benefit from consultation with one of our hip specialists to consider an intra-articular hip injection or right hip MRI, although there is no significant abnormality of the right hip on today's x-ray.     Studies ordered today: MRI lumbar spine      Valentino Belts, MD  11/8/2022,  5:13 PM

## 2022-11-08 ENCOUNTER — OFFICE VISIT (OUTPATIENT)
Dept: ORTHOPEDIC SURGERY | Age: 77
End: 2022-11-08
Payer: MEDICARE

## 2022-11-08 DIAGNOSIS — M54.16 RADICULOPATHY, LUMBAR REGION: ICD-10-CM

## 2022-11-08 DIAGNOSIS — M25.551 RIGHT HIP PAIN: ICD-10-CM

## 2022-11-08 DIAGNOSIS — M51.36 DISC DEGENERATION, LUMBAR: ICD-10-CM

## 2022-11-08 DIAGNOSIS — M79.604 PAIN IN RIGHT LEG: ICD-10-CM

## 2022-11-08 DIAGNOSIS — M48.062 SPINAL STENOSIS, LUMBAR REGION WITH NEUROGENIC CLAUDICATION: Primary | ICD-10-CM

## 2022-11-08 PROCEDURE — 1036F TOBACCO NON-USER: CPT | Performed by: PHYSICAL MEDICINE & REHABILITATION

## 2022-11-08 PROCEDURE — G8427 DOCREV CUR MEDS BY ELIG CLIN: HCPCS | Performed by: PHYSICAL MEDICINE & REHABILITATION

## 2022-11-08 PROCEDURE — G8484 FLU IMMUNIZE NO ADMIN: HCPCS | Performed by: PHYSICAL MEDICINE & REHABILITATION

## 2022-11-08 PROCEDURE — G8417 CALC BMI ABV UP PARAM F/U: HCPCS | Performed by: PHYSICAL MEDICINE & REHABILITATION

## 2022-11-08 PROCEDURE — 1123F ACP DISCUSS/DSCN MKR DOCD: CPT | Performed by: PHYSICAL MEDICINE & REHABILITATION

## 2022-11-08 PROCEDURE — 99214 OFFICE O/P EST MOD 30 MIN: CPT | Performed by: PHYSICAL MEDICINE & REHABILITATION

## 2022-11-08 RX ORDER — DULOXETIN HYDROCHLORIDE 60 MG/1
60 CAPSULE, DELAYED RELEASE ORAL DAILY
Qty: 30 CAPSULE | Refills: 3 | Status: SHIPPED | OUTPATIENT
Start: 2022-11-08 | End: 2023-02-06

## 2022-11-08 RX ORDER — PREGABALIN 225 MG/1
225 CAPSULE ORAL 2 TIMES DAILY
Qty: 180 CAPSULE | Refills: 3 | Status: SHIPPED | OUTPATIENT
Start: 2022-11-08 | End: 2023-02-06

## 2022-11-08 NOTE — LETTER
Alease Scar  1945  ______________________________________________________________________    Radiographic Studies:    Cervical MRI/ Contrast        Thoracic MRI/ Contrast        Lumbar MRI/ Contrast    CT Myelogram __________________________________________________    NCS/EMG______________________________________________________    MRI of ________________________________________________________    Other__________________________________________________________      Injections:    _______________________________________________________________    Authorization to stop blood thinners________________________________      Medications:    Oral steroids___________________    Muscle Relaxers___________________    Pain medications_____________________    NSAIDS_____________________    Neuropathic pain medication_________________________________________      Physical Therapy:    Lumbar     Thoracic     Cervical       Other_______________________________      Follow up/ Referral:    Pain referral_______________________________________________________    Referral___________________________________________________________    Follow up_________________________________________________________    Handicapped Parking_______________________________________________    Other_____________________________________________________________

## 2022-11-09 ENCOUNTER — TELEPHONE (OUTPATIENT)
Dept: ORTHOPEDIC SURGERY | Age: 77
End: 2022-11-09

## 2022-11-09 DIAGNOSIS — M48.061 SPINAL STENOSIS OF LUMBAR REGION AT MULTIPLE LEVELS: Primary | ICD-10-CM

## 2022-11-09 NOTE — TELEPHONE ENCOUNTER
The MRI L spine and separate orders for l spine with and l spine without needs to be combined to  L spine with and without  per Viveca Ananth w/ [facility].  [phone]

## 2022-11-21 ENCOUNTER — OFFICE VISIT (OUTPATIENT)
Dept: UROLOGY | Age: 77
End: 2022-11-21
Payer: MEDICARE

## 2022-11-21 DIAGNOSIS — N40.1 BENIGN PROSTATIC HYPERPLASIA WITH URINARY RETENTION: Primary | ICD-10-CM

## 2022-11-21 DIAGNOSIS — R33.8 BENIGN PROSTATIC HYPERPLASIA WITH URINARY RETENTION: Primary | ICD-10-CM

## 2022-11-21 DIAGNOSIS — R39.198 OTHER DIFFICULTIES WITH MICTURITION: ICD-10-CM

## 2022-11-21 LAB
BILIRUBIN, URINE, POC: NEGATIVE
BLOOD URINE, POC: NORMAL
GLUCOSE URINE, POC: NEGATIVE
KETONES, URINE, POC: NEGATIVE
LEUKOCYTE ESTERASE, URINE, POC: NEGATIVE
NITRITE, URINE, POC: NEGATIVE
PH, URINE, POC: 7 (ref 4.6–8)
PROTEIN,URINE, POC: NEGATIVE
PVR, POC: 180 CC
SPECIFIC GRAVITY, URINE, POC: 1.02 (ref 1–1.03)
URINALYSIS CLARITY, POC: NORMAL
URINALYSIS COLOR, POC: NORMAL
UROBILINOGEN, POC: NORMAL

## 2022-11-21 PROCEDURE — 51798 US URINE CAPACITY MEASURE: CPT | Performed by: UROLOGY

## 2022-11-21 PROCEDURE — G8417 CALC BMI ABV UP PARAM F/U: HCPCS | Performed by: UROLOGY

## 2022-11-21 PROCEDURE — 1036F TOBACCO NON-USER: CPT | Performed by: UROLOGY

## 2022-11-21 PROCEDURE — 1123F ACP DISCUSS/DSCN MKR DOCD: CPT | Performed by: UROLOGY

## 2022-11-21 PROCEDURE — 81003 URINALYSIS AUTO W/O SCOPE: CPT | Performed by: UROLOGY

## 2022-11-21 PROCEDURE — G8484 FLU IMMUNIZE NO ADMIN: HCPCS | Performed by: UROLOGY

## 2022-11-21 PROCEDURE — 99213 OFFICE O/P EST LOW 20 MIN: CPT | Performed by: UROLOGY

## 2022-11-21 PROCEDURE — G8427 DOCREV CUR MEDS BY ELIG CLIN: HCPCS | Performed by: UROLOGY

## 2022-11-21 ASSESSMENT — ENCOUNTER SYMPTOMS
VOMITING: 0
EYE DISCHARGE: 0
BACK PAIN: 0
EYE PAIN: 0
INDIGESTION: 0
SHORTNESS OF BREATH: 0
BLOOD IN STOOL: 0
WHEEZING: 0
HEARTBURN: 0
DIARRHEA: 0
CONSTIPATION: 0
NAUSEA: 0
SKIN LESIONS: 0
COUGH: 0
ABDOMINAL PAIN: 0

## 2022-11-21 NOTE — PROGRESS NOTES
[facility]  [address] [facility]   [address]  [address] [facility], [address] [place] [phone]    Gricelda Jensen  : 1945    CC: BPH/LUTS     HPI     Gricelda Jensen is a 68 y.o.  male with a PMH of BPH/Urinary retention s/p urolift 22 who presents for follow up today. Today, he reports voiding well on his own after surgery. He is off of flomax and finasteride since surgery and doing well. Only issue is he reports NO urge to urinate and often will go 6-8 hours without voiding. No hematuria. No UTis. No urinary incontinence.       PVR: 180 cc today     IPSS: 10      Lab Results   Component Value Date    PSA 2.5 2022    PSA 1.5 2021    PSA 1.6 2020       Past Medical History:   Diagnosis Date    Axonal polyneuropathy 2018    B12 deficiency 2019    BPH (benign prostatic hyperplasia) 2012    CAD (coronary artery disease) 2020    Ca score 468    Diverticulitis     Family history of diabetes mellitus     GERD (gastroesophageal reflux disease)     medication    HDL deficiency 2012    Hemorrhoid     int and ext    Hypertension 2012    medication    Hypokalemia     Hypothyroid     medication    Leukocytosis     Lumbar radicular pain 2019    Murmur, cardiac     echo 20  EF 55-60%    Pain of right hip joint 2019    Paresthesia of right foot 2018    Prediabetes     diet control and weight loss  21 A1c 6.4    Right leg pain     Systolic murmur 1500     Past Surgical History:   Procedure Laterality Date    COLONOSCOPY      LUMBAR LAMINECTOMY      ORTHOPEDIC SURGERY      right leg surgery to remove wire at age 12    UPPER GASTROINTESTINAL ENDOSCOPY       Current Outpatient Medications   Medication Sig Dispense Refill    DULoxetine (CYMBALTA) 60 MG extended release capsule Take 1 capsule by mouth daily 30 capsule 3    pregabalin (LYRICA) 225 MG capsule Take 1 capsule by mouth 2 times daily for 90 days. 180 capsule 3    ezetimibe (ZETIA) 10 MG tablet TAKE ONE TABLET BY MOUTH ONE TIME DAILY 90 tablet 1    potassium chloride (KLOR-CON M) 20 MEQ extended release tablet TAKE ONE TABLET BY MOUTH ONE TIME DAILY 90 tablet 1    amLODIPine (NORVASC) 5 MG tablet Take 1 tablet by mouth daily 90 tablet 1    levothyroxine (SYNTHROID) 50 MCG tablet Take 1 tablet by mouth every morning (before breakfast) 90 tablet 1    lisinopril-hydroCHLOROthiazide (PRINZIDE;ZESTORETIC) 20-12.5 MG per tablet Take 1 tablet by mouth daily 90 tablet 1    omeprazole (PRILOSEC) 40 MG delayed release capsule Take 1 capsule by mouth daily 90 capsule 1    rosuvastatin (CRESTOR) 20 MG tablet Take 1 tablet by mouth daily 90 tablet 1    DULoxetine (CYMBALTA) 30 MG extended release capsule Take 1 capsule by mouth daily 90 capsule 1    aspirin 81 MG EC tablet Take 81 mg by mouth daily      cyanocobalamin 1000 MCG tablet Take 1,000 mcg by mouth daily      Hyoscyamine Sulfate SL 0.125 MG SUBL Place 0.125 mg under the tongue every 4 hours as needed      pregabalin (LYRICA) 225 MG capsule Take 225 mg by mouth 2 times daily. pregabalin (LYRICA) 225 MG capsule Take 1 capsule by mouth in the morning and 1 capsule before bedtime. Do all this for 90 days. 60 capsule 2    finasteride (PROSCAR) 5 MG tablet Take 5 mg by mouth daily      tamsulosin (FLOMAX) 0.4 MG capsule Take 0.4 mg by mouth daily (Patient not taking: Reported on 2022)       No current facility-administered medications for this visit.      No Known Allergies  Social History     Socioeconomic History    Marital status:      Spouse name: Not on file    Number of children: Not on file    Years of education: Not on file    Highest education level: Not on file   Occupational History    Not on file   Tobacco Use    Smoking status: Former     Packs/day: 0.00     Types: Cigarettes     Quit date: 2000     Years since quittin.9    Smokeless tobacco: Never   Substance and Sexual Activity    Alcohol use: No    Drug use: Not Currently    Sexual activity: Not on file   Other Topics Concern    Not on file   Social History Narrative    / 2 Children/ father  from muscle disease     Social Determinants of Health     Financial Resource Strain: Not on file   Food Insecurity: Not on file   Transportation Needs: Not on file   Physical Activity: Not on file   Stress: Not on file   Social Connections: Not on file   Intimate Partner Violence: Not on file   Housing Stability: Not on file     Family History   Problem Relation Age of Onset    Cancer Brother 72        lung cancer/lung disease    Cancer Sister         Had Leukemia    Diabetes Mother     Lung Disease Brother         COPD    Cancer Sister 76        breast cancer       Review of Systems  Constitutional:   Negative for fever, chills, appetite change, malaise/fatigue, headaches and weight loss. Skin:  Negative for skin lesions, rash and itching. Eyes:  Negative for visual disturbance, eye pain and eye discharge. ENT:  Negative for difficulty articulating words, pain swallowing, high frequency hearing loss and dry mouth. Respiratory:  Negative for cough, blood in sputum, shortness of breath and wheezing. Cardiovascular:  Negative for chest pain, hypertension, irregular heartbeat, leg pain, leg swelling, regular rate and rhythm and varicose veins. GI:  Negative for nausea, vomiting, abdominal pain, blood in stool, constipation, diarrhea, indigestion and heartburn. Genitourinary:  Negative for urinary burning, hematuria, flank pain, recurrent UTIs, history of urolithiasis, nocturia, slower stream, straining, urgency, leakage w/ urge, frequent urination, incomplete emptying, erectile dysfunction, testicular pain, sexually transmitted disease, discharge and urethral stricture. Musculoskeletal:  Negative for back pain, bone pain, arthralgias, tenderness, muscle weakness and neck pain.   Neurological:  Negative for dizziness, focal weakness, numbness, seizures and tremors. Psychological:  Negative for depression and psychiatric problem. Endocrine:  Negative for cold intolerance, thirst, excessive urination, fatigue and heat intolerance. Hem/Lymphatic:  Negative for easy bleeding, easy bruising and frequent infections. Urinalysis  UA - Dipstick  @LASTPROCAMB([id number])@    UA - Micro  WBC - 0  RBC - 0  Bacteria - 0  Epith - 0    There were no vitals taken for this visit. GENERAL: No acute distress, Awake, Alert, Oriented X 3, Gait normal  CARDIAC: regular rate and rhythm  CHEST AND LUNG: Easy work of breathing, clear to auscultation bilaterally, no cyanosis  ABDOMEN: soft, non tender, non-distended, positive bowel sounds, no organomegaly, no palpable masses, no guarding, no rebound tenderness  SKIN: No rash, no erythema, no lacerations or abrasions, no ecchymosis  NEUROLOGIC: cranial nerves 2-12 grossly intact       Assessment and Plan    ICD-10-CM    1. Benign prostatic hyperplasia with urinary retention  N40.1 AMB POC URINALYSIS DIP STICK AUTO W/O MICRO    R33.8 AMB POC PVR, SAROJ,POST-VOID RES,US,NON-IMAGING      2. Other difficulties with micturition  R39.198 AMB POC URINALYSIS DIP STICK AUTO W/O MICRO     AMB POC PVR, SAROJ,POST-VOID RES,US,NON-IMAGING        BPH:     Doing well. Off meds. Follow up PRN    I have spent 20 minutes today reviewing previous notes, test results and face to face with the patient as well as documenting. Supa Bach Mai, M.D.     [facility]  [address]  Phone: [phone]  Fax: [phone]

## 2022-11-23 ENCOUNTER — HOSPITAL ENCOUNTER (OUTPATIENT)
Dept: MRI IMAGING | Age: 77
Discharge: HOME | End: 2022-11-26
Payer: MEDICARE

## 2022-11-23 DIAGNOSIS — M48.061 SPINAL STENOSIS OF LUMBAR REGION AT MULTIPLE LEVELS: ICD-10-CM

## 2022-11-23 PROCEDURE — A9579 GAD-BASE MR CONTRAST NOS,1ML: HCPCS | Performed by: PHYSICAL MEDICINE & REHABILITATION

## 2022-11-23 PROCEDURE — 6360000004 HC RX CONTRAST MEDICATION: Performed by: PHYSICAL MEDICINE & REHABILITATION

## 2022-11-23 PROCEDURE — 72158 MRI LUMBAR SPINE W/O & W/DYE: CPT

## 2022-11-23 PROCEDURE — 2580000003 HC RX 258: Performed by: PHYSICAL MEDICINE & REHABILITATION

## 2022-11-23 RX ORDER — SODIUM CHLORIDE 0.9 % (FLUSH) 0.9 %
20 SYRINGE (ML) INJECTION AS NEEDED
Status: DISCONTINUED | OUTPATIENT
Start: 2022-11-23 | End: 2022-11-27 | Stop reason: HOSPADM

## 2022-11-23 RX ADMIN — GADOTERIDOL 20 ML: 279.3 INJECTION, SOLUTION INTRAVENOUS at 09:25

## 2022-11-23 RX ADMIN — SODIUM CHLORIDE, PRESERVATIVE FREE 20 ML: 5 INJECTION INTRAVENOUS at 09:25

## 2022-12-01 NOTE — PROGRESS NOTES
[facility]  Consultation Note  Virtual/Telephone Visit     Patient ID:  Name: Perri Schwab  MRN: [id number]  AGE: 68 y.o.  : 1945    Date of Consultation:  2022    CC:   Chief Complaint   Patient presents with    Back Pain           HPI:  Today's visit was performed through a telephone visit with live audio feed. The patient was at home in [place]. I was in the office. No other persons were present. Verbal informed consent was obtained prior to today's visit, which lasted 7 minutes and included evaluation and management of symptoms, review of imaging, review of previous treatments, and discussion of treatment options. Please see the note below for more detailed information regarding today's visit. Mr. Jayson Rivero is a 31-year-old man who presents today for follow-up of low back pain. He continues to have some pain in the lower back. However, the worst of the pain is burning paresthesias in the right leg, to the foot. It awakens him at night. Lyrica only helps mildly. MRI lumbar spine performed 2022. This showed scoliosis with multilevel degenerative change. On my review of the films, he does have mild to moderate bilateral neuroforaminal narrowing and lateral recess narrowing at L4-5, but there is no severe central or neuroforaminal narrowing. We discussed these findings at length today.      Past Medical History Includes:   Past Medical History:   Diagnosis Date    Axonal polyneuropathy 2018    B12 deficiency 2019    BPH (benign prostatic hyperplasia) 2012    CAD (coronary artery disease) 2020    Ca score 468    Diverticulitis     Family history of diabetes mellitus     GERD (gastroesophageal reflux disease)     medication    HDL deficiency 2012    Hemorrhoid     int and ext    Hypertension 2012    medication    Hypokalemia     Hypothyroid     medication    Leukocytosis     Lumbar radicular pain 2019    Murmur, cardiac     echo 20  EF 55-60%    Pain of right hip joint 2019    Paresthesia of right foot 2018    Prediabetes     diet control and weight loss  21 A1c 6.4    Right leg pain     Systolic murmur 2201   ,   Past Surgical History:   Procedure Laterality Date    COLONOSCOPY      LUMBAR LAMINECTOMY      ORTHOPEDIC SURGERY      right leg surgery to remove wire at age 12    UPPER GASTROINTESTINAL ENDOSCOPY       Family History:   Family History   Problem Relation Age of Onset    Cancer Brother 72        lung cancer/lung disease    Cancer Sister         Had Leukemia    Diabetes Mother     Lung Disease Brother         COPD    Cancer Sister 76        breast cancer      Social History:   Social History     Tobacco Use    Smoking status: Former     Packs/day: 0.00     Types: Cigarettes     Quit date: 2000     Years since quittin.9    Smokeless tobacco: Never   Substance Use Topics    Alcohol use: No       ALLERGIES: No Known Allergies     Patient Medications    Current Outpatient Medications   Medication Sig    DULoxetine (CYMBALTA) 60 MG extended release capsule Take 1 capsule by mouth daily    pregabalin (LYRICA) 225 MG capsule Take 1 capsule by mouth 2 times daily for 90 days.     ezetimibe (ZETIA) 10 MG tablet TAKE ONE TABLET BY MOUTH ONE TIME DAILY    potassium chloride (KLOR-CON M) 20 MEQ extended release tablet TAKE ONE TABLET BY MOUTH ONE TIME DAILY    amLODIPine (NORVASC) 5 MG tablet Take 1 tablet by mouth daily    levothyroxine (SYNTHROID) 50 MCG tablet Take 1 tablet by mouth every morning (before breakfast)    lisinopril-hydroCHLOROthiazide (PRINZIDE;ZESTORETIC) 20-12.5 MG per tablet Take 1 tablet by mouth daily    omeprazole (PRILOSEC) 40 MG delayed release capsule Take 1 capsule by mouth daily    rosuvastatin (CRESTOR) 20 MG tablet Take 1 tablet by mouth daily    DULoxetine (CYMBALTA) 30 MG extended release capsule Take 1 capsule by mouth daily pregabalin (LYRICA) 225 MG capsule Take 1 capsule by mouth in the morning and 1 capsule before bedtime. Do all this for 90 days. aspirin 81 MG EC tablet Take 81 mg by mouth daily    cyanocobalamin 1000 MCG tablet Take 1,000 mcg by mouth daily    finasteride (PROSCAR) 5 MG tablet Take 5 mg by mouth daily    Hyoscyamine Sulfate SL 0.125 MG SUBL Place 0.125 mg under the tongue every 4 hours as needed    pregabalin (LYRICA) 225 MG capsule Take 225 mg by mouth 2 times daily. tamsulosin (FLOMAX) 0.4 MG capsule Take 0.4 mg by mouth daily (Patient not taking: Reported on 11/21/2022)     No current facility-administered medications for this visit. ROS/Meds/PSH/PMH/FH/SH: I personally reviewed the patients standard intake form. No flowsheet data found. No results found for any visits on 12/02/22. Assessment and Plan:     ICD-10-CM    1. Lumbar spondylosis  M47.816 External Referral To Neurology      2. Disc degeneration, lumbar  M51.36 External Referral To Neurology          Orders Placed This Encounter   Procedures    External Referral To Neurology     Referral Priority:   Routine     Referral Type:   Eval and Treat     Referral Reason:   Specialty Services Required     Requested Specialty:   Neurology     Number of Visits Requested:   1          4   This is a chronic illness/condition with exacerbation and progression  Treatment at this time: I will refer him for neurology consultation. He is having a lot of neuropathic pain in the right leg, and I do not feel that is all coming from lumbar radiculopathy. He is on Lyrica with some improvement in his symptoms, but the pain still severely affects his functioning and awakens him at night.     Studies ordered today: none    On this date 12/2/2022 I have spent 7 minutes reviewing previous notes, test results and face to face with the patient discussing the diagnosis and importance of compliance with the treatment plan as well as documenting on the day of the visit. Reynolds Councilman, was evaluated through a synchronous (real-time) audio-video encounter. The patient (or guardian if applicable) is aware that this is a billable service. Verbal consent to proceed has been obtained within the past 12 months. The visit was conducted pursuant to the emergency declaration under the [address] [place] [address] authority and the [employer] and [employer] General Act. Patient identification was verified, and a caregiver was present when appropriate. The patient was located in a state where the provider was credentialed to provide care.        Olivia Nguyễn MD  12/2/2022,  11:44 AM

## 2022-12-02 ENCOUNTER — TELEMEDICINE (OUTPATIENT)
Dept: ORTHOPEDIC SURGERY | Age: 77
End: 2022-12-02

## 2022-12-02 DIAGNOSIS — M51.36 DISC DEGENERATION, LUMBAR: ICD-10-CM

## 2022-12-02 DIAGNOSIS — M47.816 LUMBAR SPONDYLOSIS: Primary | ICD-10-CM

## 2022-12-02 NOTE — LETTER
Olman Marshall  1945  ______________________________________________________________________    Radiographic Studies:    Cervical MRI/ Contrast        Thoracic MRI/ Contrast        Lumbar MRI/ Contrast    CT Myelogram __________________________________________________    NCS/EMG______________________________________________________    MRI of ________________________________________________________    Other__________________________________________________________      Injections:    _______________________________________________________________    Authorization to stop blood thinners________________________________      Medications:    Oral steroids___________________    Muscle Relaxers___________________    Pain medications_____________________    NSAIDS_____________________    Neuropathic pain medication_________________________________________      Physical Therapy:    Lumbar     Thoracic     Cervical       Other_______________________________      Follow up/ Referral:    Pain referral_______________________________________________________    Referral___________________________________________________________    Follow up_________________________________________________________    Handicapped Parking_______________________________________________    Other_____________________________________________________________

## 2022-12-09 ENCOUNTER — TELEPHONE (OUTPATIENT)
Dept: ORTHOPEDIC SURGERY | Age: 77
End: 2022-12-09

## 2022-12-09 NOTE — TELEPHONE ENCOUNTER
Pt called and asked for us to send the neurology referral to [facility] again.  He hasn't received a call

## 2023-02-05 ENCOUNTER — HOSPITAL ENCOUNTER (EMERGENCY)
Age: 78
Discharge: HOME | End: 2023-02-06
Attending: EMERGENCY MEDICINE
Payer: MEDICARE

## 2023-02-05 DIAGNOSIS — R33.9 URINARY RETENTION: Primary | ICD-10-CM

## 2023-02-05 DIAGNOSIS — K59.00 CONSTIPATION, UNSPECIFIED CONSTIPATION TYPE: ICD-10-CM

## 2023-02-05 LAB
APPEARANCE UR: CLEAR
BILIRUB UR QL: NEGATIVE
COLOR UR: NORMAL
GLUCOSE UR STRIP.AUTO-MCNC: NEGATIVE MG/DL
HGB UR QL STRIP: NEGATIVE
KETONES UR QL STRIP.AUTO: NEGATIVE MG/DL
LEUKOCYTE ESTERASE UR QL STRIP.AUTO: NEGATIVE
NITRITE UR QL STRIP.AUTO: NEGATIVE
PH UR STRIP: 7 (ref 5–9)
PROT UR STRIP-MCNC: NEGATIVE MG/DL
SP GR UR REFRACTOMETRY: 1.01 (ref 1–1.02)
UROBILINOGEN UR QL STRIP.AUTO: 0.2 EU/DL (ref 0.2–1)

## 2023-02-05 PROCEDURE — 99283 EMERGENCY DEPT VISIT LOW MDM: CPT

## 2023-02-05 PROCEDURE — 81003 URINALYSIS AUTO W/O SCOPE: CPT

## 2023-02-05 PROCEDURE — 51702 INSERT TEMP BLADDER CATH: CPT

## 2023-02-05 PROCEDURE — 6370000000 HC RX 637 (ALT 250 FOR IP): Performed by: EMERGENCY MEDICINE

## 2023-02-05 RX ORDER — OXYCODONE AND ACETAMINOPHEN 7.5; 325 MG/1; MG/1
1 TABLET ORAL
Status: COMPLETED | OUTPATIENT
Start: 2023-02-05 | End: 2023-02-05

## 2023-02-05 RX ORDER — LIDOCAINE HYDROCHLORIDE 20 MG/ML
JELLY TOPICAL
Status: COMPLETED | OUTPATIENT
Start: 2023-02-05 | End: 2023-02-05

## 2023-02-05 RX ADMIN — OXYCODONE AND ACETAMINOPHEN 1 TABLET: 7.5; 325 TABLET ORAL at 19:17

## 2023-02-05 RX ADMIN — LIDOCAINE HYDROCHLORIDE: 20 JELLY TOPICAL at 15:30

## 2023-02-05 ASSESSMENT — PAIN SCALES - GENERAL
PAINLEVEL_OUTOF10: 10
PAINLEVEL_OUTOF10: 9

## 2023-02-05 ASSESSMENT — PAIN DESCRIPTION - LOCATION
LOCATION: BACK
LOCATION: ABDOMEN

## 2023-02-05 ASSESSMENT — ENCOUNTER SYMPTOMS
NAUSEA: 0
CONSTIPATION: 1
VOMITING: 0
ABDOMINAL DISTENTION: 0
DIARRHEA: 0

## 2023-02-05 ASSESSMENT — PAIN DESCRIPTION - ORIENTATION: ORIENTATION: LOWER

## 2023-02-05 NOTE — ED TRIAGE NOTES
Pt reports having pain to right leg and right hip, constipation, and urinary retention. States he has had trickling of urine over the last two days. See urology and has had to have babcock catheter in past. Has chronic hx of pain to right lower back and hip, had back surgery approximately one year ago. States he has not had a bowel movement in seven days and at baseline has BM approximately every three days. Denies noting any blood in stool or urine, fever, or chills.
DISPLAY PLAN FREE TEXT

## 2023-02-05 NOTE — ED PROVIDER NOTES
Emergency Department Provider Note                   PCP:                Bev Watson MD               Age: 68 y.o. Sex: male     No diagnosis found. DISPOSITION         Medical Decision Making  Here with retention  and better after Babcock. Alsoassociated worsening of baseline constipation. Begins having BM after enema bt slow results. No present need for inpatient. Will have follow with urology and message sent by me to them    Amount and/or Complexity of Data Reviewed  Independent Historian: spouse  External Data Reviewed: labs. Labs: ordered. Risk  Prescription drug management. Orders Placed This Encounter   Procedures    Urinalysis w rflx microscopic    Insert babcock catheter    POCT Urine Dipstick    ENEMA MILK AND MOLASSES    [facility]spec[facility]    POCT Urinalysis no Micro        Medications   oxyCODONE-acetaminophen (PERCOCET) 7.5-325 MG per tablet 1 tablet (has no administration in time range)   lidocaine (XYLOCAINE) 2 % uro-jet ( Topical Given 2/5/23 1530)       New Prescriptions    No medications on file        Carol Ann Hsu is a 68 y.o. male who presents to the Emergency Department with chief complaint of    Chief Complaint   Patient presents with    Urinary Retention      Patient in the past has had issues with urinary retention and has at times had to wear a Babcock catheter for over 1 month. He comes in feeling as though he is unable to empty his bladder on this day. He dribbles a scant amount but really does not feel as though he is emptying. Additionally he has had difficulty with bowel movements since this became somewhat worse. He chronically has constipation and states his normal bowel pattern is a bowel movement every 3 or 4 days. Last bowel movement he has had at this point is approximately 7 days ago. He had back surgery over a year ago and has had continued pain to the lower back with radicular pain down his right leg and into his right hip region.   This is not necessarily an acute or new pain but it seems aggravated with other areas of discomfort. Denies any fever or redness or rash. Recalls no trauma or injury to the hip. He has tried MiraLAX and rectal suppositories for his constipation. Denies any present cough or sputum. No flu or COVID symptoms. The history is provided by the patient and the spouse. Review of Systems   Gastrointestinal:  Positive for constipation. Negative for abdominal distention, diarrhea, nausea and vomiting. Genitourinary:  Positive for difficulty urinating. Neurological:         No new focal weakness or numbness and no new cauda equina type symptoms   Psychiatric/Behavioral:  Negative for confusion and decreased concentration. All other systems reviewed and are negative.     Past Medical History:   Diagnosis Date    Axonal polyneuropathy 12/19/2018    B12 deficiency 6/20/2019    BPH (benign prostatic hyperplasia) 8/14/2012    CAD (coronary artery disease) 08/19/2020    Ca score 468    Diverticulitis     Family history of diabetes mellitus     GERD (gastroesophageal reflux disease)     medication    HDL deficiency 8/14/2012    Hemorrhoid     int and ext    Hypertension 8/14/2012    medication    Hypokalemia     Hypothyroid     medication    Leukocytosis     Lumbar radicular pain 6/20/2019    Murmur, cardiac     echo 9/4/20  EF 55-60%    Pain of right hip joint 6/20/2019    Paresthesia of right foot 12/19/2018    Prediabetes     diet control and weight loss  8/13/21 A1c 6.4    Right leg pain 47/82/8924    Systolic murmur 9/4/7650        Past Surgical History:   Procedure Laterality Date    COLONOSCOPY  2020    LUMBAR LAMINECTOMY      ORTHOPEDIC SURGERY      right leg surgery to remove wire at age 12    UPPER GASTROINTESTINAL ENDOSCOPY          Family History   Problem Relation Age of Onset    Cancer Brother 72        lung cancer/lung disease    Cancer Sister         Had Leukemia    Diabetes Mother     Lung Disease Brother COPD    Cancer Sister 76        breast cancer        Social History     Socioeconomic History    Marital status:    Tobacco Use    Smoking status: Former     Packs/day: 0.00     Types: Cigarettes     Quit date: 2000     Years since quittin.1    Smokeless tobacco: Never   Substance and Sexual Activity    Alcohol use: No    Drug use: Not Currently   Social History Narrative    / 2 Children/ father  from muscle disease        Allergies: Patient has no known allergies. Previous Medications    AMLODIPINE (NORVASC) 5 MG TABLET    Take 1 tablet by mouth daily    ASPIRIN 81 MG EC TABLET    Take 81 mg by mouth daily    CYANOCOBALAMIN 1000 MCG TABLET    Take 1,000 mcg by mouth daily    DULOXETINE (CYMBALTA) 30 MG EXTENDED RELEASE CAPSULE    Take 1 capsule by mouth daily    DULOXETINE (CYMBALTA) 60 MG EXTENDED RELEASE CAPSULE    Take 1 capsule by mouth daily    EZETIMIBE (ZETIA) 10 MG TABLET    TAKE ONE TABLET BY MOUTH ONE TIME DAILY    FINASTERIDE (PROSCAR) 5 MG TABLET    Take 5 mg by mouth daily    HYOSCYAMINE SULFATE SL 0.125 MG SUBL    Place 0.125 mg under the tongue every 4 hours as needed    LEVOTHYROXINE (SYNTHROID) 50 MCG TABLET    Take 1 tablet by mouth every morning (before breakfast)    LISINOPRIL-HYDROCHLOROTHIAZIDE (PRINZIDE;ZESTORETIC) 20-12.5 MG PER TABLET    Take 1 tablet by mouth daily    OMEPRAZOLE (PRILOSEC) 40 MG DELAYED RELEASE CAPSULE    Take 1 capsule by mouth daily    POTASSIUM CHLORIDE (KLOR-CON M) 20 MEQ EXTENDED RELEASE TABLET    TAKE ONE TABLET BY MOUTH ONE TIME DAILY    PREGABALIN (LYRICA) 225 MG CAPSULE    Take 225 mg by mouth 2 times daily. PREGABALIN (LYRICA) 225 MG CAPSULE    Take 1 capsule by mouth in the morning and 1 capsule before bedtime. Do all this for 90 days. PREGABALIN (LYRICA) 225 MG CAPSULE    Take 1 capsule by mouth 2 times daily for 90 days.     ROSUVASTATIN (CRESTOR) 20 MG TABLET    Take 1 tablet by mouth daily    TAMSULOSIN (FLOMAX) 0.4 MG CAPSULE    Take 0.4 mg by mouth daily        Vitals signs and nursing note reviewed. Patient Vitals for the past 4 hrs:   Temp Pulse Resp BP SpO2   02/05/23 1500 99.4 °F (37.4 °C) 85 24 (!) 152/103 100 %          Physical Exam  Constitutional:       General: He is not in acute distress. Appearance: He is not toxic-appearing or diaphoretic. Comments: Uncomfortable but not toxic or septic  After Crowley is placed patient is smiling and pleasant though he states there is a burning sensation to his penis as his only significant present complaint     HENT:      Mouth/Throat:      Mouth: Mucous membranes are moist.   Eyes:      General: No scleral icterus. Cardiovascular:      Rate and Rhythm: Normal rate and regular rhythm. Pulses: Normal pulses. Pulmonary:      Effort: Pulmonary effort is normal.      Breath sounds: Normal breath sounds. Abdominal:      General: Bowel sounds are normal.      Palpations: Abdomen is soft. Tenderness: There is no right CVA tenderness, left CVA tenderness, guarding or rebound. Negative signs include Will's sign. Hernia: No hernia is present. Comments: Before Crowley was placed patient has soreness with fullness sensation palpation to the suprapubic region. There are no peritoneal acute findings. No elicited CVA tenderness. Genitourinary:     Comments: Rectal exam is done which shows some slight stool around the rectum externally no active area of bleeding. Area does appear to be somewhat irritated possibly from straining. Digital exam finds fairly large area of firm stool just to the DIP joint region of the digit. This is attempted to be broken up to hopefully allow somewhat easier passage. Musculoskeletal:         General: No tenderness, deformity or signs of injury. Skin:     Coloration: Skin is not jaundiced. Findings: No erythema or rash. Neurological:      General: No focal deficit present. Mental Status: He is alert.  Mental status is at baseline. Psychiatric:         Mood and Affect: Mood normal.         Behavior: Behavior normal.        Procedures        No results found for any visits on 02/05/23. No orders to display                         Voice dictation software was used during the making of this note. This software is not perfect and grammatical and other typographical errors may be present. This note has not been completely proofread for errors.      Micah Navarro MD  02/09/23 5347

## 2023-02-06 VITALS
HEIGHT: 74 IN | RESPIRATION RATE: 18 BRPM | BODY MASS INDEX: 26.31 KG/M2 | SYSTOLIC BLOOD PRESSURE: 108 MMHG | TEMPERATURE: 98.2 F | HEART RATE: 74 BPM | WEIGHT: 205 LBS | OXYGEN SATURATION: 98 % | DIASTOLIC BLOOD PRESSURE: 65 MMHG

## 2023-02-06 LAB
BILIRUB UR QL: NEGATIVE
GLUCOSE UR QL STRIP.AUTO: NEGATIVE MG/DL
KETONES UR-MCNC: NEGATIVE MG/DL
LEUKOCYTE ESTERASE UR QL STRIP: NEGATIVE
NITRITE UR QL: NEGATIVE
PH UR: 7 (ref 5–9)
PROT UR QL: NEGATIVE MG/DL
RBC # UR STRIP: ABNORMAL
SP GR UR: 1.01 (ref 1–1.02)
UROBILINOGEN UR QL: 0.2 EU/DL (ref 0.2–1)

## 2023-02-06 NOTE — DISCHARGE INSTRUCTIONS
Referral for follow-up with urology has been sent  No evidence of acute urinary tract infection  Continue MiraLAX for constipation issues until situation resolves  Return any point with significant or worsening problems, loss of bowel or bladder control that is new to, other concern  Leg bag will be sent home with you, may use this when popping about during the day

## 2023-02-07 ENCOUNTER — OFFICE VISIT (OUTPATIENT)
Dept: UROLOGY | Age: 78
End: 2023-02-07
Payer: MEDICARE

## 2023-02-07 DIAGNOSIS — R33.8 BENIGN PROSTATIC HYPERPLASIA WITH URINARY RETENTION: ICD-10-CM

## 2023-02-07 DIAGNOSIS — N40.1 BENIGN PROSTATIC HYPERPLASIA WITH URINARY RETENTION: ICD-10-CM

## 2023-02-07 DIAGNOSIS — K59.00 CONSTIPATION, UNSPECIFIED CONSTIPATION TYPE: ICD-10-CM

## 2023-02-07 DIAGNOSIS — R33.9 RETENTION OF URINE, UNSPECIFIED: Primary | ICD-10-CM

## 2023-02-07 PROCEDURE — 99214 OFFICE O/P EST MOD 30 MIN: CPT | Performed by: NURSE PRACTITIONER

## 2023-02-07 PROCEDURE — G8427 DOCREV CUR MEDS BY ELIG CLIN: HCPCS | Performed by: NURSE PRACTITIONER

## 2023-02-07 PROCEDURE — 1123F ACP DISCUSS/DSCN MKR DOCD: CPT | Performed by: NURSE PRACTITIONER

## 2023-02-07 PROCEDURE — G8484 FLU IMMUNIZE NO ADMIN: HCPCS | Performed by: NURSE PRACTITIONER

## 2023-02-07 PROCEDURE — G8417 CALC BMI ABV UP PARAM F/U: HCPCS | Performed by: NURSE PRACTITIONER

## 2023-02-07 PROCEDURE — 1036F TOBACCO NON-USER: CPT | Performed by: NURSE PRACTITIONER

## 2023-02-07 ASSESSMENT — ENCOUNTER SYMPTOMS
EYES NEGATIVE: 1
RESPIRATORY NEGATIVE: 1

## 2023-02-07 NOTE — PROGRESS NOTES
[facility]  [address]    [address], [address]  [phone]          Sal Cavazostery  : [birth year]    Chief Complaint   Patient presents with    Other     Cath           HPI     Sal Ariza is a 68 y.o. male with a PMH of BPH/Urinary retention s/p urolift 22 who presents for ER ollow up today. Last seen 22 and he reported voiding well on his own after surgery. He is off of flomax and finasteride since surgery and doing well. Only issue is he reported NO urge to urinate and often will go 6-8 hours without voiding. No hematuria. No UTis. No urinary incontinence. Presented to ER on 22 w c/o urinary retention and constipation. No BM x 7 days. MOM enema adm. Catheter inserted and pt discharged home. Here today requesting catheter removal. Constipation resolved. Catheter draining clear yellow urine. Afebrile.       Lab Results   Component Value Date    PSA 2.5 2022    PSA 1.5 2021    PSA 1.6 2020         Past Medical History:   Diagnosis Date    Axonal polyneuropathy 2018    B12 deficiency 2019    BPH (benign prostatic hyperplasia) 2012    CAD (coronary artery disease) 2020    Ca score 468    Diverticulitis     Family history of diabetes mellitus     GERD (gastroesophageal reflux disease)     medication    HDL deficiency 2012    Hemorrhoid     int and ext    Hypertension 2012    medication    Hypokalemia     Hypothyroid     medication    Leukocytosis     Lumbar radicular pain 2019    Murmur, cardiac     echo 20  EF 55-60%    Pain of right hip joint 2019    Paresthesia of right foot 2018    Prediabetes     diet control and weight loss  21 A1c 6.4    Right leg pain     Systolic murmur      Past Surgical History:   Procedure Laterality Date    COLONOSCOPY      LUMBAR LAMINECTOMY      ORTHOPEDIC SURGERY      right leg surgery to remove wire at age 16    UPPER GASTROINTESTINAL ENDOSCOPY       Current Outpatient Medications   Medication Sig Dispense Refill    ezetimibe (ZETIA) 10 MG tablet TAKE ONE TABLET BY MOUTH ONE TIME DAILY 90 tablet 1    potassium chloride (KLOR-CON M) 20 MEQ extended release tablet TAKE ONE TABLET BY MOUTH ONE TIME DAILY 90 tablet 1    amLODIPine (NORVASC) 5 MG tablet Take 1 tablet by mouth daily 90 tablet 1    levothyroxine (SYNTHROID) 50 MCG tablet Take 1 tablet by mouth every morning (before breakfast) 90 tablet 1    lisinopril-hydroCHLOROthiazide (PRINZIDE;ZESTORETIC) 20-12.5 MG per tablet Take 1 tablet by mouth daily 90 tablet 1    omeprazole (PRILOSEC) 40 MG delayed release capsule Take 1 capsule by mouth daily 90 capsule 1    rosuvastatin (CRESTOR) 20 MG tablet Take 1 tablet by mouth daily 90 tablet 1    DULoxetine (CYMBALTA) 30 MG extended release capsule Take 1 capsule by mouth daily 90 capsule 1    aspirin 81 MG EC tablet Take 81 mg by mouth daily      cyanocobalamin 1000 MCG tablet Take 1,000 mcg by mouth daily      Hyoscyamine Sulfate SL 0.125 MG SUBL Place 0.125 mg under the tongue every 4 hours as needed      pregabalin (LYRICA) 225 MG capsule Take 225 mg by mouth 2 times daily. DULoxetine (CYMBALTA) 60 MG extended release capsule Take 1 capsule by mouth daily 30 capsule 3    pregabalin (LYRICA) 225 MG capsule Take 1 capsule by mouth 2 times daily for 90 days. 180 capsule 3    pregabalin (LYRICA) 225 MG capsule Take 1 capsule by mouth in the morning and 1 capsule before bedtime. Do all this for 90 days. 60 capsule 2    finasteride (PROSCAR) 5 MG tablet Take 5 mg by mouth daily      tamsulosin (FLOMAX) 0.4 MG capsule Take 0.4 mg by mouth daily (Patient not taking: No sig reported)       No current facility-administered medications for this visit.      No Known Allergies  Social History     Socioeconomic History    Marital status:      Spouse name: Not on file    Number of children: Not on file    Years of education: Not on file    Highest education level: Not on file   Occupational History    Not on file   Tobacco Use    Smoking status: Former     Packs/day: 0.00     Types: Cigarettes     Quit date: 2000     Years since quittin.1    Smokeless tobacco: Never   Substance and Sexual Activity    Alcohol use: No    Drug use: Not Currently    Sexual activity: Not on file   Other Topics Concern    Not on file   Social History Narrative    / 2 Children/ father  from muscle disease     Social Determinants of Health     Financial Resource Strain: Not on file   Food Insecurity: Not on file   Transportation Needs: Not on file   Physical Activity: Not on file   Stress: Not on file   Social Connections: Not on file   Intimate Partner Violence: Not on file   Housing Stability: Not on file     Family History   Problem Relation Age of Onset    Cancer Brother 72        lung cancer/lung disease    Cancer Sister         Had Leukemia    Diabetes Mother     Lung Disease Brother         COPD    Cancer Sister 76        breast cancer       Review of Systems  Constitutional: Negative  Skin: Negative skin ROS  Eyes: Eyes negative  ENT: HENT negative  Respiratory: Respiratory negative  Cardiovascular: Neg cardio ROS  GI: Neg GI ROS  Genitourinary: Genitourinary negative  Musculoskeletal: Musculoskeletal negative  Psychological: Neg psych ROS  Endocrine: Endocrine negative  Hem/Lymphatic: Hematologic/lymphatic negative    PHYSICAL EXAM    General appearance - well appearing and in no distress  Mental status - alert, oriented to person, place, and time  Neck - supple, no significant adenopathy  Chest/Lung-  Quiet, even and easy respiratory effort without use of accessory muscles  Skin - normal coloration and turgor, no rashes  Indwelling babcock catheter    Assessment and Plan    ICD-10-CM    1. Retention of urine, unspecified  R33.9       2. Benign prostatic hyperplasia with urinary retention  N40.1     R33.8       3. Constipation, unspecified constipation type  K59.00       VT today. Patient placed in supine position. Crowley catheter balloon deflated. Catheter removed intact and w/o difficulty. Patient tolerated well. Push fluids. He is instructed to return to office today by 4 pm if unable to void. Consider restarting flomax w continued retention. Advised to avoid bladder irritants. Advised to start daily colace for prevention. ROV in 2 weeks if voiding well. To call sooner if needed. Brennan Waite is supervising physician today and he approves plan of care.

## 2023-02-20 DIAGNOSIS — I25.84 CORONARY ARTERY DISEASE DUE TO CALCIFIED CORONARY LESION: ICD-10-CM

## 2023-02-20 DIAGNOSIS — E03.4 HYPOTHYROIDISM DUE TO ACQUIRED ATROPHY OF THYROID: ICD-10-CM

## 2023-02-20 DIAGNOSIS — I25.10 CORONARY ARTERY DISEASE DUE TO CALCIFIED CORONARY LESION: ICD-10-CM

## 2023-02-20 DIAGNOSIS — I10 ESSENTIAL HYPERTENSION: ICD-10-CM

## 2023-02-20 RX ORDER — LEVOTHYROXINE SODIUM 0.05 MG/1
TABLET ORAL
Qty: 30 TABLET | Refills: 0 | Status: SHIPPED | OUTPATIENT
Start: 2023-02-20

## 2023-02-20 RX ORDER — LISINOPRIL AND HYDROCHLOROTHIAZIDE 20; 12.5 MG/1; MG/1
TABLET ORAL
Qty: 30 TABLET | Refills: 0 | Status: SHIPPED | OUTPATIENT
Start: 2023-02-20

## 2023-02-21 ENCOUNTER — OFFICE VISIT (OUTPATIENT)
Dept: UROLOGY | Age: 78
End: 2023-02-21
Payer: MEDICARE

## 2023-02-21 DIAGNOSIS — N40.1 BENIGN PROSTATIC HYPERPLASIA WITH URINARY RETENTION: Primary | ICD-10-CM

## 2023-02-21 DIAGNOSIS — R35.1 NOCTURIA: ICD-10-CM

## 2023-02-21 DIAGNOSIS — R33.8 BENIGN PROSTATIC HYPERPLASIA WITH URINARY RETENTION: Primary | ICD-10-CM

## 2023-02-21 LAB — PVR, POC: ABNORMAL CC

## 2023-02-21 PROCEDURE — G8417 CALC BMI ABV UP PARAM F/U: HCPCS | Performed by: NURSE PRACTITIONER

## 2023-02-21 PROCEDURE — 99214 OFFICE O/P EST MOD 30 MIN: CPT | Performed by: NURSE PRACTITIONER

## 2023-02-21 PROCEDURE — 51798 US URINE CAPACITY MEASURE: CPT | Performed by: NURSE PRACTITIONER

## 2023-02-21 PROCEDURE — 1036F TOBACCO NON-USER: CPT | Performed by: NURSE PRACTITIONER

## 2023-02-21 PROCEDURE — G8484 FLU IMMUNIZE NO ADMIN: HCPCS | Performed by: NURSE PRACTITIONER

## 2023-02-21 PROCEDURE — G8427 DOCREV CUR MEDS BY ELIG CLIN: HCPCS | Performed by: NURSE PRACTITIONER

## 2023-02-21 PROCEDURE — 1123F ACP DISCUSS/DSCN MKR DOCD: CPT | Performed by: NURSE PRACTITIONER

## 2023-02-21 ASSESSMENT — ENCOUNTER SYMPTOMS
RESPIRATORY NEGATIVE: 1
EYES NEGATIVE: 1

## 2023-02-21 NOTE — PROGRESS NOTES
[facility] Urology  [address] [facility]    [address] [facility], [address] [facility]  [phone]          Kalyani Colin  : [birth year]    Chief Complaint   Patient presents with    Follow-up    Urinary Retention          HPI     Kalyani Colin is a 68 y.o. male with a PMH of BPH/Urinary retention s/p urolift 22 who presents for fu. Last seen 22 and he reported voiding well on his own after surgery. He is off of flomax and finasteride since surgery and doing well. Only issue is he reported NO urge to urinate and often will go 6-8 hours without voiding. No hematuria. No UTis. No urinary incontinence. Presented to ER on 22 w c/o urinary retention and constipation. No BM x 7 days. MOM enema adm. Catheter inserted and pt discharged home. VT passed on 23.  cc today. Very mild LUTS. Nocturia x 3-4. Does not urinate much during the day. He is taking daily stool softener which has greatly improved constipation. Lab Results   Component Value Date     PSA 2.5 2022     PSA 1.5 2021     PSA 1.6 2020      Could not give a voided urine sample today.            Past Medical History:   Diagnosis Date    Axonal polyneuropathy 2018    B12 deficiency 2019    BPH (benign prostatic hyperplasia) 2012    CAD (coronary artery disease) 2020    Ca score 468    Diverticulitis     Family history of diabetes mellitus     GERD (gastroesophageal reflux disease)     medication    HDL deficiency 2012    Hemorrhoid     int and ext    Hypertension 2012    medication    Hypokalemia     Hypothyroid     medication    Leukocytosis     Lumbar radicular pain 2019    Murmur, cardiac     echo 20  EF 55-60%    Pain of right hip joint 2019    Paresthesia of right foot 2018    Prediabetes     diet control and weight loss  21 A1c 6.4    Right leg pain     Systolic murmur      Past Surgical History: Procedure Laterality Date    COLONOSCOPY  2020    LUMBAR LAMINECTOMY      ORTHOPEDIC SURGERY      right leg surgery to remove wire at age 12    UPPER GASTROINTESTINAL ENDOSCOPY       Current Outpatient Medications   Medication Sig Dispense Refill    levothyroxine (SYNTHROID) 50 MCG tablet TAKE ONE TABLET BY MOUTH EVERY MORNING BEFORE BREAKFAST 30 tablet 0    lisinopril-hydroCHLOROthiazide (PRINZIDE;ZESTORETIC) 20-12.5 MG per tablet TAKE ONE TABLET BY MOUTH ONE TIME DAILY 30 tablet 0    ezetimibe (ZETIA) 10 MG tablet TAKE ONE TABLET BY MOUTH ONE TIME DAILY 90 tablet 1    potassium chloride (KLOR-CON M) 20 MEQ extended release tablet TAKE ONE TABLET BY MOUTH ONE TIME DAILY 90 tablet 1    amLODIPine (NORVASC) 5 MG tablet Take 1 tablet by mouth daily 90 tablet 1    omeprazole (PRILOSEC) 40 MG delayed release capsule Take 1 capsule by mouth daily 90 capsule 1    rosuvastatin (CRESTOR) 20 MG tablet Take 1 tablet by mouth daily 90 tablet 1    DULoxetine (CYMBALTA) 30 MG extended release capsule Take 1 capsule by mouth daily 90 capsule 1    aspirin 81 MG EC tablet Take 81 mg by mouth daily      cyanocobalamin 1000 MCG tablet Take 1,000 mcg by mouth daily      Hyoscyamine Sulfate SL 0.125 MG SUBL Place 0.125 mg under the tongue every 4 hours as needed      pregabalin (LYRICA) 225 MG capsule Take 225 mg by mouth 2 times daily. tamsulosin (FLOMAX) 0.4 MG capsule Take 0.4 mg by mouth daily      DULoxetine (CYMBALTA) 60 MG extended release capsule Take 1 capsule by mouth daily 30 capsule 3    pregabalin (LYRICA) 225 MG capsule Take 1 capsule by mouth 2 times daily for 90 days. 180 capsule 3    pregabalin (LYRICA) 225 MG capsule Take 1 capsule by mouth in the morning and 1 capsule before bedtime. Do all this for 90 days. 60 capsule 2    finasteride (PROSCAR) 5 MG tablet Take 5 mg by mouth daily       No current facility-administered medications for this visit.      No Known Allergies  Social History     Socioeconomic History    Marital status:      Spouse name: Not on file    Number of children: Not on file    Years of education: Not on file    Highest education level: Not on file   Occupational History    Not on file   Tobacco Use    Smoking status: Former     Packs/day: 0.00     Types: Cigarettes     Quit date: 2000     Years since quittin.1    Smokeless tobacco: Never   Substance and Sexual Activity    Alcohol use: No    Drug use: Not Currently    Sexual activity: Not on file   Other Topics Concern    Not on file   Social History Narrative    / 2 Children/ father  from muscle disease     Social Determinants of Health     Financial Resource Strain: Not on file   Food Insecurity: Not on file   Transportation Needs: Not on file   Physical Activity: Not on file   Stress: Not on file   Social Connections: Not on file   Intimate Partner Violence: Not on file   Housing Stability: Not on file     Family History   Problem Relation Age of Onset    Cancer Brother 72        lung cancer/lung disease    Cancer Sister         Had Leukemia    Diabetes Mother     Lung Disease Brother         COPD    Cancer Sister 76        breast cancer       Review of Systems  Constitutional: Negative  Skin: Negative skin ROS  Eyes: Eyes negative  ENT: HENT negative  Respiratory: Respiratory negative  Cardiovascular: Neg cardio ROS  GI: Neg GI ROS  Genitourinary: Genitourinary negative  Musculoskeletal: Musculoskeletal negative  Psychological: Neg psych ROS  Endocrine: Endocrine negative  Hem/Lymphatic: Hematologic/lymphatic negative    Urinalysis  UA - Dipstick  Results for orders placed or performed in visit on 22   AMB POC URINALYSIS DIP STICK AUTO W/O MICRO   Result Value Ref Range    Color (UA POC)      Clarity (UA POC)      Glucose, Urine, POC Negative Negative    Bilirubin, Urine, POC Negative Negative    KETONES, Urine, POC Negative Negative    Specific Gravity, Urine, POC 1.020 1.001 - 1.035    Blood (UA POC) Trace-intact Negative    pH, Urine, POC 7.0 4.6 - 8.0    Protein, Urine, POC Negative Negative    Urobilinogen, POC 0.2 mg/dL     Nitrite, Urine, POC Negative Negative    Leukocyte Esterase, Urine, POC Negative Negative       PHYSICAL EXAM    General appearance - well appearing and in no distress  Mental status - alert, oriented to person, place, and time  Neck - supple, no significant adenopathy  Chest/Lung-  Quiet, even and easy respiratory effort without use of accessory muscles  Skin - normal coloration and turgor, no rashes        Assessment and Plan    ICD-10-CM    1. Benign prostatic hyperplasia with urinary retention  N40.1 AMB POC PVR, SAROJ,POST-VOID RES,US,NON-IMAGING    R33.8 CANCELED: AMB POC URINALYSIS DIP STICK AUTO W/O MICRO      2. Nocturia  R35.1       Voiding well wo medication. Advised to try timed voiding schedule throughout the day to reduce nocturia. Also advised to avoid bladder irritants. Consider restarting flomax if sx worsen. ROV in 1 yr. To call sooner if needed. Tracy West is supervising physician today and he approves plan of care.

## 2023-03-20 ENCOUNTER — OFFICE VISIT (OUTPATIENT)
Dept: INTERNAL MEDICINE CLINIC | Facility: CLINIC | Age: 78
End: 2023-03-20
Payer: MEDICARE

## 2023-03-20 VITALS
DIASTOLIC BLOOD PRESSURE: 71 MMHG | BODY MASS INDEX: 27.08 KG/M2 | HEIGHT: 74 IN | SYSTOLIC BLOOD PRESSURE: 138 MMHG | TEMPERATURE: 97.1 F | OXYGEN SATURATION: 98 % | HEART RATE: 62 BPM | WEIGHT: 211 LBS

## 2023-03-20 DIAGNOSIS — R73.03 PREDIABETES: ICD-10-CM

## 2023-03-20 DIAGNOSIS — E03.4 HYPOTHYROIDISM DUE TO ACQUIRED ATROPHY OF THYROID: ICD-10-CM

## 2023-03-20 DIAGNOSIS — I25.84 CORONARY ARTERY DISEASE DUE TO CALCIFIED CORONARY LESION: ICD-10-CM

## 2023-03-20 DIAGNOSIS — G89.29 CHRONIC BILATERAL LOW BACK PAIN WITHOUT SCIATICA: ICD-10-CM

## 2023-03-20 DIAGNOSIS — E87.6 HYPOKALEMIA: ICD-10-CM

## 2023-03-20 DIAGNOSIS — I25.84 CORONARY ARTERY DISEASE DUE TO CALCIFIED CORONARY LESION: Primary | ICD-10-CM

## 2023-03-20 DIAGNOSIS — M54.50 CHRONIC BILATERAL LOW BACK PAIN WITHOUT SCIATICA: ICD-10-CM

## 2023-03-20 DIAGNOSIS — K21.9 GASTROESOPHAGEAL REFLUX DISEASE WITHOUT ESOPHAGITIS: ICD-10-CM

## 2023-03-20 DIAGNOSIS — I10 ESSENTIAL HYPERTENSION: ICD-10-CM

## 2023-03-20 DIAGNOSIS — I77.89 ENLARGED THORACIC AORTA (HCC): ICD-10-CM

## 2023-03-20 DIAGNOSIS — I25.10 CORONARY ARTERY DISEASE DUE TO CALCIFIED CORONARY LESION: ICD-10-CM

## 2023-03-20 DIAGNOSIS — E78.2 MIXED HYPERLIPIDEMIA: ICD-10-CM

## 2023-03-20 DIAGNOSIS — I25.10 CORONARY ARTERY DISEASE DUE TO CALCIFIED CORONARY LESION: Primary | ICD-10-CM

## 2023-03-20 LAB
ALBUMIN SERPL-MCNC: 4.1 G/DL (ref 3.2–4.6)
ALBUMIN/GLOB SERPL: 1.4 (ref 0.4–1.6)
ALP SERPL-CCNC: 59 U/L (ref 50–136)
ALT SERPL-CCNC: 27 U/L (ref 12–65)
ANION GAP SERPL CALC-SCNC: 3 MMOL/L (ref 2–11)
AST SERPL-CCNC: 21 U/L (ref 15–37)
BASOPHILS # BLD: 0.1 K/UL (ref 0–0.2)
BASOPHILS NFR BLD: 1 % (ref 0–2)
BILIRUB DIRECT SERPL-MCNC: 0.2 MG/DL
BILIRUB SERPL-MCNC: 0.4 MG/DL (ref 0.2–1.1)
BUN SERPL-MCNC: 15 MG/DL (ref 8–23)
CALCIUM SERPL-MCNC: 9.1 MG/DL (ref 8.3–10.4)
CHLORIDE SERPL-SCNC: 101 MMOL/L (ref 101–110)
CO2 SERPL-SCNC: 31 MMOL/L (ref 21–32)
CREAT SERPL-MCNC: 1 MG/DL (ref 0.8–1.5)
DIFFERENTIAL METHOD BLD: NORMAL
EOSINOPHIL # BLD: 0.1 K/UL (ref 0–0.8)
EOSINOPHIL NFR BLD: 2 % (ref 0.5–7.8)
ERYTHROCYTE [DISTWIDTH] IN BLOOD BY AUTOMATED COUNT: 13 % (ref 11.9–14.6)
EST. AVERAGE GLUCOSE BLD GHB EST-MCNC: 120 MG/DL
GLOBULIN SER CALC-MCNC: 2.9 G/DL (ref 2.8–4.5)
GLUCOSE SERPL-MCNC: 127 MG/DL (ref 65–100)
HBA1C MFR BLD: 5.8 % (ref 4.8–5.6)
HCT VFR BLD AUTO: 41.4 % (ref 41.1–50.3)
HGB BLD-MCNC: 13.8 G/DL (ref 13.6–17.2)
IMM GRANULOCYTES # BLD AUTO: 0 K/UL (ref 0–0.5)
IMM GRANULOCYTES NFR BLD AUTO: 0 % (ref 0–5)
LYMPHOCYTES # BLD: 2.5 K/UL (ref 0.5–4.6)
LYMPHOCYTES NFR BLD: 31 % (ref 13–44)
MCH RBC QN AUTO: 29.9 PG (ref 26.1–32.9)
MCHC RBC AUTO-ENTMCNC: 33.3 G/DL (ref 31.4–35)
MCV RBC AUTO: 89.8 FL (ref 82–102)
MONOCYTES # BLD: 1 K/UL (ref 0.1–1.3)
MONOCYTES NFR BLD: 12 % (ref 4–12)
NEUTS SEG # BLD: 4.3 K/UL (ref 1.7–8.2)
NEUTS SEG NFR BLD: 54 % (ref 43–78)
NRBC # BLD: 0 K/UL (ref 0–0.2)
PLATELET # BLD AUTO: 324 K/UL (ref 150–450)
PMV BLD AUTO: 9.8 FL (ref 9.4–12.3)
POTASSIUM SERPL-SCNC: 4.4 MMOL/L (ref 3.5–5.1)
PROT SERPL-MCNC: 7 G/DL (ref 6.3–8.2)
RBC # BLD AUTO: 4.61 M/UL (ref 4.23–5.6)
SODIUM SERPL-SCNC: 135 MMOL/L (ref 133–143)
TSH, 3RD GENERATION: 3.59 UIU/ML (ref 0.36–3.74)
WBC # BLD AUTO: 8.1 K/UL (ref 4.3–11.1)

## 2023-03-20 PROCEDURE — 1036F TOBACCO NON-USER: CPT | Performed by: INTERNAL MEDICINE

## 2023-03-20 PROCEDURE — 3075F SYST BP GE 130 - 139MM HG: CPT | Performed by: INTERNAL MEDICINE

## 2023-03-20 PROCEDURE — 99214 OFFICE O/P EST MOD 30 MIN: CPT | Performed by: INTERNAL MEDICINE

## 2023-03-20 PROCEDURE — G8417 CALC BMI ABV UP PARAM F/U: HCPCS | Performed by: INTERNAL MEDICINE

## 2023-03-20 PROCEDURE — G8484 FLU IMMUNIZE NO ADMIN: HCPCS | Performed by: INTERNAL MEDICINE

## 2023-03-20 PROCEDURE — 3078F DIAST BP <80 MM HG: CPT | Performed by: INTERNAL MEDICINE

## 2023-03-20 PROCEDURE — G8427 DOCREV CUR MEDS BY ELIG CLIN: HCPCS | Performed by: INTERNAL MEDICINE

## 2023-03-20 PROCEDURE — 1123F ACP DISCUSS/DSCN MKR DOCD: CPT | Performed by: INTERNAL MEDICINE

## 2023-03-20 RX ORDER — OMEPRAZOLE 40 MG/1
40 CAPSULE, DELAYED RELEASE ORAL DAILY
Qty: 90 CAPSULE | Refills: 1 | Status: SHIPPED | OUTPATIENT
Start: 2023-03-20

## 2023-03-20 RX ORDER — ROSUVASTATIN CALCIUM 20 MG/1
20 TABLET, COATED ORAL DAILY
Qty: 90 TABLET | Refills: 1 | Status: SHIPPED | OUTPATIENT
Start: 2023-03-20

## 2023-03-20 RX ORDER — DULOXETIN HYDROCHLORIDE 60 MG/1
60 CAPSULE, DELAYED RELEASE ORAL DAILY
Qty: 90 CAPSULE | Refills: 1 | Status: SHIPPED | OUTPATIENT
Start: 2023-03-20 | End: 2023-06-18

## 2023-03-20 RX ORDER — POTASSIUM CHLORIDE 20 MEQ/1
20 TABLET, EXTENDED RELEASE ORAL DAILY
Qty: 90 TABLET | Refills: 1 | Status: SHIPPED | OUTPATIENT
Start: 2023-03-20

## 2023-03-20 RX ORDER — LISINOPRIL AND HYDROCHLOROTHIAZIDE 20; 12.5 MG/1; MG/1
TABLET ORAL
Qty: 90 TABLET | Refills: 1 | Status: SHIPPED | OUTPATIENT
Start: 2023-03-20

## 2023-03-20 RX ORDER — AMLODIPINE BESYLATE 5 MG/1
5 TABLET ORAL DAILY
Qty: 90 TABLET | Refills: 1 | Status: SHIPPED | OUTPATIENT
Start: 2023-03-20

## 2023-03-20 RX ORDER — LEVOTHYROXINE SODIUM 0.05 MG/1
TABLET ORAL
Qty: 90 TABLET | Refills: 1 | Status: SHIPPED | OUTPATIENT
Start: 2023-03-20

## 2023-03-20 RX ORDER — EZETIMIBE 10 MG/1
10 TABLET ORAL DAILY
Qty: 90 TABLET | Refills: 1 | Status: SHIPPED | OUTPATIENT
Start: 2023-03-20

## 2023-03-20 RX ORDER — DULOXETIN HYDROCHLORIDE 30 MG/1
30 CAPSULE, DELAYED RELEASE ORAL DAILY
Qty: 90 CAPSULE | Refills: 1 | Status: CANCELLED | OUTPATIENT
Start: 2023-03-20

## 2023-03-20 SDOH — ECONOMIC STABILITY: FOOD INSECURITY: WITHIN THE PAST 12 MONTHS, THE FOOD YOU BOUGHT JUST DIDN'T LAST AND YOU DIDN'T HAVE MONEY TO GET MORE.: NEVER TRUE

## 2023-03-20 SDOH — ECONOMIC STABILITY: HOUSING INSECURITY
IN THE LAST 12 MONTHS, WAS THERE A TIME WHEN YOU DID NOT HAVE A STEADY PLACE TO SLEEP OR SLEPT IN A SHELTER (INCLUDING NOW)?: NO

## 2023-03-20 SDOH — ECONOMIC STABILITY: INCOME INSECURITY: HOW HARD IS IT FOR YOU TO PAY FOR THE VERY BASICS LIKE FOOD, HOUSING, MEDICAL CARE, AND HEATING?: NOT HARD AT ALL

## 2023-03-20 SDOH — ECONOMIC STABILITY: FOOD INSECURITY: WITHIN THE PAST 12 MONTHS, YOU WORRIED THAT YOUR FOOD WOULD RUN OUT BEFORE YOU GOT MONEY TO BUY MORE.: NEVER TRUE

## 2023-03-20 ASSESSMENT — ANXIETY QUESTIONNAIRES
GAD7 TOTAL SCORE: 0
1. FEELING NERVOUS, ANXIOUS, OR ON EDGE: 0
5. BEING SO RESTLESS THAT IT IS HARD TO SIT STILL: 0
4. TROUBLE RELAXING: 0
3. WORRYING TOO MUCH ABOUT DIFFERENT THINGS: 0
2. NOT BEING ABLE TO STOP OR CONTROL WORRYING: 0
IF YOU CHECKED OFF ANY PROBLEMS ON THIS QUESTIONNAIRE, HOW DIFFICULT HAVE THESE PROBLEMS MADE IT FOR YOU TO DO YOUR WORK, TAKE CARE OF THINGS AT HOME, OR GET ALONG WITH OTHER PEOPLE: NOT DIFFICULT AT ALL
7. FEELING AFRAID AS IF SOMETHING AWFUL MIGHT HAPPEN: 0
6. BECOMING EASILY ANNOYED OR IRRITABLE: 0

## 2023-03-20 ASSESSMENT — PATIENT HEALTH QUESTIONNAIRE - PHQ9
SUM OF ALL RESPONSES TO PHQ QUESTIONS 1-9: 0
SUM OF ALL RESPONSES TO PHQ9 QUESTIONS 1 & 2: 0
SUM OF ALL RESPONSES TO PHQ QUESTIONS 1-9: 0
1. LITTLE INTEREST OR PLEASURE IN DOING THINGS: 0
SUM OF ALL RESPONSES TO PHQ QUESTIONS 1-9: 0
SUM OF ALL RESPONSES TO PHQ QUESTIONS 1-9: 0
2. FEELING DOWN, DEPRESSED OR HOPELESS: 0

## 2023-03-20 ASSESSMENT — ENCOUNTER SYMPTOMS
BLOOD IN STOOL: 0
SHORTNESS OF BREATH: 0

## 2023-03-20 NOTE — PROGRESS NOTES
tablet Take 81 mg by mouth daily      cyanocobalamin 1000 MCG tablet Take 1,000 mcg by mouth daily      finasteride (PROSCAR) 5 MG tablet Take 5 mg by mouth daily      Hyoscyamine Sulfate SL 0.125 MG SUBL Place 0.125 mg under the tongue every 4 hours as needed      pregabalin (LYRICA) 225 MG capsule Take 225 mg by mouth 2 times daily. tamsulosin (FLOMAX) 0.4 MG capsule Take 0.4 mg by mouth daily       No current facility-administered medications for this visit.      No Known Allergies    Past Medical History:   Diagnosis Date    Axonal polyneuropathy 2018    B12 deficiency 2019    BPH (benign prostatic hyperplasia) 2012    CAD (coronary artery disease) 2020    Ca score 468    Diverticulitis     Family history of diabetes mellitus     GERD (gastroesophageal reflux disease)     medication    HDL deficiency 2012    Hemorrhoid     int and ext    Hypertension 2012    medication    Hypokalemia     Hypothyroid     medication    Leukocytosis     Lumbar radicular pain 2019    Murmur, cardiac     echo 20  EF 55-60%    Pain of right hip joint 2019    Paresthesia of right foot 2018    Prediabetes     diet control and weight loss  21 A1c 6.4    Right leg pain     Systolic murmur 9414     Past Surgical History:   Procedure Laterality Date    COLONOSCOPY      LUMBAR LAMINECTOMY      ORTHOPEDIC SURGERY      right leg surgery to remove wire at age 12    UPPER GASTROINTESTINAL ENDOSCOPY       Social History     Tobacco Use    Smoking status: Former     Packs/day: 0.00     Types: Cigarettes     Quit date: 2000     Years since quittin.2    Smokeless tobacco: Never   Substance Use Topics    Alcohol use: No    Drug use: Not Currently     Family History   Problem Relation Age of Onset    Cancer Brother 72        lung cancer/lung disease    Cancer Sister         Had Leukemia    Diabetes Mother     Lung Disease Brother         COPD    Cancer Sister 76

## 2023-06-07 ENCOUNTER — PATIENT MESSAGE (OUTPATIENT)
Dept: ORTHOPEDIC SURGERY | Age: 78
End: 2023-06-07

## 2023-06-07 DIAGNOSIS — M54.16 RADICULOPATHY, LUMBAR REGION: Primary | ICD-10-CM

## 2023-06-08 RX ORDER — PREGABALIN 225 MG/1
225 CAPSULE ORAL 2 TIMES DAILY
Qty: 180 CAPSULE | Refills: 0 | Status: SHIPPED | OUTPATIENT
Start: 2023-06-08 | End: 2023-09-06

## 2023-06-08 NOTE — TELEPHONE ENCOUNTER
From: Olman Herrera  To: Dr. Tomlinson Prom: 6/7/2023 5:19 PM EDT  Subject: Prescription refill    Pregabalin 225 MG SAP   twice a day RX #[id number] [facility] on [address]

## 2023-06-09 ENCOUNTER — TELEPHONE (OUTPATIENT)
Dept: ORTHOPEDIC SURGERY | Age: 78
End: 2023-06-09

## 2023-06-09 RX ORDER — PREGABALIN 75 MG/1
75 CAPSULE ORAL 2 TIMES DAILY
Qty: 180 CAPSULE | Refills: 3 | Status: CANCELLED | OUTPATIENT
Start: 2023-06-09 | End: 2023-07-08

## 2023-06-19 ENCOUNTER — OFFICE VISIT (OUTPATIENT)
Age: 78
End: 2023-06-19

## 2023-06-19 VITALS
HEART RATE: 62 BPM | WEIGHT: 208.6 LBS | BODY MASS INDEX: 26.78 KG/M2 | DIASTOLIC BLOOD PRESSURE: 70 MMHG | SYSTOLIC BLOOD PRESSURE: 130 MMHG

## 2023-06-19 DIAGNOSIS — I25.84 CORONARY ARTERY DISEASE DUE TO CALCIFIED CORONARY LESION: Primary | ICD-10-CM

## 2023-06-19 DIAGNOSIS — I25.10 CORONARY ARTERY DISEASE DUE TO CALCIFIED CORONARY LESION: Primary | ICD-10-CM

## 2023-06-19 RX ORDER — FINASTERIDE 5 MG/1
5 TABLET, FILM COATED ORAL DAILY
COMMUNITY

## 2023-06-19 NOTE — PROGRESS NOTES
[facility] CARDIOLOGY  [address] [facility], [address] [facility], [address] [place] [address]  PHONE: [phone]      23    NAME:  Andriy Redmond  :   MRN: [id number]         SUBJECTIVE:   Andriy Redmond is a 66 y.o. male seen for a follow up visit regarding the following:     Chief Complaint   Patient presents with    Coronary Artery Disease            HPI:  Follow up  Coronary Artery Disease   . Mr. Mike Ledezma presents today for follow-up. Patient complains of worsening breathlessness with activity. States he had back surgery about 18 months ago, since then has been less mobile because of discomfort. He wonders whether some of his breathlessness could be related to his deconditioning. He does have some chest pressure with associated shortness of breath. Overall he feels like it is worsening. Patient had a abnormal CT calcium score 3 years ago, underwent stress test at that time which showed no evidence of ischemia. Coronary Artery Disease          Key CAD CHF Meds            amLODIPine (NORVASC) 5 MG tablet (Taking)    Sig - Route: Take 1 tablet by mouth daily - Oral    ezetimibe (ZETIA) 10 MG tablet (Taking)    Sig - Route: Take 1 tablet by mouth daily - Oral    lisinopril-hydroCHLOROthiazide (PRINZIDE;ZESTORETIC) 20-12.5 MG per tablet (Taking)    Sig: TAKE ONE TABLET BY MOUTH ONE TIME DAILY    rosuvastatin (CRESTOR) 20 MG tablet (Taking)    Sig - Route: Take 1 tablet by mouth daily - Oral          Key Antihyperglycemic Medications       Patient is on no antihyperglycemic meds. Past Medical History, Past Surgical History, Family history, Social History, and Medications were all reviewed with the patient today and updated as necessary. Prior to Admission medications    Medication Sig Start Date End Date Taking?  Authorizing Provider   Multiple Vitamin (MULTIVITAMIN ADULT PO) Take by mouth   Yes Historical Provider, MD   finasteride (PROSCAR) 5 MG tablet Take 1 tablet by
(4) no impairment

## 2023-07-17 ENCOUNTER — TELEPHONE (OUTPATIENT)
Age: 78
End: 2023-07-17

## 2023-07-19 NOTE — TELEPHONE ENCOUNTER
\"Study looks normal, can see him in follow-up to discuss if need be. \"    Called pt and informed him of results per Dr. Ethelda Goodpasture. Pt voiced understanding.

## 2023-09-13 ENCOUNTER — TELEPHONE (OUTPATIENT)
Dept: ORTHOPEDIC SURGERY | Age: 78
End: 2023-09-13

## 2023-09-13 DIAGNOSIS — M54.16 RADICULOPATHY, LUMBAR REGION: Primary | ICD-10-CM

## 2023-09-13 RX ORDER — PREGABALIN 225 MG/1
225 CAPSULE ORAL 2 TIMES DAILY
Qty: 180 CAPSULE | Refills: 0 | Status: CANCELLED | OUTPATIENT
Start: 2023-09-13 | End: 2023-12-12

## 2023-09-20 ENCOUNTER — OFFICE VISIT (OUTPATIENT)
Dept: INTERNAL MEDICINE CLINIC | Facility: CLINIC | Age: 78
End: 2023-09-20
Payer: MEDICARE

## 2023-09-20 VITALS
BODY MASS INDEX: 26.18 KG/M2 | HEART RATE: 68 BPM | DIASTOLIC BLOOD PRESSURE: 70 MMHG | SYSTOLIC BLOOD PRESSURE: 137 MMHG | OXYGEN SATURATION: 99 % | TEMPERATURE: 97.6 F | WEIGHT: 204 LBS | HEIGHT: 74 IN

## 2023-09-20 DIAGNOSIS — Z00.00 MEDICARE ANNUAL WELLNESS VISIT, SUBSEQUENT: Primary | ICD-10-CM

## 2023-09-20 DIAGNOSIS — I25.84 CORONARY ARTERY DISEASE DUE TO CALCIFIED CORONARY LESION: ICD-10-CM

## 2023-09-20 DIAGNOSIS — Z12.5 PROSTATE CANCER SCREENING: ICD-10-CM

## 2023-09-20 DIAGNOSIS — E87.6 HYPOKALEMIA: ICD-10-CM

## 2023-09-20 DIAGNOSIS — I25.10 CORONARY ARTERY DISEASE DUE TO CALCIFIED CORONARY LESION: ICD-10-CM

## 2023-09-20 DIAGNOSIS — K21.9 GASTROESOPHAGEAL REFLUX DISEASE WITHOUT ESOPHAGITIS: ICD-10-CM

## 2023-09-20 DIAGNOSIS — M54.50 CHRONIC BILATERAL LOW BACK PAIN WITHOUT SCIATICA: ICD-10-CM

## 2023-09-20 DIAGNOSIS — Z23 ENCOUNTER FOR IMMUNIZATION: ICD-10-CM

## 2023-09-20 DIAGNOSIS — I77.89 ENLARGED THORACIC AORTA (HCC): ICD-10-CM

## 2023-09-20 DIAGNOSIS — E03.4 HYPOTHYROIDISM DUE TO ACQUIRED ATROPHY OF THYROID: ICD-10-CM

## 2023-09-20 DIAGNOSIS — I10 ESSENTIAL HYPERTENSION: ICD-10-CM

## 2023-09-20 DIAGNOSIS — G89.29 CHRONIC BILATERAL LOW BACK PAIN WITHOUT SCIATICA: ICD-10-CM

## 2023-09-20 DIAGNOSIS — R73.03 PREDIABETES: ICD-10-CM

## 2023-09-20 DIAGNOSIS — E78.2 MIXED HYPERLIPIDEMIA: ICD-10-CM

## 2023-09-20 LAB
ALBUMIN SERPL-MCNC: 3.9 G/DL (ref 3.2–4.6)
ALBUMIN/GLOB SERPL: 1.3 (ref 0.4–1.6)
ALP SERPL-CCNC: 63 U/L (ref 50–136)
ALT SERPL-CCNC: 29 U/L (ref 12–65)
ANION GAP SERPL CALC-SCNC: 7 MMOL/L (ref 2–11)
APPEARANCE UR: CLEAR
AST SERPL-CCNC: 18 U/L (ref 15–37)
BACTERIA URNS QL MICRO: NEGATIVE /HPF
BASOPHILS # BLD: 0.1 K/UL (ref 0–0.2)
BASOPHILS NFR BLD: 1 % (ref 0–2)
BILIRUB DIRECT SERPL-MCNC: 0.1 MG/DL
BILIRUB SERPL-MCNC: 0.4 MG/DL (ref 0.2–1.1)
BILIRUB UR QL: NEGATIVE
BUN SERPL-MCNC: 15 MG/DL (ref 8–23)
CALCIUM SERPL-MCNC: 8.8 MG/DL (ref 8.3–10.4)
CASTS URNS QL MICRO: ABNORMAL /LPF (ref 0–2)
CHLORIDE SERPL-SCNC: 105 MMOL/L (ref 101–110)
CHOLEST SERPL-MCNC: 86 MG/DL
CO2 SERPL-SCNC: 27 MMOL/L (ref 21–32)
COLOR UR: ABNORMAL
CREAT SERPL-MCNC: 1 MG/DL (ref 0.8–1.5)
DIFFERENTIAL METHOD BLD: ABNORMAL
EOSINOPHIL # BLD: 0.1 K/UL (ref 0–0.8)
EOSINOPHIL NFR BLD: 2 % (ref 0.5–7.8)
EPI CELLS #/AREA URNS HPF: ABNORMAL /HPF (ref 0–5)
ERYTHROCYTE [DISTWIDTH] IN BLOOD BY AUTOMATED COUNT: 12.7 % (ref 11.9–14.6)
EST. AVERAGE GLUCOSE BLD GHB EST-MCNC: 128 MG/DL
GLOBULIN SER CALC-MCNC: 3.1 G/DL (ref 2.8–4.5)
GLUCOSE SERPL-MCNC: 125 MG/DL (ref 65–100)
GLUCOSE UR STRIP.AUTO-MCNC: NEGATIVE MG/DL
HBA1C MFR BLD: 6.1 % (ref 4.8–5.6)
HCT VFR BLD AUTO: 39.6 % (ref 41.1–50.3)
HDLC SERPL-MCNC: 35 MG/DL (ref 40–60)
HDLC SERPL: 2.5
HGB BLD-MCNC: 13.1 G/DL (ref 13.6–17.2)
HGB UR QL STRIP: ABNORMAL
IMM GRANULOCYTES # BLD AUTO: 0 K/UL (ref 0–0.5)
IMM GRANULOCYTES NFR BLD AUTO: 0 % (ref 0–5)
KETONES UR QL STRIP.AUTO: NEGATIVE MG/DL
LDLC SERPL CALC-MCNC: 31.2 MG/DL
LEUKOCYTE ESTERASE UR QL STRIP.AUTO: NEGATIVE
LYMPHOCYTES # BLD: 2.1 K/UL (ref 0.5–4.6)
LYMPHOCYTES NFR BLD: 26 % (ref 13–44)
MCH RBC QN AUTO: 29.8 PG (ref 26.1–32.9)
MCHC RBC AUTO-ENTMCNC: 33.1 G/DL (ref 31.4–35)
MCV RBC AUTO: 90 FL (ref 82–102)
MONOCYTES # BLD: 0.9 K/UL (ref 0.1–1.3)
MONOCYTES NFR BLD: 12 % (ref 4–12)
NEUTS SEG # BLD: 4.8 K/UL (ref 1.7–8.2)
NEUTS SEG NFR BLD: 59 % (ref 43–78)
NITRITE UR QL STRIP.AUTO: NEGATIVE
NRBC # BLD: 0 K/UL (ref 0–0.2)
PH UR STRIP: 5.5 (ref 5–9)
PLATELET # BLD AUTO: 381 K/UL (ref 150–450)
PMV BLD AUTO: 9.6 FL (ref 9.4–12.3)
POTASSIUM SERPL-SCNC: 4.3 MMOL/L (ref 3.5–5.1)
PROT SERPL-MCNC: 7 G/DL (ref 6.3–8.2)
PROT UR STRIP-MCNC: NEGATIVE MG/DL
PSA SERPL-MCNC: 2.7 NG/ML
RBC # BLD AUTO: 4.4 M/UL (ref 4.23–5.6)
RBC #/AREA URNS HPF: ABNORMAL /HPF (ref 0–5)
SODIUM SERPL-SCNC: 139 MMOL/L (ref 133–143)
SP GR UR REFRACTOMETRY: 1.01 (ref 1–1.02)
TRIGL SERPL-MCNC: 99 MG/DL (ref 35–150)
TSH, 3RD GENERATION: 3.78 UIU/ML (ref 0.36–3.74)
UROBILINOGEN UR QL STRIP.AUTO: 0.2 EU/DL (ref 0.2–1)
VLDLC SERPL CALC-MCNC: 19.8 MG/DL (ref 6–23)
WBC # BLD AUTO: 8 K/UL (ref 4.3–11.1)
WBC URNS QL MICRO: ABNORMAL /HPF (ref 0–4)

## 2023-09-20 PROCEDURE — G8417 CALC BMI ABV UP PARAM F/U: HCPCS | Performed by: INTERNAL MEDICINE

## 2023-09-20 PROCEDURE — 3075F SYST BP GE 130 - 139MM HG: CPT | Performed by: INTERNAL MEDICINE

## 2023-09-20 PROCEDURE — 3078F DIAST BP <80 MM HG: CPT | Performed by: INTERNAL MEDICINE

## 2023-09-20 PROCEDURE — G0008 ADMIN INFLUENZA VIRUS VAC: HCPCS | Performed by: INTERNAL MEDICINE

## 2023-09-20 PROCEDURE — 1123F ACP DISCUSS/DSCN MKR DOCD: CPT | Performed by: INTERNAL MEDICINE

## 2023-09-20 PROCEDURE — 90694 VACC AIIV4 NO PRSRV 0.5ML IM: CPT | Performed by: INTERNAL MEDICINE

## 2023-09-20 PROCEDURE — G0439 PPPS, SUBSEQ VISIT: HCPCS | Performed by: INTERNAL MEDICINE

## 2023-09-20 PROCEDURE — 1036F TOBACCO NON-USER: CPT | Performed by: INTERNAL MEDICINE

## 2023-09-20 PROCEDURE — G8427 DOCREV CUR MEDS BY ELIG CLIN: HCPCS | Performed by: INTERNAL MEDICINE

## 2023-09-20 PROCEDURE — 99214 OFFICE O/P EST MOD 30 MIN: CPT | Performed by: INTERNAL MEDICINE

## 2023-09-20 RX ORDER — AMLODIPINE BESYLATE 5 MG/1
5 TABLET ORAL DAILY
Qty: 90 TABLET | Refills: 1 | Status: SHIPPED | OUTPATIENT
Start: 2023-09-20

## 2023-09-20 RX ORDER — POTASSIUM CHLORIDE 20 MEQ/1
20 TABLET, EXTENDED RELEASE ORAL DAILY
Qty: 90 TABLET | Refills: 1 | Status: SHIPPED | OUTPATIENT
Start: 2023-09-20

## 2023-09-20 RX ORDER — DULOXETIN HYDROCHLORIDE 60 MG/1
60 CAPSULE, DELAYED RELEASE ORAL DAILY
Qty: 90 CAPSULE | Refills: 1 | Status: CANCELLED | OUTPATIENT
Start: 2023-09-20 | End: 2023-12-20

## 2023-09-20 RX ORDER — NORTRIPTYLINE HYDROCHLORIDE 25 MG/1
25 CAPSULE ORAL
COMMUNITY
Start: 2023-09-18

## 2023-09-20 RX ORDER — BUPRENORPHINE 10 UG/H
1 PATCH TRANSDERMAL WEEKLY
COMMUNITY
Start: 2023-09-19

## 2023-09-20 RX ORDER — EZETIMIBE 10 MG/1
10 TABLET ORAL DAILY
Qty: 90 TABLET | Refills: 1 | Status: SHIPPED | OUTPATIENT
Start: 2023-09-20

## 2023-09-20 RX ORDER — OMEPRAZOLE 40 MG/1
40 CAPSULE, DELAYED RELEASE ORAL DAILY
Qty: 90 CAPSULE | Refills: 1 | Status: SHIPPED | OUTPATIENT
Start: 2023-09-20

## 2023-09-20 RX ORDER — LEVOTHYROXINE SODIUM 0.05 MG/1
50 TABLET ORAL DAILY
Qty: 90 TABLET | Refills: 1 | Status: SHIPPED | OUTPATIENT
Start: 2023-09-20

## 2023-09-20 RX ORDER — LISINOPRIL AND HYDROCHLOROTHIAZIDE 20; 12.5 MG/1; MG/1
1 TABLET ORAL DAILY
Qty: 90 TABLET | Refills: 1 | Status: SHIPPED | OUTPATIENT
Start: 2023-09-20

## 2023-09-20 RX ORDER — ROSUVASTATIN CALCIUM 20 MG/1
20 TABLET, COATED ORAL DAILY
Qty: 90 TABLET | Refills: 1 | Status: SHIPPED | OUTPATIENT
Start: 2023-09-20

## 2023-09-20 SDOH — ECONOMIC STABILITY: FOOD INSECURITY: WITHIN THE PAST 12 MONTHS, YOU WORRIED THAT YOUR FOOD WOULD RUN OUT BEFORE YOU GOT MONEY TO BUY MORE.: NEVER TRUE

## 2023-09-20 SDOH — ECONOMIC STABILITY: INCOME INSECURITY: HOW HARD IS IT FOR YOU TO PAY FOR THE VERY BASICS LIKE FOOD, HOUSING, MEDICAL CARE, AND HEATING?: NOT HARD AT ALL

## 2023-09-20 ASSESSMENT — LIFESTYLE VARIABLES
HOW OFTEN DO YOU HAVE A DRINK CONTAINING ALCOHOL: NEVER
HOW MANY STANDARD DRINKS CONTAINING ALCOHOL DO YOU HAVE ON A TYPICAL DAY: PATIENT DOES NOT DRINK

## 2023-09-20 ASSESSMENT — ANXIETY QUESTIONNAIRES
2. NOT BEING ABLE TO STOP OR CONTROL WORRYING: 0
3. WORRYING TOO MUCH ABOUT DIFFERENT THINGS: 0
4. TROUBLE RELAXING: 0
7. FEELING AFRAID AS IF SOMETHING AWFUL MIGHT HAPPEN: 0
6. BECOMING EASILY ANNOYED OR IRRITABLE: 0
5. BEING SO RESTLESS THAT IT IS HARD TO SIT STILL: 0
1. FEELING NERVOUS, ANXIOUS, OR ON EDGE: 0
IF YOU CHECKED OFF ANY PROBLEMS ON THIS QUESTIONNAIRE, HOW DIFFICULT HAVE THESE PROBLEMS MADE IT FOR YOU TO DO YOUR WORK, TAKE CARE OF THINGS AT HOME, OR GET ALONG WITH OTHER PEOPLE: NOT DIFFICULT AT ALL
GAD7 TOTAL SCORE: 0

## 2023-09-20 ASSESSMENT — PATIENT HEALTH QUESTIONNAIRE - PHQ9
SUM OF ALL RESPONSES TO PHQ9 QUESTIONS 1 & 2: 0
SUM OF ALL RESPONSES TO PHQ QUESTIONS 1-9: 0
SUM OF ALL RESPONSES TO PHQ QUESTIONS 1-9: 0
2. FEELING DOWN, DEPRESSED OR HOPELESS: 0
SUM OF ALL RESPONSES TO PHQ QUESTIONS 1-9: 0
SUM OF ALL RESPONSES TO PHQ QUESTIONS 1-9: 0
1. LITTLE INTEREST OR PLEASURE IN DOING THINGS: 0

## 2023-09-20 ASSESSMENT — ENCOUNTER SYMPTOMS
BLOOD IN STOOL: 0
SHORTNESS OF BREATH: 0

## 2023-09-20 NOTE — PROGRESS NOTES
Nohemi May M.D. Internal Medicine  [address], [address] [place] [address]  Phone: [phone] Fax: [phone]    Hypertension  This is a chronic problem. The current episode started more than 1 year ago. The problem has been waxing and waning since onset. The problem is controlled. Pertinent negatives include no chest pain or shortness of breath. Agents associated with hypertension include NSAIDs. Risk factors for coronary artery disease include male gender and dyslipidemia. Past treatments include ACE inhibitors, diuretics and calcium channel blockers. The current treatment provides moderate improvement. Latasha Ackerman is a 66 y.o. White (non-) male. Current Outpatient Medications   Medication Sig Dispense Refill    buprenorphine (BUPRENEX) 10 MCG/HR PTWK Place 1 patch onto the skin once a week.       nortriptyline (PAMELOR) 25 MG capsule Take 1 capsule by mouth      Multiple Vitamin (MULTIVITAMIN ADULT PO) Take by mouth      finasteride (PROSCAR) 5 MG tablet Take 1 tablet by mouth daily      amLODIPine (NORVASC) 5 MG tablet Take 1 tablet by mouth daily 90 tablet 1    ezetimibe (ZETIA) 10 MG tablet Take 1 tablet by mouth daily 90 tablet 1    levothyroxine (SYNTHROID) 50 MCG tablet TAKE ONE TABLET BY MOUTH EVERY MORNING BEFORE BREAKFAST 90 tablet 1    lisinopril-hydroCHLOROthiazide (PRINZIDE;ZESTORETIC) 20-12.5 MG per tablet TAKE ONE TABLET BY MOUTH ONE TIME DAILY 90 tablet 1    omeprazole (PRILOSEC) 40 MG delayed release capsule Take 1 capsule by mouth daily 90 capsule 1    potassium chloride (KLOR-CON M) 20 MEQ extended release tablet Take 1 tablet by mouth daily 90 tablet 1    rosuvastatin (CRESTOR) 20 MG tablet Take 1 tablet by mouth daily 90 tablet 1    aspirin 81 MG EC tablet Take 1 tablet by mouth daily      cyanocobalamin 1000 MCG tablet Take 1 tablet by mouth daily      Hyoscyamine Sulfate SL 0.125 MG SUBL Place 0.125 mg under the tongue every 4 hours

## 2024-02-21 ENCOUNTER — OFFICE VISIT (OUTPATIENT)
Dept: UROLOGY | Age: 79
End: 2024-02-21
Payer: MEDICARE

## 2024-02-21 DIAGNOSIS — N13.8 BPH WITH OBSTRUCTION/LOWER URINARY TRACT SYMPTOMS: Primary | ICD-10-CM

## 2024-02-21 DIAGNOSIS — N40.1 BPH WITH OBSTRUCTION/LOWER URINARY TRACT SYMPTOMS: Primary | ICD-10-CM

## 2024-02-21 DIAGNOSIS — K59.00 CONSTIPATION, UNSPECIFIED CONSTIPATION TYPE: ICD-10-CM

## 2024-02-21 DIAGNOSIS — R31.29 MICROHEMATURIA: ICD-10-CM

## 2024-02-21 LAB
BILIRUBIN, URINE, POC: NEGATIVE
BLOOD URINE, POC: NORMAL
GLUCOSE URINE, POC: NEGATIVE
KETONES, URINE, POC: NEGATIVE
LEUKOCYTE ESTERASE, URINE, POC: NEGATIVE
NITRITE, URINE, POC: NEGATIVE
PH, URINE, POC: 7 (ref 4.6–8)
PROTEIN,URINE, POC: NEGATIVE
PVR, POC: 5 CC
SPECIFIC GRAVITY, URINE, POC: 1.02 (ref 1–1.03)
URINALYSIS CLARITY, POC: NORMAL
URINALYSIS COLOR, POC: NORMAL
UROBILINOGEN, POC: NORMAL

## 2024-02-21 PROCEDURE — 51798 US URINE CAPACITY MEASURE: CPT | Performed by: NURSE PRACTITIONER

## 2024-02-21 PROCEDURE — G8484 FLU IMMUNIZE NO ADMIN: HCPCS | Performed by: NURSE PRACTITIONER

## 2024-02-21 PROCEDURE — G8427 DOCREV CUR MEDS BY ELIG CLIN: HCPCS | Performed by: NURSE PRACTITIONER

## 2024-02-21 PROCEDURE — G8417 CALC BMI ABV UP PARAM F/U: HCPCS | Performed by: NURSE PRACTITIONER

## 2024-02-21 PROCEDURE — 1036F TOBACCO NON-USER: CPT | Performed by: NURSE PRACTITIONER

## 2024-02-21 PROCEDURE — 99214 OFFICE O/P EST MOD 30 MIN: CPT | Performed by: NURSE PRACTITIONER

## 2024-02-21 PROCEDURE — 1123F ACP DISCUSS/DSCN MKR DOCD: CPT | Performed by: NURSE PRACTITIONER

## 2024-02-21 PROCEDURE — 81003 URINALYSIS AUTO W/O SCOPE: CPT | Performed by: NURSE PRACTITIONER

## 2024-02-21 NOTE — PROGRESS NOTES
[facility] Urology  [address] [address]  [phone]          Brijesh Mcmanus  : 1945    Chief Complaint   Patient presents with    Follow-up    Benign Prostatic Hypertrophy          HPI     Brijesh Mcmanus is a 78 y.o. male with a PMH of BPH/Urinary retention s/p urolift 22 who presents for fu.      Seen 22 and he reported voiding well on his own after surgery.  Off of flomax and finasteride since surgery and doing well.  Only issue is he reported NO urge to urinate and often will go 6-8 hours without voiding.  No hematuria.  No UTis.  No urinary incontinence.      Presented to ER on 22 w c/o urinary retention and constipation. No BM x 7 days. MOM enema adm. Catheter inserted and pt discharged home. VT passed on 23.  cc.     Lab Results   Component Value Date    PSA 2.7 2023    PSA 2.5 2022    PSA 1.5 2021     Seen today for ROV. He has no new concerns. Has recently started Norco ATC per pain management for back pain. Occ constipation w this. PVR 5 cc via u/s today.        Past Medical History:   Diagnosis Date    Axonal polyneuropathy 2018    B12 deficiency 2019    BPH (benign prostatic hyperplasia) 2012    CAD (coronary artery disease) 2020    Ca score 468    Diverticulitis     Family history of diabetes mellitus     GERD (gastroesophageal reflux disease)     medication    HDL deficiency 2012    Hemorrhoid     int and ext    Hypertension 2012    medication    Hypokalemia     Hypothyroid     medication    Leukocytosis     Lumbar radicular pain 2019    Murmur, cardiac     echo 20  EF 55-60%    Pain of right hip joint 2019    Paresthesia of right foot 2018    Prediabetes     diet control and weight loss  21 A1c 6.4    Right leg pain 2018    Systolic murmur 9/3/2020     Past Surgical History:   Procedure Laterality Date    COLONOSCOPY      LUMBAR

## 2024-03-20 ENCOUNTER — OFFICE VISIT (OUTPATIENT)
Dept: INTERNAL MEDICINE CLINIC | Facility: CLINIC | Age: 79
End: 2024-03-20
Payer: MEDICARE

## 2024-03-20 VITALS
DIASTOLIC BLOOD PRESSURE: 72 MMHG | OXYGEN SATURATION: 96 % | BODY MASS INDEX: 25.54 KG/M2 | RESPIRATION RATE: 16 BRPM | SYSTOLIC BLOOD PRESSURE: 127 MMHG | WEIGHT: 199 LBS | TEMPERATURE: 98.1 F | HEART RATE: 67 BPM | HEIGHT: 74 IN

## 2024-03-20 DIAGNOSIS — I25.84 CORONARY ARTERY DISEASE DUE TO CALCIFIED CORONARY LESION: ICD-10-CM

## 2024-03-20 DIAGNOSIS — I10 ESSENTIAL HYPERTENSION: ICD-10-CM

## 2024-03-20 DIAGNOSIS — E87.6 HYPOKALEMIA: ICD-10-CM

## 2024-03-20 DIAGNOSIS — I25.10 CORONARY ARTERY DISEASE DUE TO CALCIFIED CORONARY LESION: Primary | ICD-10-CM

## 2024-03-20 DIAGNOSIS — I25.10 CORONARY ARTERY DISEASE DUE TO CALCIFIED CORONARY LESION: ICD-10-CM

## 2024-03-20 DIAGNOSIS — R73.03 PREDIABETES: ICD-10-CM

## 2024-03-20 DIAGNOSIS — E03.4 HYPOTHYROIDISM DUE TO ACQUIRED ATROPHY OF THYROID: ICD-10-CM

## 2024-03-20 DIAGNOSIS — E78.2 MIXED HYPERLIPIDEMIA: ICD-10-CM

## 2024-03-20 DIAGNOSIS — I77.89 ENLARGED THORACIC AORTA (HCC): ICD-10-CM

## 2024-03-20 DIAGNOSIS — K21.9 GASTROESOPHAGEAL REFLUX DISEASE WITHOUT ESOPHAGITIS: ICD-10-CM

## 2024-03-20 DIAGNOSIS — I25.84 CORONARY ARTERY DISEASE DUE TO CALCIFIED CORONARY LESION: Primary | ICD-10-CM

## 2024-03-20 LAB
ALBUMIN SERPL-MCNC: 4.1 G/DL (ref 3.2–4.6)
ALBUMIN/GLOB SERPL: 1.4 (ref 0.4–1.6)
ALP SERPL-CCNC: 58 U/L (ref 50–136)
ALT SERPL-CCNC: 24 U/L (ref 12–65)
ANION GAP SERPL CALC-SCNC: 5 MMOL/L (ref 2–11)
AST SERPL-CCNC: 17 U/L (ref 15–37)
BASOPHILS # BLD: 0.1 K/UL (ref 0–0.2)
BASOPHILS NFR BLD: 1 % (ref 0–2)
BILIRUB DIRECT SERPL-MCNC: 0.2 MG/DL
BILIRUB SERPL-MCNC: 0.6 MG/DL (ref 0.2–1.1)
BUN SERPL-MCNC: 16 MG/DL (ref 8–23)
CALCIUM SERPL-MCNC: 9.6 MG/DL (ref 8.3–10.4)
CHLORIDE SERPL-SCNC: 105 MMOL/L (ref 103–113)
CO2 SERPL-SCNC: 28 MMOL/L (ref 21–32)
CREAT SERPL-MCNC: 0.8 MG/DL (ref 0.8–1.5)
DIFFERENTIAL METHOD BLD: ABNORMAL
EOSINOPHIL # BLD: 0.1 K/UL (ref 0–0.8)
EOSINOPHIL NFR BLD: 1 % (ref 0.5–7.8)
ERYTHROCYTE [DISTWIDTH] IN BLOOD BY AUTOMATED COUNT: 12.5 % (ref 11.9–14.6)
GLOBULIN SER CALC-MCNC: 3 G/DL (ref 2.8–4.5)
GLUCOSE SERPL-MCNC: 115 MG/DL (ref 65–100)
HCT VFR BLD AUTO: 41.5 % (ref 41.1–50.3)
HGB BLD-MCNC: 14.1 G/DL (ref 13.6–17.2)
IMM GRANULOCYTES # BLD AUTO: 0 K/UL (ref 0–0.5)
IMM GRANULOCYTES NFR BLD AUTO: 0 % (ref 0–5)
LYMPHOCYTES # BLD: 2.4 K/UL (ref 0.5–4.6)
LYMPHOCYTES NFR BLD: 29 % (ref 13–44)
MCH RBC QN AUTO: 29.6 PG (ref 26.1–32.9)
MCHC RBC AUTO-ENTMCNC: 34 G/DL (ref 31.4–35)
MCV RBC AUTO: 87 FL (ref 82–102)
MONOCYTES # BLD: 0.8 K/UL (ref 0.1–1.3)
MONOCYTES NFR BLD: 10 % (ref 4–12)
NEUTS SEG # BLD: 4.8 K/UL (ref 1.7–8.2)
NEUTS SEG NFR BLD: 59 % (ref 43–78)
NRBC # BLD: 0 K/UL (ref 0–0.2)
PLATELET # BLD AUTO: 381 K/UL (ref 150–450)
PMV BLD AUTO: 9.3 FL (ref 9.4–12.3)
POTASSIUM SERPL-SCNC: 3.7 MMOL/L (ref 3.5–5.1)
PROT SERPL-MCNC: 7.1 G/DL (ref 6.3–8.2)
RBC # BLD AUTO: 4.77 M/UL (ref 4.23–5.6)
SODIUM SERPL-SCNC: 138 MMOL/L (ref 136–146)
TSH, 3RD GENERATION: 1.88 UIU/ML (ref 0.36–3.74)
WBC # BLD AUTO: 8.1 K/UL (ref 4.3–11.1)

## 2024-03-20 PROCEDURE — 1123F ACP DISCUSS/DSCN MKR DOCD: CPT | Performed by: INTERNAL MEDICINE

## 2024-03-20 PROCEDURE — G8484 FLU IMMUNIZE NO ADMIN: HCPCS | Performed by: INTERNAL MEDICINE

## 2024-03-20 PROCEDURE — 1036F TOBACCO NON-USER: CPT | Performed by: INTERNAL MEDICINE

## 2024-03-20 PROCEDURE — 99214 OFFICE O/P EST MOD 30 MIN: CPT | Performed by: INTERNAL MEDICINE

## 2024-03-20 PROCEDURE — 3078F DIAST BP <80 MM HG: CPT | Performed by: INTERNAL MEDICINE

## 2024-03-20 PROCEDURE — G8417 CALC BMI ABV UP PARAM F/U: HCPCS | Performed by: INTERNAL MEDICINE

## 2024-03-20 PROCEDURE — 3074F SYST BP LT 130 MM HG: CPT | Performed by: INTERNAL MEDICINE

## 2024-03-20 PROCEDURE — G8427 DOCREV CUR MEDS BY ELIG CLIN: HCPCS | Performed by: INTERNAL MEDICINE

## 2024-03-20 RX ORDER — LEVOTHYROXINE SODIUM 0.05 MG/1
50 TABLET ORAL DAILY
Qty: 90 TABLET | Refills: 1 | Status: SHIPPED | OUTPATIENT
Start: 2024-03-20

## 2024-03-20 RX ORDER — ROSUVASTATIN CALCIUM 20 MG/1
20 TABLET, COATED ORAL DAILY
Qty: 90 TABLET | Refills: 1 | Status: SHIPPED | OUTPATIENT
Start: 2024-03-20

## 2024-03-20 RX ORDER — EZETIMIBE 10 MG/1
10 TABLET ORAL DAILY
Qty: 90 TABLET | Refills: 1 | Status: SHIPPED | OUTPATIENT
Start: 2024-03-20

## 2024-03-20 RX ORDER — POTASSIUM CHLORIDE 20 MEQ/1
20 TABLET, EXTENDED RELEASE ORAL DAILY
Qty: 90 TABLET | Refills: 1 | Status: SHIPPED | OUTPATIENT
Start: 2024-03-20

## 2024-03-20 RX ORDER — OMEPRAZOLE 40 MG/1
40 CAPSULE, DELAYED RELEASE ORAL DAILY
Qty: 90 CAPSULE | Refills: 1 | Status: SHIPPED | OUTPATIENT
Start: 2024-03-20

## 2024-03-20 RX ORDER — AMLODIPINE BESYLATE 5 MG/1
5 TABLET ORAL DAILY
Qty: 90 TABLET | Refills: 1 | Status: SHIPPED | OUTPATIENT
Start: 2024-03-20

## 2024-03-20 RX ORDER — LISINOPRIL AND HYDROCHLOROTHIAZIDE 20; 12.5 MG/1; MG/1
1 TABLET ORAL DAILY
Qty: 90 TABLET | Refills: 1 | Status: SHIPPED | OUTPATIENT
Start: 2024-03-20

## 2024-03-20 ASSESSMENT — ENCOUNTER SYMPTOMS: BLOOD IN STOOL: 0

## 2024-03-20 NOTE — PROGRESS NOTES
Right lower leg: No edema.      Left lower leg: No edema.   Skin:     General: Skin is warm and dry.      Coloration: Skin is not jaundiced or pale.      Findings: No erythema.   Neurological:      General: No focal deficit present.      Mental Status: He is alert and oriented to person, place, and time. Mental status is at baseline.   Psychiatric:         Mood and Affect: Mood normal.         Behavior: Behavior normal.         Thought Content: Thought content normal.         Judgment: Judgment normal.     I have reviewed/discussed the above normal BMI with the patient.  I have recommended the following interventions: dietary management education, guidance, and counseling and encourage exercise .     ASSESSMENT AND PLAN:    CAD: CAD: Ca Score 8/19/20 = 468.  NM Stress 9/18/20 = Norm Perf; EF 64%.  Released by Cardiology (Dr. Peralta).  Maintained on Asa.  Risk factors medically modified per below.  Taking medications w/o problems.  Well controlled w/o angina or MCHUGH.  Continue Asa.  Continue risk factor modification per below.  Follow up with Dr. Peralta (Also with Thoracic Aortic Enlargement) if needed...   HTN: Taking current regimen (Lisinopril-HCT 20-12.5, Norvasc 5) w/o probs.  Home BPs =  120-130s/70s.  Well controlled.  Continue current regimen.  Asked to monitor BP at home, call me if it runs above 140/80, and bring in a log next time.   HLD: Taking current regimen (Crestor 20 and Zetia 10) w/o probs.  Well controlled.  Continue current regimen.   FLPs:   1/20/20 on Pravachol 40 = [174/114/36/118].   9/20/23 on Crestor 20 and Zetia 10 = [86/31.2/35/99].   Hypothyroidism: Taking current regimen (Synthroid 50) w/o probs.  Well controlled.  Continue current regimen.   TSH’s:  7/11/19 on Synthroid 50 = 4.870.   9/20/23 on Synthroid 50 = 3.780.   3/20/24 on Synthroid 50 =   Prediabetes: Stable.  Continue to monitor.   A1C’s:   12/20/18 = 5.9.   8/13/21 = 6.4.   9/20/23 = 6.1.   3/20/24 =   Hypokalemia: Taking

## 2024-03-21 LAB
EST. AVERAGE GLUCOSE BLD GHB EST-MCNC: 128 MG/DL
HBA1C MFR BLD: 6.1 % (ref 4.8–5.6)

## 2024-08-05 DIAGNOSIS — K21.9 GASTROESOPHAGEAL REFLUX DISEASE WITHOUT ESOPHAGITIS: ICD-10-CM

## 2024-08-05 RX ORDER — OMEPRAZOLE 40 MG/1
40 CAPSULE, DELAYED RELEASE ORAL DAILY
Qty: 90 CAPSULE | Refills: 0 | Status: SHIPPED | OUTPATIENT
Start: 2024-08-05

## 2024-08-05 NOTE — TELEPHONE ENCOUNTER
Patient called this afternoon and stated that he was needing a refill on his omeprazole, medication was last sent in 3/20/2024 for 90 days and 1 refill

## 2024-10-08 DIAGNOSIS — I25.84 CORONARY ARTERY DISEASE DUE TO CALCIFIED CORONARY LESION: ICD-10-CM

## 2024-10-08 DIAGNOSIS — I10 ESSENTIAL HYPERTENSION: ICD-10-CM

## 2024-10-08 DIAGNOSIS — E78.2 MIXED HYPERLIPIDEMIA: ICD-10-CM

## 2024-10-08 DIAGNOSIS — E03.4 HYPOTHYROIDISM DUE TO ACQUIRED ATROPHY OF THYROID: ICD-10-CM

## 2024-10-08 DIAGNOSIS — I25.10 CORONARY ARTERY DISEASE DUE TO CALCIFIED CORONARY LESION: ICD-10-CM

## 2024-10-08 RX ORDER — LEVOTHYROXINE SODIUM 50 UG/1
50 TABLET ORAL DAILY
Qty: 30 TABLET | Refills: 0 | OUTPATIENT
Start: 2024-10-08

## 2024-10-08 RX ORDER — EZETIMIBE 10 MG/1
10 TABLET ORAL DAILY
Qty: 30 TABLET | Refills: 0 | Status: CANCELLED | OUTPATIENT
Start: 2024-10-08

## 2024-10-08 RX ORDER — LISINOPRIL AND HYDROCHLOROTHIAZIDE 12.5; 2 MG/1; MG/1
1 TABLET ORAL DAILY
Qty: 30 TABLET | Refills: 0 | Status: CANCELLED | OUTPATIENT
Start: 2024-10-08

## 2024-10-08 RX ORDER — AMLODIPINE BESYLATE 5 MG/1
5 TABLET ORAL DAILY
Qty: 30 TABLET | Refills: 0 | Status: CANCELLED | OUTPATIENT
Start: 2024-10-08

## 2024-10-08 RX ORDER — AMLODIPINE BESYLATE 5 MG/1
5 TABLET ORAL DAILY
Qty: 30 TABLET | Refills: 0 | OUTPATIENT
Start: 2024-10-08

## 2024-10-08 RX ORDER — ROSUVASTATIN CALCIUM 20 MG/1
20 TABLET, COATED ORAL DAILY
Qty: 30 TABLET | Refills: 0 | OUTPATIENT
Start: 2024-10-08

## 2024-10-08 RX ORDER — LISINOPRIL AND HYDROCHLOROTHIAZIDE 12.5; 2 MG/1; MG/1
1 TABLET ORAL DAILY
Qty: 30 TABLET | Refills: 0 | OUTPATIENT
Start: 2024-10-08

## 2024-10-08 RX ORDER — LEVOTHYROXINE SODIUM 50 UG/1
50 TABLET ORAL DAILY
Qty: 90 TABLET | Refills: 1 | Status: CANCELLED | OUTPATIENT
Start: 2024-10-08

## 2024-10-08 RX ORDER — ROSUVASTATIN CALCIUM 20 MG/1
20 TABLET, COATED ORAL DAILY
Qty: 30 TABLET | Refills: 1 | Status: CANCELLED | OUTPATIENT
Start: 2024-10-08

## 2024-10-08 RX ORDER — EZETIMIBE 10 MG/1
10 TABLET ORAL DAILY
Qty: 30 TABLET | Refills: 0 | OUTPATIENT
Start: 2024-10-08

## 2024-10-08 NOTE — TELEPHONE ENCOUNTER
Patient requests refills of the following:                              Levothyroxine 50 mcg, taking one tablet in the morning before breakfast                              Amlodipine 5 mg , taking one tablet daily                              Eztimbe 10 mg, taking one tablet daily                              Rosuvastatin 20 mg, taking one tablet daily                              Lisinopril-HCTZ 20-12.5 , taking one tablet a day    Medications were last prescribed 3/20/24 for 6 months  Pharmacy - [facility] in [place]  Last seen 3-20-24  next  10-30-24

## 2024-10-11 RX ORDER — ROSUVASTATIN CALCIUM 20 MG/1
20 TABLET, COATED ORAL DAILY
Qty: 30 TABLET | Refills: 0 | Status: SHIPPED | OUTPATIENT
Start: 2024-10-11

## 2024-10-11 RX ORDER — AMLODIPINE BESYLATE 5 MG/1
5 TABLET ORAL DAILY
Qty: 30 TABLET | Refills: 0 | Status: SHIPPED | OUTPATIENT
Start: 2024-10-11

## 2024-10-11 RX ORDER — EZETIMIBE 10 MG/1
10 TABLET ORAL DAILY
Qty: 30 TABLET | Refills: 0 | Status: SHIPPED | OUTPATIENT
Start: 2024-10-11

## 2024-10-11 RX ORDER — LISINOPRIL AND HYDROCHLOROTHIAZIDE 12.5; 2 MG/1; MG/1
1 TABLET ORAL DAILY
Qty: 30 TABLET | Refills: 0 | Status: SHIPPED | OUTPATIENT
Start: 2024-10-11

## 2024-10-31 DIAGNOSIS — E03.4 HYPOTHYROIDISM DUE TO ACQUIRED ATROPHY OF THYROID: ICD-10-CM

## 2024-10-31 DIAGNOSIS — I10 ESSENTIAL HYPERTENSION: ICD-10-CM

## 2024-10-31 DIAGNOSIS — I25.84 CORONARY ARTERY DISEASE DUE TO CALCIFIED CORONARY LESION: ICD-10-CM

## 2024-10-31 DIAGNOSIS — I25.10 CORONARY ARTERY DISEASE DUE TO CALCIFIED CORONARY LESION: ICD-10-CM

## 2024-10-31 RX ORDER — LEVOTHYROXINE SODIUM 50 UG/1
50 TABLET ORAL DAILY
Qty: 15 TABLET | Refills: 0 | Status: SHIPPED | OUTPATIENT
Start: 2024-10-31

## 2024-10-31 RX ORDER — LISINOPRIL AND HYDROCHLOROTHIAZIDE 12.5; 2 MG/1; MG/1
1 TABLET ORAL DAILY
Qty: 30 TABLET | Refills: 0 | Status: CANCELLED | OUTPATIENT
Start: 2024-10-31

## 2024-10-31 RX ORDER — AMLODIPINE BESYLATE 5 MG/1
5 TABLET ORAL DAILY
Qty: 30 TABLET | Refills: 0 | Status: CANCELLED | OUTPATIENT
Start: 2024-10-31

## 2024-10-31 NOTE — TELEPHONE ENCOUNTER
PT arrived in person to check in for his appt; however, PT mixed up the dates and thought his appt was today on 10/31 instead of 10/30/24. I rescheduled him for 11/14/24 at 3 pm.     PT explained to me that he is out on some of his medications. He is needing medication refills for the following:     - lisinopril-hydroCHLOROthiazide (PRINZIDE;ZESTORETIC) 20-12.5 MG per tablet   - levothyroxine (SYNTHROID) 50 MCG tablet    - amLODIPine (NORVASC) 5 MG tablet     Refills can still be sent to [facility] on [address] [facility]. PT can still be reached at [phone].     Next OV: 11/14/24  Last OV: 3/20/24

## 2024-10-31 NOTE — TELEPHONE ENCOUNTER
Pt missed appt on 10/30/224. Pt r/s visit to 11/14/2024. Meds pended for Synthroid only as we did refills for other medications on 10/11/2024 and they are on hold at the pharmacy. I will call and inform pt. Ty

## 2024-11-26 ENCOUNTER — OFFICE VISIT (OUTPATIENT)
Dept: INTERNAL MEDICINE CLINIC | Facility: CLINIC | Age: 79
End: 2024-11-26

## 2024-11-26 VITALS
HEART RATE: 66 BPM | BODY MASS INDEX: 26.31 KG/M2 | HEIGHT: 74 IN | SYSTOLIC BLOOD PRESSURE: 129 MMHG | WEIGHT: 205 LBS | RESPIRATION RATE: 16 BRPM | DIASTOLIC BLOOD PRESSURE: 68 MMHG | TEMPERATURE: 98 F | OXYGEN SATURATION: 96 %

## 2024-11-26 DIAGNOSIS — E03.4 HYPOTHYROIDISM DUE TO ACQUIRED ATROPHY OF THYROID: ICD-10-CM

## 2024-11-26 DIAGNOSIS — I10 ESSENTIAL HYPERTENSION: ICD-10-CM

## 2024-11-26 DIAGNOSIS — I25.84 CORONARY ARTERY DISEASE DUE TO CALCIFIED CORONARY LESION: ICD-10-CM

## 2024-11-26 DIAGNOSIS — E87.6 HYPOKALEMIA: ICD-10-CM

## 2024-11-26 DIAGNOSIS — Z12.5 PROSTATE CANCER SCREENING: ICD-10-CM

## 2024-11-26 DIAGNOSIS — I25.10 CORONARY ARTERY DISEASE DUE TO CALCIFIED CORONARY LESION: ICD-10-CM

## 2024-11-26 DIAGNOSIS — K21.9 GASTROESOPHAGEAL REFLUX DISEASE WITHOUT ESOPHAGITIS: ICD-10-CM

## 2024-11-26 DIAGNOSIS — Z00.00 MEDICARE ANNUAL WELLNESS VISIT, SUBSEQUENT: Primary | ICD-10-CM

## 2024-11-26 DIAGNOSIS — E78.2 MIXED HYPERLIPIDEMIA: ICD-10-CM

## 2024-11-26 DIAGNOSIS — R73.03 PREDIABETES: ICD-10-CM

## 2024-11-26 DIAGNOSIS — I77.89 ENLARGED THORACIC AORTA (HCC): ICD-10-CM

## 2024-11-26 LAB
ALBUMIN SERPL-MCNC: 3.8 G/DL (ref 3.2–4.6)
ALBUMIN/GLOB SERPL: 1.3 (ref 1–1.9)
ALP SERPL-CCNC: 54 U/L (ref 40–129)
ALT SERPL-CCNC: 12 U/L (ref 8–55)
ANION GAP SERPL CALC-SCNC: 10 MMOL/L (ref 7–16)
APPEARANCE UR: CLEAR
AST SERPL-CCNC: 27 U/L (ref 15–37)
BACTERIA URNS QL MICRO: NEGATIVE /HPF
BASOPHILS # BLD: 0.1 K/UL (ref 0–0.2)
BASOPHILS NFR BLD: 1 % (ref 0–2)
BILIRUB DIRECT SERPL-MCNC: <0.2 MG/DL (ref 0–0.4)
BILIRUB SERPL-MCNC: 0.3 MG/DL (ref 0–1.2)
BILIRUB UR QL: NEGATIVE
BUN SERPL-MCNC: 14 MG/DL (ref 8–23)
CALCIUM SERPL-MCNC: 9.1 MG/DL (ref 8.8–10.2)
CHLORIDE SERPL-SCNC: 97 MMOL/L (ref 98–107)
CHOLEST SERPL-MCNC: 86 MG/DL (ref 0–200)
CO2 SERPL-SCNC: 27 MMOL/L (ref 20–29)
COLOR UR: ABNORMAL
CREAT SERPL-MCNC: 0.85 MG/DL (ref 0.8–1.3)
DIFFERENTIAL METHOD BLD: ABNORMAL
EOSINOPHIL # BLD: 0.2 K/UL (ref 0–0.8)
EOSINOPHIL NFR BLD: 3 % (ref 0.5–7.8)
EPI CELLS #/AREA URNS HPF: ABNORMAL /HPF (ref 0–5)
ERYTHROCYTE [DISTWIDTH] IN BLOOD BY AUTOMATED COUNT: 13.2 % (ref 11.9–14.6)
EST. AVERAGE GLUCOSE BLD GHB EST-MCNC: 128 MG/DL
GLOBULIN SER CALC-MCNC: 3 G/DL (ref 2.3–3.5)
GLUCOSE SERPL-MCNC: 115 MG/DL (ref 70–99)
GLUCOSE UR STRIP.AUTO-MCNC: NEGATIVE MG/DL
HBA1C MFR BLD: 6.1 % (ref 0–5.6)
HCT VFR BLD AUTO: 39.1 % (ref 41.1–50.3)
HDLC SERPL-MCNC: 32 MG/DL (ref 40–60)
HDLC SERPL: 2.7 (ref 0–5)
HGB BLD-MCNC: 13.1 G/DL (ref 13.6–17.2)
HGB UR QL STRIP: ABNORMAL
HYALINE CASTS URNS QL MICRO: ABNORMAL /LPF
IMM GRANULOCYTES # BLD AUTO: 0 K/UL (ref 0–0.5)
IMM GRANULOCYTES NFR BLD AUTO: 0 % (ref 0–5)
KETONES UR QL STRIP.AUTO: NEGATIVE MG/DL
LDLC SERPL CALC-MCNC: 31 MG/DL (ref 0–100)
LEUKOCYTE ESTERASE UR QL STRIP.AUTO: NEGATIVE
LYMPHOCYTES # BLD: 2.8 K/UL (ref 0.5–4.6)
LYMPHOCYTES NFR BLD: 31 % (ref 13–44)
MCH RBC QN AUTO: 29.3 PG (ref 26.1–32.9)
MCHC RBC AUTO-ENTMCNC: 33.5 G/DL (ref 31.4–35)
MCV RBC AUTO: 87.5 FL (ref 82–102)
MONOCYTES # BLD: 1 K/UL (ref 0.1–1.3)
MONOCYTES NFR BLD: 11 % (ref 4–12)
NEUTS SEG # BLD: 5 K/UL (ref 1.7–8.2)
NEUTS SEG NFR BLD: 54 % (ref 43–78)
NITRITE UR QL STRIP.AUTO: NEGATIVE
NRBC # BLD: 0 K/UL (ref 0–0.2)
PH UR STRIP: 6 (ref 5–9)
PLATELET # BLD AUTO: 319 K/UL (ref 150–450)
PMV BLD AUTO: 9.9 FL (ref 9.4–12.3)
POTASSIUM SERPL-SCNC: 3.8 MMOL/L (ref 3.5–5.1)
PROT SERPL-MCNC: 6.8 G/DL (ref 6.3–8.2)
PROT UR STRIP-MCNC: NEGATIVE MG/DL
PSA SERPL-MCNC: 2 NG/ML (ref 0–4)
RBC # BLD AUTO: 4.47 M/UL (ref 4.23–5.6)
RBC #/AREA URNS HPF: ABNORMAL /HPF (ref 0–5)
SODIUM SERPL-SCNC: 135 MMOL/L (ref 136–145)
SP GR UR REFRACTOMETRY: 1.01 (ref 1–1.02)
TRIGL SERPL-MCNC: 112 MG/DL (ref 0–150)
TSH, 3RD GENERATION: 6.45 UIU/ML (ref 0.27–4.2)
UROBILINOGEN UR QL STRIP.AUTO: 1 EU/DL (ref 0.2–1)
VLDLC SERPL CALC-MCNC: 22 MG/DL (ref 6–23)
WBC # BLD AUTO: 9 K/UL (ref 4.3–11.1)
WBC URNS QL MICRO: ABNORMAL /HPF (ref 0–4)

## 2024-11-26 RX ORDER — POTASSIUM CHLORIDE 1500 MG/1
20 TABLET, EXTENDED RELEASE ORAL DAILY
Qty: 90 TABLET | Refills: 1 | Status: SHIPPED | OUTPATIENT
Start: 2024-11-26

## 2024-11-26 RX ORDER — EZETIMIBE 10 MG/1
10 TABLET ORAL DAILY
Qty: 90 TABLET | Refills: 1 | Status: SHIPPED | OUTPATIENT
Start: 2024-11-26

## 2024-11-26 RX ORDER — LISINOPRIL AND HYDROCHLOROTHIAZIDE 12.5; 2 MG/1; MG/1
1 TABLET ORAL DAILY
Qty: 90 TABLET | Refills: 1 | Status: SHIPPED | OUTPATIENT
Start: 2024-11-26

## 2024-11-26 RX ORDER — OMEPRAZOLE 40 MG/1
40 CAPSULE, DELAYED RELEASE ORAL DAILY
Qty: 90 CAPSULE | Refills: 1 | Status: SHIPPED | OUTPATIENT
Start: 2024-11-26

## 2024-11-26 RX ORDER — ROSUVASTATIN CALCIUM 20 MG/1
20 TABLET, COATED ORAL DAILY
Qty: 90 TABLET | Refills: 1 | Status: SHIPPED | OUTPATIENT
Start: 2024-11-26

## 2024-11-26 RX ORDER — LEVOTHYROXINE SODIUM 50 UG/1
50 TABLET ORAL DAILY
Qty: 90 TABLET | Refills: 1 | Status: SHIPPED | OUTPATIENT
Start: 2024-11-26

## 2024-11-26 RX ORDER — AMLODIPINE BESYLATE 5 MG/1
5 TABLET ORAL DAILY
Qty: 90 TABLET | Refills: 1 | Status: SHIPPED | OUTPATIENT
Start: 2024-11-26

## 2024-11-26 ASSESSMENT — PATIENT HEALTH QUESTIONNAIRE - PHQ9
SUM OF ALL RESPONSES TO PHQ QUESTIONS 1-9: 0
2. FEELING DOWN, DEPRESSED OR HOPELESS: NOT AT ALL

## 2024-11-26 NOTE — PROGRESS NOTES
mouth daily  Reyes Avalos MD   ezetimibe (ZETIA) 10 MG tablet Take 1 tablet by mouth daily  Reyes Avalos MD   rosuvastatin (CRESTOR) 20 MG tablet Take 1 tablet by mouth daily  Reyes Avalos MD   lisinopril-hydroCHLOROthiazide (PRINZIDE;ZESTORETIC) 20-12.5 MG per tablet Take 1 tablet by mouth daily  Reyes Avalos MD   omeprazole (PRILOSEC) 40 MG delayed release capsule Take 1 capsule by mouth daily  Reyes Avalos MD   potassium chloride (KLOR-CON M) 20 MEQ extended release tablet Take 1 tablet by mouth daily  Reyes Avalos MD   aspirin 81 MG EC tablet Take 1 tablet by mouth daily  Automatic Reconciliation, Ar       CareTeam (Including outside providers/suppliers regularly involved in providing care):   Patient Care Team:  Reyes Avalos MD as PCP - General  Reyes Avalos MD as PCP - Empaneled Provider      Reviewed and updated this visit:  Tobacco  Allergies  Meds  Problems  Med Hx  Surg Hx  Soc Hx  Fam Hx

## 2024-12-12 NOTE — PROGRESS NOTES
[facility]    PATIENT'S NAME: JAVAN GALE   ATTENDING PHYSICIAN: Arnulfo Wagner MD   CONSULTING PHYSICIAN: Marck Nelson MD   PATIENT ACCOUNT#:   [id number]    LOCATION:  [place] [address]  MEDICAL RECORD #:   [id number]       YOB: 1931  ADMISSION DATE:       12/11/2024      CONSULT DATE:  12/11/2024    REPORT OF CONSULTATION      HISTORY OF PRESENT ILLNESS:  This is a 89-year-old female brought to the [facility] Emergency Room this afternoon with complaints of fever.  She has been unresponsive since arrival, but apparently was unresponsive as well back at the nursing home.  She has had similar admissions in the past.  She is markedly hypercapnic and also hypoxic, and after arrival in the ER, she became hypotensive.  She has received a fluid bolus and has been placed on Levophed.  She has what is likely to be chronic atrial fibrillation with a rapid response.    PAST MEDICAL HISTORY:  Positive for colon cancer, atrial fibrillation, vertebral fractures, hyponatremia secondary to SIADH, coronary artery disease,  HFPEF, severe protein-calorie malnutrition, and oropharyngeal dysphagia.      MEDICATIONS:  Home medications are said to include midodrine, apixaban, furosemide, digoxin, metoprolol tartrate.      No other history available, and the patient is unresponsive to verbal and deep pain.    PHYSICAL EXAMINATION:    VITAL SIGNS:  Blood pressure currently 80/60; pulse 130, irregular; respirations 20.  NECK:  Veins are distended at 30 degrees of elevation.  Carotid upstroke is diminished.  No bruits are heard.  LUNGS:  Cannot assess posterior lung fields, but she is clear anterolaterally.    CHEST:  There are bilateral breast prostheses.  HEART:  Tones are diminished.  No pathologic murmur.  No S3.    ABDOMEN:  Soft.  No organomegaly.  EXTREMITIES:  Cool, poorly perfused.    LABORATORY DATA:  PH 7.32, pCO2 of 97, PO2 of 183, saturation 99%.  Sodium 144, potassium 4.5, bicarb  Yoel Hernandez  : 1945  [place] / [place] / Product Type: Medicare /  [address] at [facility] ELAINE CHAVEZ  [address] [place] [address], [address], [place] [address].  [phone]   [phone]       OUTPATIENT PHYSICAL THERAPY: Daily Treatment Note 2022  Visit Count: 5     ICD-10: Treatment Diagnosis: Difficulty in walking, not elsewhere classified (R26.2); Low back pain, unspecified (M54.5)  Precautions/Allergies:   Patient has no known allergies. TREATMENT PLAN:  Effective Dates: 3/23/2022 TO 2022  Frequency/Duration: 1-2 visits per week for 8 weeks MEDICAL/REFERRING DIAGNOSIS:  Encounter for follow-up examination after completed treatment for conditions other than malignant neoplasm [Z09]   DATE OF ONSET: Surgery: 2/10/2022  REFERRING PHYSICIAN: Candido Charles MD MD Orders: Evaluate and treat. Include modalities of soft tissue mobilization and dry needling. Return MD Appointment: TBD       Pre-treatment Symptoms/Complaints: Cut the grass this morning. Has been walking every day, and was able to go about 1 mile yesterday on a flat surface. Pain: Initial:   1/10 R LE Post Session:  No increase reported   Medications Last Reviewed:  3/28/22    Updated Objective Findings:   None today     TREATMENT:     Therapeutic Exercise: ( 40 minutes):  Exercises per grid below to improve mobility and strength. Required moderate visual and tactile cues to promote proper body mechanics. Progressed repetitions as indicated. Therapist assisted LE nerve glides x15.      Date:  3/25/22 Date:  3/28/22 Date:  3/30/22 Date  22   Activity/Exercise Parameters Parameters Parameters    Supine pelvic tilts 5 sec hold x 10 5 sec hold x 15 5 sec hold x15 with LE on stability ball X 20, 5 sec hold in hook lyingg   Low glute bridge 3x10 with glute squeeze 3x10 with ball between the knees 3x10 with LEs on stability ball 3 x 10 with LEs on stability ball   Supine B SLR 2x10 2x15 B 2x10 B -   Side lying hip abduction 2x10 B 2x10 B 2x10 B 3 x 10 B   B clamshells in side lying 3x10 2x10 B 3x10 B    Supine nerve flossing to sciatic nerve 2x10 See above 2x10 B LE R LE   Standing gastroc stretch 20 sec hold x 4 B at incline board 30 sec hold x 2 B  1 minute   Log rolling (bed mobility) Reviewed log rolling with pt and he demonstrated twice. To protect lumbar spine and decrease pain Verbally reviewed     Heel raises   2x10 standing 2 x 10 standing   Step ups   x15 B LE onto 6 in box, no UE support 6 inch  3 x 6-8 w/o support   Sit to stand 3x10 from mat table 3x10 from mat table 3x10 from mat table 3 x 8-10 from standard chair, hands on thighs   Standing exercises to promote good posture  Scapula retraction and shoulder ext 3x10 blue band in staggered stance   Scapular retractions   3 x 10 black, feet staggered                            HEP: Continue HEP. [facility]    Treatment/Session Summary:    · Response to Treatment: Has made good progress since beginning PT. R LE continues to have reduced endurance. Did not seem as sensitive to slump test position today as at his initial evaluation. · Communication/Consultation:  None today  · Equipment provided today:  None today  · Recommendations/Intent for next treatment session: Next visit will focus on improving B LE strength and reducing discomfort.     Total Treatment Billable Duration: 40 mins  PT Patient Time In/Time Out  Time In: 8144  Time Out: 6847 JOHNATHON Guevara PT    Future Appointments   Date Time Provider [facility]   4/5/2022  2:45 PM Rut Mcnally., PT [facility]   4/7/2022 11:15 AM Rut Mcnally., PT    4/7/2022 12:30 PM [facility] PHONE APPOINTMENT [facility] PRE A   [facility]11[facility]2022 11:[facility]   4/13/2022  9:45 AM Sharon Park MD [facility] POA   4/13/2022 11:15 AM [facility]   4/22/2022 10:40 AM Delilah Johnson greater than 40, BUN 50, creatinine 0.4.  ProBNP 7642.  TSH normal.  WBC 12.1, hemoglobin 11.9, platelets 229.  There is pyuria.     EKG shows atrial fibrillation with rapid response.  No acute changes and no ischemic changes.      ASSESSMENT:    1.   Critically ill, unresponsive patient, probably septic and with severe hypercarbia contributing to her obtundation.  2.   Hypotension and fever, probably septic shock, source uncertain.    3.   Historically normal LV systolic function.  History of heart failure with preserved ejection fraction.  4.   Permanent atrial fibrillation.  On Eliquis.  Rapid response due in part to severe concurrent illness.  Will be permissive with the rate.  We will give a dose of digoxin, and hopefully with fluids, heart rate will reduce.    PLAN:    1.   Pressors as needed.    2.   Echo in the morning.    3.   Dose of digoxin.    4.   Patient probably needs large volumes of fluid for sepsis, but the addition of CHF on top of her baseline respiratory insufficiency, it would be very poorly tolerated.     Need discussion with family regarding advanced directives and aggressiveness of care.    Thank you for this consultation.    Dictated By Marck Nelson MD  d: 12/11/2024 19:26:05  t: 12/11/2024 19:56:46  Job [id number]  [facility]/    cc: Arnulfo Wagner MD     MD [facility]   9/20/2022 11:00 AM Geannie Dakins, MD [facility]

## 2025-01-08 ENCOUNTER — LAB (OUTPATIENT)
Dept: INTERNAL MEDICINE CLINIC | Facility: CLINIC | Age: 80
End: 2025-01-08

## 2025-01-08 DIAGNOSIS — E03.4 HYPOTHYROIDISM DUE TO ACQUIRED ATROPHY OF THYROID: Primary | ICD-10-CM

## 2025-01-08 LAB — TSH, 3RD GENERATION: 8.65 UIU/ML (ref 0.27–4.2)

## 2025-01-09 ENCOUNTER — PATIENT MESSAGE (OUTPATIENT)
Dept: INTERNAL MEDICINE CLINIC | Facility: CLINIC | Age: 80
End: 2025-01-09

## 2025-01-09 RX ORDER — LEVOTHYROXINE SODIUM 75 UG/1
75 TABLET ORAL DAILY
Qty: 90 TABLET | Refills: 1 | Status: SHIPPED | OUTPATIENT
Start: 2025-01-09

## 2025-01-24 NOTE — PROGRESS NOTES
mouth initially but this side effect resolved.   Muscle relaxant       Topical       Opioid Butrans     Percocet Moderate benefit     Moderate benefit              Workmans Comp: No  Lawsuit: No    Review of Systems   Constitutional:  Negative for chills and fever.   Respiratory:  Negative for shortness of breath.    Cardiovascular:  Negative for chest pain.   Gastrointestinal:  Negative for abdominal pain.   Musculoskeletal:  Positive for back pain.   Psychiatric/Behavioral:  Negative for confusion.        Objective:        PHYSICAL EXAM:  There were no vitals filed for this visit.  Wt Readings from Last 3 Encounters:   11/26/24 93 kg (205 lb)   03/20/24 90.3 kg (199 lb)   09/20/23 92.5 kg (204 lb)       Constitutional:  Normal appearance. No acute distress.   HENT: Normocephalic and atraumatic.   Eyes: Extraocular movements intact.   Cardiovascular: No significant edema noted  Pulmonary:  Pulmonary effort is normal.   Musculoskeletal: Moderate tenderness palpation of her right greater than left lumbar facets and paraspinal muscles, facet loading moderately positive right greater than left in the lumbar spine, straight leg raise moderately positive on the right for pain radiating in a low lumbar distribution, ISAMAR negative, no GTB tenderness.  Neurological: No focal deficit present. He is alert.   Psychiatric: Mood and affect normal. Behavior normal.     No orders of the defined types were placed in this encounter.    Requested Prescriptions     Signed Prescriptions Disp Refills    oxyCODONE-acetaminophen (PERCOCET) 5-325 MG per tablet 60 tablet 0     Sig: Take 1 tablet by mouth in the morning and at bedtime for 30 days. Intended supply: 3 days. Take lowest dose possible to manage pain Max Daily Amount: 2 tablets    oxyCODONE-acetaminophen (PERCOCET) 5-325 MG per tablet 60 tablet 0     Sig: Take 1 tablet by mouth in the morning and at bedtime for 30 days. Intended supply: 3 days. Take lowest dose possible to

## 2025-01-28 ENCOUNTER — OFFICE VISIT (OUTPATIENT)
Age: 80
End: 2025-01-28
Payer: MEDICARE

## 2025-01-28 DIAGNOSIS — M96.1 LUMBAR POST-LAMINECTOMY SYNDROME: ICD-10-CM

## 2025-01-28 DIAGNOSIS — M54.41 CHRONIC BILATERAL LOW BACK PAIN WITH RIGHT-SIDED SCIATICA: Primary | ICD-10-CM

## 2025-01-28 DIAGNOSIS — M47.816 FACET ARTHROPATHY, LUMBAR: ICD-10-CM

## 2025-01-28 DIAGNOSIS — M70.61 GREATER TROCHANTERIC BURSITIS OF RIGHT HIP: ICD-10-CM

## 2025-01-28 DIAGNOSIS — G89.29 CHRONIC BILATERAL LOW BACK PAIN WITH RIGHT-SIDED SCIATICA: Primary | ICD-10-CM

## 2025-01-28 PROCEDURE — 1159F MED LIST DOCD IN RCRD: CPT | Performed by: ANESTHESIOLOGY

## 2025-01-28 PROCEDURE — G2211 COMPLEX E/M VISIT ADD ON: HCPCS | Performed by: ANESTHESIOLOGY

## 2025-01-28 PROCEDURE — 1160F RVW MEDS BY RX/DR IN RCRD: CPT | Performed by: ANESTHESIOLOGY

## 2025-01-28 PROCEDURE — 1123F ACP DISCUSS/DSCN MKR DOCD: CPT | Performed by: ANESTHESIOLOGY

## 2025-01-28 PROCEDURE — 99214 OFFICE O/P EST MOD 30 MIN: CPT | Performed by: ANESTHESIOLOGY

## 2025-01-28 RX ORDER — OXYCODONE AND ACETAMINOPHEN 5; 325 MG/1; MG/1
1 TABLET ORAL 2 TIMES DAILY
Qty: 60 TABLET | Refills: 0 | Status: SHIPPED | OUTPATIENT
Start: 2025-02-02 | End: 2025-03-04

## 2025-01-28 RX ORDER — PREGABALIN 300 MG/1
300 CAPSULE ORAL 2 TIMES DAILY
Qty: 60 CAPSULE | Refills: 2 | Status: SHIPPED | OUTPATIENT
Start: 2025-01-28 | End: 2025-04-28

## 2025-01-28 RX ORDER — OXYCODONE AND ACETAMINOPHEN 5; 325 MG/1; MG/1
1 TABLET ORAL 2 TIMES DAILY
Qty: 60 TABLET | Refills: 0 | Status: SHIPPED | OUTPATIENT
Start: 2025-04-03 | End: 2025-05-03

## 2025-01-28 RX ORDER — OXYCODONE AND ACETAMINOPHEN 5; 325 MG/1; MG/1
1 TABLET ORAL 2 TIMES DAILY
Qty: 60 TABLET | Refills: 0 | Status: SHIPPED | OUTPATIENT
Start: 2025-03-04 | End: 2025-04-03

## 2025-01-28 RX ORDER — CELECOXIB 200 MG/1
200 CAPSULE ORAL 2 TIMES DAILY
Qty: 60 CAPSULE | Refills: 2 | Status: SHIPPED | OUTPATIENT
Start: 2025-01-28

## 2025-01-28 ASSESSMENT — ENCOUNTER SYMPTOMS
BACK PAIN: 1
SHORTNESS OF BREATH: 0
ABDOMINAL PAIN: 0

## 2025-02-21 ENCOUNTER — OFFICE VISIT (OUTPATIENT)
Dept: UROLOGY | Age: 80
End: 2025-02-21
Payer: MEDICARE

## 2025-02-21 DIAGNOSIS — R31.29 MICROHEMATURIA: ICD-10-CM

## 2025-02-21 DIAGNOSIS — N40.1 BPH WITH OBSTRUCTION/LOWER URINARY TRACT SYMPTOMS: Primary | ICD-10-CM

## 2025-02-21 DIAGNOSIS — N13.8 BPH WITH OBSTRUCTION/LOWER URINARY TRACT SYMPTOMS: Primary | ICD-10-CM

## 2025-02-21 LAB
BILIRUBIN, URINE, POC: NEGATIVE
BLOOD URINE, POC: NORMAL
GLUCOSE URINE, POC: NEGATIVE MG/DL
KETONES, URINE, POC: NEGATIVE MG/DL
LEUKOCYTE ESTERASE, URINE, POC: NEGATIVE
NITRITE, URINE, POC: NEGATIVE
PH, URINE, POC: 5.5 (ref 4.6–8)
PROTEIN,URINE, POC: NEGATIVE MG/DL
SPECIFIC GRAVITY, URINE, POC: 1.02 (ref 1–1.03)
URINALYSIS CLARITY, POC: NORMAL
URINALYSIS COLOR, POC: NORMAL
UROBILINOGEN, POC: NORMAL MG/DL

## 2025-02-21 PROCEDURE — G8427 DOCREV CUR MEDS BY ELIG CLIN: HCPCS | Performed by: NURSE PRACTITIONER

## 2025-02-21 PROCEDURE — 1123F ACP DISCUSS/DSCN MKR DOCD: CPT | Performed by: NURSE PRACTITIONER

## 2025-02-21 PROCEDURE — G8417 CALC BMI ABV UP PARAM F/U: HCPCS | Performed by: NURSE PRACTITIONER

## 2025-02-21 PROCEDURE — 1036F TOBACCO NON-USER: CPT | Performed by: NURSE PRACTITIONER

## 2025-02-21 PROCEDURE — 1160F RVW MEDS BY RX/DR IN RCRD: CPT | Performed by: NURSE PRACTITIONER

## 2025-02-21 PROCEDURE — 99213 OFFICE O/P EST LOW 20 MIN: CPT | Performed by: NURSE PRACTITIONER

## 2025-02-21 PROCEDURE — 1159F MED LIST DOCD IN RCRD: CPT | Performed by: NURSE PRACTITIONER

## 2025-02-21 PROCEDURE — 81003 URINALYSIS AUTO W/O SCOPE: CPT | Performed by: NURSE PRACTITIONER

## 2025-02-21 ASSESSMENT — ENCOUNTER SYMPTOMS: BACK PAIN: 0

## 2025-02-21 NOTE — PROGRESS NOTES
[facility]  [address] [address]  [phone]          Brijesh Mcmanus  : 1945    Chief Complaint   Patient presents with    Follow-up    Benign Prostatic Hypertrophy          HPI     Brijesh Mcmanus is a 79 y.o. male  with a PMH of BPH/Urinary retention s/p urolift 22 who presents for fu.      Seen 22 and he reported voiding well on his own after surgery.  Off of flomax and finasteride since surgery and doing well.  Only issue is he reported NO urge to urinate and often will go 6-8 hours without voiding.  No hematuria.  No UTis.  No urinary incontinence.      Presented to ER on 22 w c/o urinary retention and constipation. No BM x 7 days. MOM enema adm. Catheter inserted and pt discharged home. VT passed on 23.  cc.   Lab Results   Component Value Date    PSA 2.0 2024    PSA 2.7 2023    PSA 2.5 2022    PSA 1.5 2021    PSA 1.6 2020    PSA 1.3 2019     Started Norco ATC per pain management for back pain. Occ constipation w this. PVR 5 cc via u/s. Notes int short lived dysuria if he does not drink enough water. Heavy caffeine in am.             Past Medical History:   Diagnosis Date    Axonal polyneuropathy 2018    B12 deficiency 2019    BPH (benign prostatic hyperplasia) 2012    CAD (coronary artery disease) 2020    Ca score 468    Diverticulitis     GERD (gastroesophageal reflux disease)     medication    HDL deficiency 2012    Hemorrhoid     int and ext    Hypertension 2012    medication    Hypokalemia     Hypothyroid     medication    Lumbar radicular pain 2019    Murmur, cardiac     echo 20  EF 55-60%    Prediabetes     diet control and weight loss  21 A1c 6.4    Systolic murmur 2020     Past Surgical History:   Procedure Laterality Date    COLONOSCOPY      LUMBAR LAMINECTOMY      ORTHOPEDIC SURGERY      right leg surgery to

## 2025-04-28 ENCOUNTER — OFFICE VISIT (OUTPATIENT)
Age: 80
End: 2025-04-28
Payer: MEDICARE

## 2025-04-28 DIAGNOSIS — M47.816 FACET ARTHROPATHY, LUMBAR: ICD-10-CM

## 2025-04-28 DIAGNOSIS — M54.41 CHRONIC BILATERAL LOW BACK PAIN WITH RIGHT-SIDED SCIATICA: ICD-10-CM

## 2025-04-28 DIAGNOSIS — M70.61 GREATER TROCHANTERIC BURSITIS OF RIGHT HIP: ICD-10-CM

## 2025-04-28 DIAGNOSIS — G89.29 CHRONIC BILATERAL LOW BACK PAIN WITH RIGHT-SIDED SCIATICA: ICD-10-CM

## 2025-04-28 DIAGNOSIS — M96.1 LUMBAR POST-LAMINECTOMY SYNDROME: ICD-10-CM

## 2025-04-28 PROCEDURE — 99214 OFFICE O/P EST MOD 30 MIN: CPT | Performed by: ANESTHESIOLOGY

## 2025-04-28 PROCEDURE — G2211 COMPLEX E/M VISIT ADD ON: HCPCS | Performed by: ANESTHESIOLOGY

## 2025-04-28 PROCEDURE — 1159F MED LIST DOCD IN RCRD: CPT | Performed by: ANESTHESIOLOGY

## 2025-04-28 PROCEDURE — 1123F ACP DISCUSS/DSCN MKR DOCD: CPT | Performed by: ANESTHESIOLOGY

## 2025-04-28 PROCEDURE — 1160F RVW MEDS BY RX/DR IN RCRD: CPT | Performed by: ANESTHESIOLOGY

## 2025-04-28 RX ORDER — PREGABALIN 300 MG/1
300 CAPSULE ORAL 2 TIMES DAILY
Qty: 60 CAPSULE | Refills: 2 | Status: SHIPPED | OUTPATIENT
Start: 2025-05-13 | End: 2025-08-11

## 2025-04-28 RX ORDER — OXYCODONE AND ACETAMINOPHEN 5; 325 MG/1; MG/1
1 TABLET ORAL 2 TIMES DAILY
Qty: 60 TABLET | Refills: 0 | Status: SHIPPED | OUTPATIENT
Start: 2025-06-02 | End: 2025-04-28

## 2025-04-28 RX ORDER — OXYCODONE AND ACETAMINOPHEN 5; 325 MG/1; MG/1
1 TABLET ORAL 2 TIMES DAILY
Qty: 60 TABLET | Refills: 0 | Status: SHIPPED | OUTPATIENT
Start: 2025-08-01 | End: 2025-04-28

## 2025-04-28 RX ORDER — OXYCODONE AND ACETAMINOPHEN 5; 325 MG/1; MG/1
1 TABLET ORAL 2 TIMES DAILY PRN
Qty: 60 TABLET | Refills: 0 | Status: SHIPPED | OUTPATIENT
Start: 2025-07-02 | End: 2025-08-01

## 2025-04-28 RX ORDER — OXYCODONE AND ACETAMINOPHEN 5; 325 MG/1; MG/1
1 TABLET ORAL 2 TIMES DAILY PRN
Qty: 60 TABLET | Refills: 0 | Status: SHIPPED | OUTPATIENT
Start: 2025-06-02 | End: 2025-07-02

## 2025-04-28 RX ORDER — OXYCODONE AND ACETAMINOPHEN 5; 325 MG/1; MG/1
1 TABLET ORAL 2 TIMES DAILY
Qty: 60 TABLET | Refills: 0 | Status: SHIPPED | OUTPATIENT
Start: 2025-05-03 | End: 2025-04-28

## 2025-04-28 RX ORDER — OXYCODONE AND ACETAMINOPHEN 5; 325 MG/1; MG/1
1 TABLET ORAL 2 TIMES DAILY PRN
Qty: 60 TABLET | Refills: 0 | Status: SHIPPED | OUTPATIENT
Start: 2025-05-03 | End: 2025-06-02

## 2025-04-28 ASSESSMENT — ENCOUNTER SYMPTOMS
ABDOMINAL PAIN: 0
BACK PAIN: 1
SHORTNESS OF BREATH: 0

## 2025-04-28 NOTE — PROGRESS NOTES
Chronic Pain Follow-up Note    Date: April 28, 2025  Patient Name: Brijesh Mcmanus  MRN: [id number]  PCP: Reyes Avalos  Referring Provider: No ref. provider found    Assessment:   Diagnosis:  1. Lumbar post-laminectomy syndrome    2. Chronic bilateral low back pain with right-sided sciatica    3. Facet arthropathy, lumbar    4. Greater trochanteric bursitis of right hip      Plan:      General Recommendations: The pain condition that the patient suffers from is best treated with a multidisciplinary approach that involves an increase in physical activity to prevent de-conditioning and worsening of the pain cycle, as well as psychological counseling (formal and/or informal) to address the co-morbid psychological effects of pain.  Treatment will often involve judicious use of pain medications and interventional procedures to decrease the pain, allowing the patient to participate in the physical activity that will ultimately produce long-lasting pain reductions.  The goal of the multidisciplinary approach is to return the patient to a higher level of overall function and to restore their ability to perform activities of daily living.    Testing:  None.     Therapy:  Has completed multiple rounds of physical therapy and continues to do his home exercise program regularly.  Has tried heat/ice without benefit.  Utilizes rest/activity modification with some improvement.     Medications:  Continue Celebrex 200 mg twice per day as needed.  Patient denies contraindications to NSAIDs.  Lab work in November showed normal kidney function.  Has tried ibuprofen and naproxen.  Refilled Lyrica 300 mg twice per day.  Has tried gabapentin without benefit.  Has tried nortriptyline and Cymbalta without benefit.  Denies issues with muscle spasms.  Refilled Percocet 5/325 mg twice daily as needed, 60 pills, for 3 months with fill dates of 5/3/2025, 6/2/2025, and 7/2/2025.  Could consider increasing slightly.  Tried Belbuca without

## 2025-05-27 ENCOUNTER — OFFICE VISIT (OUTPATIENT)
Dept: INTERNAL MEDICINE CLINIC | Facility: CLINIC | Age: 80
End: 2025-05-27
Payer: MEDICARE

## 2025-05-27 VITALS
OXYGEN SATURATION: 98 % | TEMPERATURE: 97.4 F | RESPIRATION RATE: 16 BRPM | HEIGHT: 74 IN | DIASTOLIC BLOOD PRESSURE: 60 MMHG | BODY MASS INDEX: 26.31 KG/M2 | SYSTOLIC BLOOD PRESSURE: 130 MMHG | HEART RATE: 58 BPM | WEIGHT: 205 LBS

## 2025-05-27 DIAGNOSIS — E87.6 HYPOKALEMIA: ICD-10-CM

## 2025-05-27 DIAGNOSIS — I25.10 CORONARY ARTERY DISEASE DUE TO CALCIFIED CORONARY LESION: Primary | ICD-10-CM

## 2025-05-27 DIAGNOSIS — E03.4 HYPOTHYROIDISM DUE TO ACQUIRED ATROPHY OF THYROID: ICD-10-CM

## 2025-05-27 DIAGNOSIS — E78.2 MIXED HYPERLIPIDEMIA: ICD-10-CM

## 2025-05-27 DIAGNOSIS — I25.84 CORONARY ARTERY DISEASE DUE TO CALCIFIED CORONARY LESION: ICD-10-CM

## 2025-05-27 DIAGNOSIS — R73.03 PREDIABETES: ICD-10-CM

## 2025-05-27 DIAGNOSIS — I25.84 CORONARY ARTERY DISEASE DUE TO CALCIFIED CORONARY LESION: Primary | ICD-10-CM

## 2025-05-27 DIAGNOSIS — I10 ESSENTIAL HYPERTENSION: ICD-10-CM

## 2025-05-27 DIAGNOSIS — K21.9 GASTROESOPHAGEAL REFLUX DISEASE WITHOUT ESOPHAGITIS: ICD-10-CM

## 2025-05-27 DIAGNOSIS — I25.10 CORONARY ARTERY DISEASE DUE TO CALCIFIED CORONARY LESION: ICD-10-CM

## 2025-05-27 LAB
ALBUMIN SERPL-MCNC: 3.9 G/DL (ref 3.2–4.6)
ALBUMIN/GLOB SERPL: 1.2 (ref 1–1.9)
ALP SERPL-CCNC: 52 U/L (ref 40–129)
ALT SERPL-CCNC: 21 U/L (ref 8–55)
ANION GAP SERPL CALC-SCNC: 10 MMOL/L (ref 7–16)
AST SERPL-CCNC: 27 U/L (ref 15–37)
BASOPHILS # BLD: 0.04 K/UL (ref 0–0.2)
BASOPHILS NFR BLD: 0.5 % (ref 0–2)
BILIRUB DIRECT SERPL-MCNC: 0.2 MG/DL (ref 0–0.3)
BILIRUB SERPL-MCNC: 0.5 MG/DL (ref 0–1.2)
BUN SERPL-MCNC: 17 MG/DL (ref 8–23)
CALCIUM SERPL-MCNC: 9.6 MG/DL (ref 8.8–10.2)
CHLORIDE SERPL-SCNC: 100 MMOL/L (ref 98–107)
CO2 SERPL-SCNC: 26 MMOL/L (ref 20–29)
CREAT SERPL-MCNC: 0.85 MG/DL (ref 0.8–1.3)
DIFFERENTIAL METHOD BLD: NORMAL
EOSINOPHIL # BLD: 0.1 K/UL (ref 0–0.8)
EOSINOPHIL NFR BLD: 1.4 % (ref 0.5–7.8)
ERYTHROCYTE [DISTWIDTH] IN BLOOD BY AUTOMATED COUNT: 13.3 % (ref 11.9–14.6)
EST. AVERAGE GLUCOSE BLD GHB EST-MCNC: 134 MG/DL
GLOBULIN SER CALC-MCNC: 3.2 G/DL (ref 2.3–3.5)
GLUCOSE SERPL-MCNC: 107 MG/DL (ref 70–99)
HBA1C MFR BLD: 6.3 % (ref 0–5.6)
HCT VFR BLD AUTO: 43.3 % (ref 41.1–50.3)
HGB BLD-MCNC: 14.3 G/DL (ref 13.6–17.2)
IMM GRANULOCYTES # BLD AUTO: 0.02 K/UL (ref 0–0.5)
IMM GRANULOCYTES NFR BLD AUTO: 0.3 % (ref 0–5)
LYMPHOCYTES # BLD: 2.5 K/UL (ref 0.5–4.6)
LYMPHOCYTES NFR BLD: 33.9 % (ref 13–44)
MCH RBC QN AUTO: 28.9 PG (ref 26.1–32.9)
MCHC RBC AUTO-ENTMCNC: 33 G/DL (ref 31.4–35)
MCV RBC AUTO: 87.7 FL (ref 82–102)
MONOCYTES # BLD: 0.85 K/UL (ref 0.1–1.3)
MONOCYTES NFR BLD: 11.5 % (ref 4–12)
NEUTS SEG # BLD: 3.87 K/UL (ref 1.7–8.2)
NEUTS SEG NFR BLD: 52.4 % (ref 43–78)
NRBC # BLD: 0 K/UL (ref 0–0.2)
PLATELET # BLD AUTO: 324 K/UL (ref 150–450)
PMV BLD AUTO: 9.7 FL (ref 9.4–12.3)
POTASSIUM SERPL-SCNC: 3.6 MMOL/L (ref 3.5–5.1)
PROT SERPL-MCNC: 7.1 G/DL (ref 6.3–8.2)
RBC # BLD AUTO: 4.94 M/UL (ref 4.23–5.6)
SODIUM SERPL-SCNC: 137 MMOL/L (ref 136–145)
TSH, 3RD GENERATION: 0.98 UIU/ML (ref 0.27–4.2)
WBC # BLD AUTO: 7.4 K/UL (ref 4.3–11.1)

## 2025-05-27 PROCEDURE — 1160F RVW MEDS BY RX/DR IN RCRD: CPT | Performed by: INTERNAL MEDICINE

## 2025-05-27 PROCEDURE — G8427 DOCREV CUR MEDS BY ELIG CLIN: HCPCS | Performed by: INTERNAL MEDICINE

## 2025-05-27 PROCEDURE — 1036F TOBACCO NON-USER: CPT | Performed by: INTERNAL MEDICINE

## 2025-05-27 PROCEDURE — 1159F MED LIST DOCD IN RCRD: CPT | Performed by: INTERNAL MEDICINE

## 2025-05-27 PROCEDURE — G2211 COMPLEX E/M VISIT ADD ON: HCPCS | Performed by: INTERNAL MEDICINE

## 2025-05-27 PROCEDURE — 99214 OFFICE O/P EST MOD 30 MIN: CPT | Performed by: INTERNAL MEDICINE

## 2025-05-27 PROCEDURE — 1123F ACP DISCUSS/DSCN MKR DOCD: CPT | Performed by: INTERNAL MEDICINE

## 2025-05-27 PROCEDURE — G8417 CALC BMI ABV UP PARAM F/U: HCPCS | Performed by: INTERNAL MEDICINE

## 2025-05-27 PROCEDURE — 3075F SYST BP GE 130 - 139MM HG: CPT | Performed by: INTERNAL MEDICINE

## 2025-05-27 PROCEDURE — 3078F DIAST BP <80 MM HG: CPT | Performed by: INTERNAL MEDICINE

## 2025-05-27 RX ORDER — OMEPRAZOLE 40 MG/1
40 CAPSULE, DELAYED RELEASE ORAL DAILY
Qty: 100 CAPSULE | Refills: 1 | Status: SHIPPED | OUTPATIENT
Start: 2025-05-27

## 2025-05-27 RX ORDER — ROSUVASTATIN CALCIUM 20 MG/1
20 TABLET, COATED ORAL DAILY
Qty: 100 TABLET | Refills: 1 | Status: SHIPPED | OUTPATIENT
Start: 2025-05-27

## 2025-05-27 RX ORDER — LEVOTHYROXINE SODIUM 75 UG/1
75 TABLET ORAL DAILY
Qty: 100 TABLET | Refills: 1 | Status: SHIPPED | OUTPATIENT
Start: 2025-05-27

## 2025-05-27 RX ORDER — POTASSIUM CHLORIDE 1500 MG/1
20 TABLET, EXTENDED RELEASE ORAL DAILY
Qty: 100 TABLET | Refills: 1 | Status: SHIPPED | OUTPATIENT
Start: 2025-05-27

## 2025-05-27 RX ORDER — LISINOPRIL AND HYDROCHLOROTHIAZIDE 12.5; 2 MG/1; MG/1
1 TABLET ORAL DAILY
Qty: 100 TABLET | Refills: 1 | Status: SHIPPED | OUTPATIENT
Start: 2025-05-27

## 2025-05-27 RX ORDER — EZETIMIBE 10 MG/1
10 TABLET ORAL DAILY
Qty: 100 TABLET | Refills: 1 | Status: SHIPPED | OUTPATIENT
Start: 2025-05-27

## 2025-05-27 RX ORDER — AMLODIPINE BESYLATE 5 MG/1
5 TABLET ORAL DAILY
Qty: 100 TABLET | Refills: 1 | Status: SHIPPED | OUTPATIENT
Start: 2025-05-27

## 2025-05-27 ASSESSMENT — ENCOUNTER SYMPTOMS: BLOOD IN STOOL: 0

## 2025-05-27 NOTE — PROGRESS NOTES
Reyes Avalos M.D.  [place] [address]  Phone: [phone] Fax: [phone]    Hypertension  This is a chronic problem. Pertinent negatives include no chest pain.     Brijesh Mcmanus is a 80 y.o. White (non-) male.     Current Outpatient Medications   Medication Sig Dispense Refill    pregabalin (LYRICA) 300 MG capsule Take 1 capsule by mouth 2 times daily for 90 days. Max Daily Amount: 600 mg 60 capsule 2    [START ON 7/2/2025] oxyCODONE-acetaminophen (PERCOCET) 5-325 MG per tablet Take 1 tablet by mouth 2 times daily as needed for Pain for up to 30 days. Intended supply: 3 days. Take lowest dose possible to manage pain Max Daily Amount: 2 tablets 60 tablet 0    oxyCODONE-acetaminophen (PERCOCET) 5-325 MG per tablet Take 1 tablet by mouth 2 times daily as needed for Pain for up to 30 days. Intended supply: 3 days. Take lowest dose possible to manage pain Max Daily Amount: 2 tablets 60 tablet 0    [START ON 6/2/2025] oxyCODONE-acetaminophen (PERCOCET) 5-325 MG per tablet Take 1 tablet by mouth 2 times daily as needed for Pain for up to 30 days. Intended supply: 3 days. Take lowest dose possible to manage pain Max Daily Amount: 2 tablets 60 tablet 0    celecoxib (CELEBREX) 200 MG capsule Take 1 capsule by mouth 2 times daily 60 capsule 2    levothyroxine (SYNTHROID) 75 MCG tablet Take 1 tablet by mouth daily 90 tablet 1    rosuvastatin (CRESTOR) 20 MG tablet Take 1 tablet by mouth daily 90 tablet 1    potassium chloride (KLOR-CON M) 20 MEQ extended release tablet Take 1 tablet by mouth daily 90 tablet 1    omeprazole (PRILOSEC) 40 MG delayed release capsule Take 1 capsule by mouth daily 90 capsule 1    lisinopril-hydroCHLOROthiazide (PRINZIDE;ZESTORETIC) 20-12.5 MG per tablet Take 1 tablet by mouth daily 90 tablet 1    ezetimibe (ZETIA) 10 MG tablet Take 1 tablet by mouth daily 90 tablet 1    amLODIPine (NORVASC) 5 MG tablet Take 1

## 2025-06-26 NOTE — PATIENT INSTRUCTIONS
"  Admission Medication History     The home medication history was taken by Ivonne Chávez.    You may go to "Admission" then "Reconcile Home Medications" tabs to review and/or act upon these items.     The home medication list has been updated by the Pharmacy department.   Please read ALL comments highlighted in yellow.   Please address this information as you see fit.    Feel free to contact us if you have any questions or require assistance.      The medications listed below were removed from the home medication list. Please reorder if appropriate:  Patient reports no longer taking the following medication(s):  Sutab 1.479-0.188-0.225 gram        Medications listed below were obtained from: Patient/family and Analytic software- [employer]  Current Outpatient Medications on File Prior to Encounter   Medication Sig    allopurinoL (ZYLOPRIM) 100 MG tablet Take 100 mg by mouth daily as needed (gout flare-up).      amLODIPine (NORVASC) 5 MG tablet Take 5 mg by mouth daily as needed.    ascorbic acid (VITAMIN C ORAL) Take 1 tablet by mouth Daily.    aspirin (ECOTRIN) 81 MG EC tablet Take 1 tablet (81 mg total) by mouth once daily.      baclofen (LIORESAL) 10 MG tablet Take 10 mg by mouth 2 (two) times daily.      cyanocobalamin, vitamin B-12, (VITAMIN B-12 ORAL) Take 1 tablet by mouth every morning.    ergocalciferol, vitamin D2, (VITAMIN D ORAL) Take 1 tablet by mouth Daily.    ezetimibe (ZETIA) 10 mg tablet Take 10 mg by mouth every evening.      famotidine (PEPCID) 40 MG tablet Take 40 mg by mouth daily as needed for Heartburn.      ferrous sulfate (IRON ORAL) Take 1 tablet by mouth Daily.      metroNIDAZOLE (FLAGYL) 500 MG tablet Take 500 mg by mouth every 6 (six) hours.    ondansetron (ZOFRAN) 4 MG tablet Take 1 tablet (4 mg total) by mouth every 6 (six) hours as needed for Nausea.      oxyCODONE-acetaminophen (PERCOCET)  mg per tablet Take 1 tablet by mouth every 8 (eight) hours as needed for Pain.      " I recommend all standard healthcare maintenance and cancer screenings (Colon cancer screening, Mammogram and Bone Density if female and appropriate, etc) and standard immunizations (Flu, Pneumonia, Covid-19, Tetanus boosters, etc) as appropriate and due to lower your risk for potentially preventable morbidity/mortality from these diseases. rosuvastatin (CRESTOR) 20 MG tablet Take 1 tablet (20 mg total) by mouth once daily at bedtime.      tiZANidine (ZANAFLEX) 4 MG tablet Take 4 mg by mouth nightly as needed.      nitroGLYCERIN (NITROSTAT) 0.4 MG SL tablet Place 1 tablet (0.4 mg total) under the tongue every 5 (five) minutes as needed for Chest pain.               Ivonne Chávez  EXT [id number]                .

## 2025-07-29 ENCOUNTER — OFFICE VISIT (OUTPATIENT)
Age: 80
End: 2025-07-29
Payer: MEDICARE

## 2025-07-29 DIAGNOSIS — Z51.81 ENCOUNTER FOR THERAPEUTIC DRUG MONITORING: ICD-10-CM

## 2025-07-29 DIAGNOSIS — M70.61 GREATER TROCHANTERIC BURSITIS OF RIGHT HIP: ICD-10-CM

## 2025-07-29 DIAGNOSIS — G89.29 CHRONIC BILATERAL LOW BACK PAIN WITH RIGHT-SIDED SCIATICA: ICD-10-CM

## 2025-07-29 DIAGNOSIS — M96.1 LUMBAR POST-LAMINECTOMY SYNDROME: Primary | ICD-10-CM

## 2025-07-29 DIAGNOSIS — M54.41 CHRONIC BILATERAL LOW BACK PAIN WITH RIGHT-SIDED SCIATICA: ICD-10-CM

## 2025-07-29 DIAGNOSIS — M96.1 LUMBAR POST-LAMINECTOMY SYNDROME: ICD-10-CM

## 2025-07-29 DIAGNOSIS — M47.816 FACET ARTHROPATHY, LUMBAR: ICD-10-CM

## 2025-07-29 PROCEDURE — 1159F MED LIST DOCD IN RCRD: CPT | Performed by: ANESTHESIOLOGY

## 2025-07-29 PROCEDURE — 99214 OFFICE O/P EST MOD 30 MIN: CPT | Performed by: ANESTHESIOLOGY

## 2025-07-29 PROCEDURE — 1160F RVW MEDS BY RX/DR IN RCRD: CPT | Performed by: ANESTHESIOLOGY

## 2025-07-29 PROCEDURE — 1123F ACP DISCUSS/DSCN MKR DOCD: CPT | Performed by: ANESTHESIOLOGY

## 2025-07-29 PROCEDURE — G2211 COMPLEX E/M VISIT ADD ON: HCPCS | Performed by: ANESTHESIOLOGY

## 2025-07-29 RX ORDER — OXYCODONE AND ACETAMINOPHEN 5; 325 MG/1; MG/1
1 TABLET ORAL 3 TIMES DAILY PRN
Qty: 60 TABLET | Refills: 0 | Status: SHIPPED | OUTPATIENT
Start: 2025-08-01 | End: 2025-08-31

## 2025-07-29 RX ORDER — OXYCODONE AND ACETAMINOPHEN 5; 325 MG/1; MG/1
1 TABLET ORAL 3 TIMES DAILY PRN
Qty: 60 TABLET | Refills: 0 | Status: SHIPPED | OUTPATIENT
Start: 2025-08-31 | End: 2025-09-30

## 2025-07-29 RX ORDER — OXYCODONE AND ACETAMINOPHEN 5; 325 MG/1; MG/1
1 TABLET ORAL 3 TIMES DAILY PRN
Qty: 60 TABLET | Refills: 0 | Status: SHIPPED | OUTPATIENT
Start: 2025-09-30 | End: 2025-10-30

## 2025-07-29 RX ORDER — PREGABALIN 200 MG/1
200 CAPSULE ORAL 3 TIMES DAILY
Qty: 90 CAPSULE | Refills: 2 | Status: SHIPPED | OUTPATIENT
Start: 2025-07-29 | End: 2025-10-27

## 2025-07-29 ASSESSMENT — ENCOUNTER SYMPTOMS
SHORTNESS OF BREATH: 0
BACK PAIN: 1
ABDOMINAL PAIN: 0

## 2025-07-29 NOTE — PROGRESS NOTES
Chronic Pain Follow-up Note    Date: July 29, 2025  Patient Name: Brijesh Mcmanus  MRN: [id number]  PCP: Reyes Avalos  Referring Provider: No ref. provider found    Assessment:   Diagnosis:  1. Lumbar post-laminectomy syndrome    2. Encounter for therapeutic drug monitoring    3. Chronic bilateral low back pain with right-sided sciatica    4. Facet arthropathy, lumbar    5. Greater trochanteric bursitis of right hip      Plan:      General Recommendations: The pain condition that the patient suffers from is best treated with a multidisciplinary approach that involves an increase in physical activity to prevent de-conditioning and worsening of the pain cycle, as well as psychological counseling (formal and/or informal) to address the co-morbid psychological effects of pain.  Treatment will often involve judicious use of pain medications and interventional procedures to decrease the pain, allowing the patient to participate in the physical activity that will ultimately produce long-lasting pain reductions.  The goal of the multidisciplinary approach is to return the patient to a higher level of overall function and to restore their ability to perform activities of daily living.    Testing:  Updating MRI lumbar spine due to worsening pain.     Therapy:  Has completed multiple rounds of physical therapy and continues to do his home exercise program regularly.  Has tried heat/ice without benefit.  Utilizes rest/activity modification with some improvement.     Medications:  Continue Celebrex 200 mg twice per day as needed.  Patient denies contraindications to NSAIDs.  Lab work in November showed normal kidney function.  Has tried ibuprofen and naproxen.  Adjusting Lyrica to 200 mg 3 times per day to hopefully decrease sedation.  Has tried gabapentin without benefit.  Has tried nortriptyline and Cymbalta without benefit.  Denies issues with muscle spasms.  Increasing Percocet 5/325 mg to 3 times per day as needed, 90

## 2025-08-02 LAB — DRUGS UR: NORMAL

## 2025-08-04 ENCOUNTER — TELEPHONE (OUTPATIENT)
Age: 80
End: 2025-08-04

## 2025-08-04 DIAGNOSIS — G89.29 CHRONIC BILATERAL LOW BACK PAIN WITH RIGHT-SIDED SCIATICA: ICD-10-CM

## 2025-08-04 DIAGNOSIS — M96.1 LUMBAR POST-LAMINECTOMY SYNDROME: ICD-10-CM

## 2025-08-04 DIAGNOSIS — M54.41 CHRONIC BILATERAL LOW BACK PAIN WITH RIGHT-SIDED SCIATICA: ICD-10-CM

## 2025-08-04 DIAGNOSIS — M70.61 GREATER TROCHANTERIC BURSITIS OF RIGHT HIP: ICD-10-CM

## 2025-08-04 DIAGNOSIS — M47.816 FACET ARTHROPATHY, LUMBAR: ICD-10-CM

## 2025-08-04 RX ORDER — OXYCODONE AND ACETAMINOPHEN 5; 325 MG/1; MG/1
1 TABLET ORAL 3 TIMES DAILY PRN
Qty: 90 TABLET | Refills: 0 | Status: SHIPPED | OUTPATIENT
Start: 2025-09-30 | End: 2025-10-30

## 2025-08-04 RX ORDER — OXYCODONE AND ACETAMINOPHEN 5; 325 MG/1; MG/1
1 TABLET ORAL DAILY PRN
Qty: 27 TABLET | Refills: 0 | Status: SHIPPED | OUTPATIENT
Start: 2025-08-04 | End: 2025-08-05

## 2025-08-04 RX ORDER — OXYCODONE AND ACETAMINOPHEN 5; 325 MG/1; MG/1
1 TABLET ORAL 3 TIMES DAILY PRN
Qty: 90 TABLET | Refills: 0 | Status: SHIPPED | OUTPATIENT
Start: 2025-08-31 | End: 2025-09-30

## 2025-08-05 RX ORDER — OXYCODONE AND ACETAMINOPHEN 5; 325 MG/1; MG/1
1 TABLET ORAL 3 TIMES DAILY
Qty: 30 TABLET | Refills: 0 | Status: SHIPPED | OUTPATIENT
Start: 2025-08-05 | End: 2025-08-15

## 2025-08-06 ENCOUNTER — HOSPITAL ENCOUNTER (OUTPATIENT)
Dept: MRI IMAGING | Age: 80
Discharge: HOME | End: 2025-08-09
Attending: ANESTHESIOLOGY
Payer: MEDICARE

## 2025-08-06 DIAGNOSIS — M54.41 CHRONIC BILATERAL LOW BACK PAIN WITH RIGHT-SIDED SCIATICA: ICD-10-CM

## 2025-08-06 DIAGNOSIS — M96.1 LUMBAR POST-LAMINECTOMY SYNDROME: ICD-10-CM

## 2025-08-06 DIAGNOSIS — G89.29 CHRONIC BILATERAL LOW BACK PAIN WITH RIGHT-SIDED SCIATICA: ICD-10-CM

## 2025-08-06 PROCEDURE — 72148 MRI LUMBAR SPINE W/O DYE: CPT

## 2025-08-12 ENCOUNTER — RESULTS FOLLOW-UP (OUTPATIENT)
Age: 80
End: 2025-08-12

## 2025-08-12 DIAGNOSIS — G89.29 CHRONIC BILATERAL LOW BACK PAIN WITH RIGHT-SIDED SCIATICA: Primary | ICD-10-CM

## 2025-08-12 DIAGNOSIS — M54.41 CHRONIC BILATERAL LOW BACK PAIN WITH RIGHT-SIDED SCIATICA: Primary | ICD-10-CM

## 2025-08-21 ENCOUNTER — OFFICE VISIT (OUTPATIENT)
Age: 80
End: 2025-08-21
Payer: MEDICARE

## 2025-08-21 DIAGNOSIS — M47.816 FACET ARTHROPATHY, LUMBAR: Primary | ICD-10-CM

## 2025-08-21 DIAGNOSIS — M54.16 LUMBAR RADICULOPATHY: ICD-10-CM

## 2025-08-21 PROCEDURE — 64483 NJX AA&/STRD TFRM EPI L/S 1: CPT | Performed by: ANESTHESIOLOGY

## 2025-08-21 RX ORDER — DEXAMETHASONE SODIUM PHOSPHATE 10 MG/ML
10 INJECTION, SOLUTION INTRA-ARTICULAR; INTRALESIONAL; INTRAMUSCULAR; INTRAVENOUS; SOFT TISSUE ONCE
Status: COMPLETED | OUTPATIENT
Start: 2025-08-21 | End: 2025-08-21

## 2025-08-21 RX ADMIN — DEXAMETHASONE SODIUM PHOSPHATE 10 MG: 10 INJECTION, SOLUTION INTRA-ARTICULAR; INTRALESIONAL; INTRAMUSCULAR; INTRAVENOUS; SOFT TISSUE at 10:13

## (undated) DEVICE — 3M™ IOBAN™ 2 ANTIMICROBIAL INCISE DRAPE 6650EZ: Brand: IOBAN™ 2

## (undated) DEVICE — SPONGE LAP 18X18IN STRL -- 5/PK

## (undated) DEVICE — SUTURE VCRL SZ 2-0 L27IN ABSRB UD L36MM CP-1 1/2 CIR REV J266H

## (undated) DEVICE — SOLUTION IRRIG 3000ML H2O STRL BAG

## (undated) DEVICE — SPONGE GZ W4XL4IN COT 12 PLY TYP VII WVN C FLD DSGN

## (undated) DEVICE — SUTURE VCRL + SZ 3-0 L18IN ABSRB UD PS-2 3/8 CIR REV CUT VCP497H

## (undated) DEVICE — Z DUP USE 2701075 SYSTEM SKIN CLSR 42CM DERMBND PRINEO

## (undated) DEVICE — INTENDED FOR TISSUE SEPARATION, AND OTHER PROCEDURES THAT REQUIRE A SHARP SURGICAL BLADE TO PUNCTURE OR CUT.: Brand: BARD-PARKER SAFETY BLADES SIZE 10, STERILE

## (undated) DEVICE — GARMENT,MEDLINE,DVT,INT,CALF,MED, GEN2: Brand: MEDLINE

## (undated) DEVICE — 3M™ STERI-DRAPE™ INSTRUMENT POUCH 1018: Brand: STERI-DRAPE™

## (undated) DEVICE — 3M™ TEGADERM™ TRANSPARENT FILM DRESSING FRAME STYLE, 1628, 6 IN X 8 IN (15 CM X 20 CM), 10/CT 8CT/CASE: Brand: 3M™ TEGADERM™

## (undated) DEVICE — 5.0MM PRECISION ROUND

## (undated) DEVICE — REM POLYHESIVE ADULT PATIENT RETURN ELECTRODE: Brand: VALLEYLAB

## (undated) DEVICE — NEEDLE HYPO 21GA L1.5IN INTRAMUSCULAR S STL LATCH BVL UP

## (undated) DEVICE — DRAPE SHT 3 QTR PROXIMA 53X77 --

## (undated) DEVICE — SOLUTION IRRIG 1000ML 09% SOD CHL USP PIC PLAS CONTAINER

## (undated) DEVICE — 3M™ TEGADERM™ TRANSPARENT FILM DRESSING FRAME STYLE, 1626W, 4 IN X 4-3/4 IN (10 CM X 12 CM), 50/CT 4CT/CASE: Brand: 3M™ TEGADERM™

## (undated) DEVICE — JACKSON TABLE POSITIONER KIT: Brand: MEDLINE INDUSTRIES, INC.

## (undated) DEVICE — 1010 S-DRAPE TOWEL DRAPE 10/BX: Brand: STERI-DRAPE™

## (undated) DEVICE — INTENDED FOR TISSUE SEPARATION, AND OTHER PROCEDURES THAT REQUIRE A SHARP SURGICAL BLADE TO PUNCTURE OR CUT.: Brand: BARD-PARKER SAFETY BLADES SIZE 15, STERILE

## (undated) DEVICE — BIPOLAR SEALER 23-113-1 AQM 2.3 OM NEURO: Brand: AQUAMANTYS ®

## (undated) DEVICE — AGENT HEMSTAT 8ML FLX TIP MTRX + DISP SURGIFLO

## (undated) DEVICE — SYR 10ML LUER LOK 1/5ML GRAD --

## (undated) DEVICE — POSTERIOR LAMI VANPLT-LUCAS: Brand: MEDLINE INDUSTRIES, INC.

## (undated) DEVICE — BONE WAX WHITE: Brand: BONE WAX WHITE

## (undated) DEVICE — C-ARM: Brand: UNBRANDED

## (undated) DEVICE — BIPOLAR FORCEPS CORD: Brand: VALLEYLAB

## (undated) DEVICE — CARDINAL HEALTH FLEXIBLE LIGHT HANDLE COVER: Brand: CARDINAL HEALTH

## (undated) DEVICE — 2000CC GUARDIAN II: Brand: GUARDIAN

## (undated) DEVICE — SUTURE VCRL SZ 1 L27IN ABSRB UD L36MM CP-1 1/2 CIR REV CUT J268H

## (undated) DEVICE — PREP SKN CHLRAPRP APL 26ML STR --

## (undated) DEVICE — PACK SURGICAL PROCEDURE KIT CYSTOSCOPY TOTE